# Patient Record
Sex: FEMALE | Race: ASIAN | Employment: OTHER | URBAN - METROPOLITAN AREA
[De-identification: names, ages, dates, MRNs, and addresses within clinical notes are randomized per-mention and may not be internally consistent; named-entity substitution may affect disease eponyms.]

---

## 2017-02-09 ENCOUNTER — OFFICE VISIT (OUTPATIENT)
Dept: INTERNAL MEDICINE CLINIC | Age: 72
End: 2017-02-09

## 2017-02-09 VITALS
DIASTOLIC BLOOD PRESSURE: 69 MMHG | WEIGHT: 132.4 LBS | SYSTOLIC BLOOD PRESSURE: 141 MMHG | BODY MASS INDEX: 26 KG/M2 | HEART RATE: 80 BPM | HEIGHT: 60 IN | TEMPERATURE: 97.3 F | RESPIRATION RATE: 16 BRPM

## 2017-02-09 DIAGNOSIS — M85.80 OSTEOPENIA DETERMINED BY X-RAY: ICD-10-CM

## 2017-02-09 DIAGNOSIS — H26.9 CATARACT: ICD-10-CM

## 2017-02-09 DIAGNOSIS — R26.89 BALANCE PROBLEM: Primary | ICD-10-CM

## 2017-02-09 DIAGNOSIS — R41.3 MEMORY CHANGE: ICD-10-CM

## 2017-02-09 RX ORDER — LANOLIN ALCOHOL/MO/W.PET/CERES
400 CREAM (GRAM) TOPICAL DAILY
COMMUNITY
End: 2018-02-22

## 2017-02-09 NOTE — PATIENT INSTRUCTIONS
Learning About Living Susan  What is a living will? A living will is a legal form you use to write down the kind of care you want at the end of your life. It is used by the health professionals who will treat you if you aren't able to decide for yourself. If you put your wishes in writing, your loved ones and others will know what kind of care you want. They won't need to guess. This can ease your mind and be helpful to others. A living will is not the same as an estate or property will. An estate will explains what you want to happen with your money and property after you die. Is a living will a legal document? A living will is a legal document. Each state has its own laws about living ramon. If you move to another state, make sure that your living will is legal in the state where you now live. Or you might use a universal form that has been approved by many states. This kind of form can sometimes be completed and stored online. Your electronic copy will then be available wherever you have a connection to the Internet. In most cases, doctors will respect your wishes even if you have a form from a different state. · You don't need an  to complete a living will. But legal advice can be helpful if your state's laws are unclear, your health history is complicated, or your family can't agree on what should be in your living will. · You can change your living will at any time. Some people find that their wishes about end-of-life care change as their health changes. · In addition to making a living will, think about completing a medical power of  form. This form lets you name the person you want to make end-of-life treatment decisions for you (your \"health care agent\") if you're not able to. Many hospitals and nursing homes will give you the forms you need to complete a living will and a medical power of .   · Your living will is used only if you can't make or communicate decisions for yourself anymore. If you become able to make decisions again, you can accept or refuse any treatment, no matter what you wrote in your living will. · Your state may offer an online registry. This is a place where you can store your living will online so the doctors and nurses who need to treat you can find it right away. What should you think about when creating a living will? Talk about your end-of-life wishes with your family members and your doctor. Let them know what you want. That way the people making decisions for you won't be surprised by your choices. Think about these questions as you make your living will:  · Do you know enough about life support methods that might be used? If not, talk to your doctor so you know what might be done if you can't breathe on your own, your heart stops, or you're unable to swallow. · What things would you still want to be able to do after you receive life-support methods? Would you want to be able to walk? To speak? To eat on your own? To live without the help of machines? · If you have a choice, where do you want to be cared for? In your home? At a hospital or nursing home? · Do you want certain Hinduism practices performed if you become very ill? · If you have a choice at the end of your life, where would you prefer to die? At home? In a hospital or nursing home? Somewhere else? · Would you prefer to be buried or cremated? · Do you want your organs to be donated after you die? What should you do with your living will? · Make sure that your family members and your health care agent have copies of your living will. · Give your doctor a copy of your living will to keep in your medical record. If you have more than one doctor, make sure that each one has a copy. · You may want to put a copy of your living will where it can be easily found. Where can you learn more? Go to http://jose juan-juancarlos.info/.   Enter B177 in the search box to learn more about \"Learning About Living Perroy. \"  Current as of: February 24, 2016  Content Version: 11.1  © 3626-7823 [employer]. Care instructions adapted under license by [employer] (which disclaims liability or warranty for this information). If you have questions about a medical condition or this instruction, always ask your healthcare professional. [address] any warranty or liability for your use of this information.

## 2017-02-09 NOTE — MR AVS SNAPSHOT
Visit Information Date & Time Provider Department Dept. Phone Encounter #  
 2/9/2017  2:00 PM Simone Natarajan MD [address] [facility] [place] and Internal Medicine [phone] [id number] Follow-up Instructions Return in about 2 months (around 4/9/2017) for medicare wellness. Upcoming Health Maintenance Date Due DTaP/Tdap/Td series (1 - Tdap) 5/25/1965 FOBT Q 1 YEAR AGE 50-75 5/25/1994 ZOSTER VACCINE AGE 60> 5/25/2004 GLAUCOMA SCREENING Q2Y 5/25/2009 Pneumococcal 65+ Low/Medium Risk (1 of 2 - PCV13) 5/25/2009 MEDICARE YEARLY EXAM 5/25/2009 INFLUENZA AGE 9 TO ADULT 8/1/2016 BREAST CANCER SCRN MAMMOGRAM 3/31/2018 Allergies as of 2/9/2017  Review Complete On: 2/9/2017 By: Simone Natarajan MD  
 No Known Allergies Current Immunizations  Never Reviewed No immunizations on file. Not reviewed this visit You Were Diagnosed With   
  
 Codes Comments Balance problem    -  Primary ICD-10-CM: R26.89 
ICD-9-CM: 781.99 Memory change     ICD-10-CM: R41.3 ICD-9-CM: 780.93 Osteopenia determined by x-ray     ICD-10-CM: M85.80 ICD-9-CM: 733.90 Cataract     ICD-10-CM: H26.9 ICD-9-CM: 366.9 Vitals BP Pulse Temp Resp Height(growth percentile) Weight(growth percentile) 141/69 80 97.3 °F (36.3 °C) (Oral) 16 5' (1.524 m) 132 lb 6.4 oz (60.1 kg) BMI OB Status Smoking Status 25.86 kg/m2 Postmenopausal Never Smoker BMI and BSA Data Body Mass Index Body Surface Area  
 25.86 kg/m 2 1.6 m 2 Preferred Pharmacy Pharmacy Name Phone [id number] [place], [address] [place] [address] [phone] Your Updated Medication List  
  
   
This list is accurate as of: 2/9/17  3:23 PM.  Always use your most recent med list.  
  
  
  
  
 calcium 500 mg Tab Take  by mouth. fish oil-omega-3 fatty acids 340-1,000 mg capsule Take 1 Cap by mouth daily. vitamin E acetate 400 unit Cap capsule Take  by mouth daily. We Performed the Following REFERRAL TO OPHTHALMOLOGY [REF57 Custom] Comments:  
 Reason for request: Diabetic eye Exam  
 REFERRAL TO PHYSICAL THERAPY [MZR36 Custom] Comments:  
 Evaluate and treat, debility, balance problems, falls most recently 1 month ago. Leans forward. Would patient benefit from walker? Follow-up Instructions Return in about 2 months (around 4/9/2017) for medicare wellness. Referral Information Referral ID Referred By Referred To  
  
 [id number] Michi Roblesnet Not Available Visits Status Start Date End Date 1 New Request 2/9/17 2/9/18 If your referral has a status of pending review or denied, additional information will be sent to support the outcome of this decision. Referral ID Referred By Referred To  
 [id number] Donnie Espana MD  
   [facility], [facility] Phone: [phone] Fax: [phone] Visits Status Start Date End Date 1 New Request 2/9/17 2/9/18 If your referral has a status of pending review or denied, additional information will be sent to support the outcome of this decision. Patient Instructions Seth Stevie Hauser 1721 What is a living will? A living will is a legal form you use to write down the kind of care you want at the end of your life. It is used by the health professionals who will treat you if you aren't able to decide for yourself. If you put your wishes in writing, your loved ones and others will know what kind of care you want. They won't need to guess. This can ease your mind and be helpful to others. A living will is not the same as an estate or property will. An estate will explains what you want to happen with your money and property after you die. Is a living will a legal document? A living will is a legal document.  Each state has its own laws about living ramon. If you move to another state, make sure that your living will is legal in the state where you now live. Or you might use a universal form that has been approved by many states. This kind of form can sometimes be completed and stored online. Your electronic copy will then be available wherever you have a connection to the Internet. In most cases, doctors will respect your wishes even if you have a form from a different state. · You don't need an  to complete a living will. But legal advice can be helpful if your state's laws are unclear, your health history is complicated, or your family can't agree on what should be in your living will. · You can change your living will at any time. Some people find that their wishes about end-of-life care change as their health changes. · In addition to making a living will, think about completing a medical power of  form. This form lets you name the person you want to make end-of-life treatment decisions for you (your \"health care agent\") if you're not able to. Many hospitals and nursing homes will give you the forms you need to complete a living will and a medical power of . · Your living will is used only if you can't make or communicate decisions for yourself anymore. If you become able to make decisions again, you can accept or refuse any treatment, no matter what you wrote in your living will. · Your state may offer an online registry. This is a place where you can store your living will online so the doctors and nurses who need to treat you can find it right away. What should you think about when creating a living will? Talk about your end-of-life wishes with your family members and your doctor. Let them know what you want. That way the people making decisions for you won't be surprised by your choices. Think about these questions as you make your living will: · Do you know enough about life support methods that might be used? If not, talk to your doctor so you know what might be done if you can't breathe on your own, your heart stops, or you're unable to swallow. · What things would you still want to be able to do after you receive life-support methods? Would you want to be able to walk? To speak? To eat on your own? To live without the help of machines? · If you have a choice, where do you want to be cared for? In your home? At a hospital or nursing home? · Do you want certain Scientologist practices performed if you become very ill? · If you have a choice at the end of your life, where would you prefer to die? At home? In a hospital or nursing home? Somewhere else? · Would you prefer to be buried or cremated? · Do you want your organs to be donated after you die? What should you do with your living will? · Make sure that your family members and your health care agent have copies of your living will. · Give your doctor a copy of your living will to keep in your medical record. If you have more than one doctor, make sure that each one has a copy. · You may want to put a copy of your living will where it can be easily found. Where can you learn more? Go to http://jose juan-juancarlos.info/. Enter M929 in the search box to learn more about \"Learning About Living Kalyn Tariq. \" Current as of: February 24, 2016 Content Version: 11.1 © 3841-9071 [employer]. Care instructions adapted under license by [employer] (which disclaims liability or warranty for this information). If you have questions about a medical condition or this instruction, always ask your healthcare professional. [address] any warranty or liability for your use of this information. Please provide this summary of care documentation to your next provider. Your primary care clinician is listed as Анна Ronquillo.  If you have any questions after today's visit, please call [phone].

## 2017-02-09 NOTE — PROGRESS NOTES
RM 3  Pt presents today with her daughter  Daughter is translating  Chief Complaint   Patient presents with   [address]       1. Have you been to the ER, urgent care clinic since your last visit? Hospitalized since your last visit? No    2. Have you seen or consulted any other health care providers outside of the [address] [place] [address] since your last visit? Include any pap smears or colon screening.  No    Health Maintenance Due   Topic Date Due    DTaP/Tdap/Td series (1 - Tdap) 05/25/1965    FOBT Q 1 YEAR AGE 50-75  05/25/1994    ZOSTER VACCINE AGE 60>  05/25/2004    GLAUCOMA SCREENING Q2Y  05/25/2009    Pneumococcal 65+ Low/Medium Risk (1 of 2 - PCV13) 05/25/2009    MEDICARE YEARLY EXAM  05/25/2009    INFLUENZA AGE 9 TO ADULT  08/01/2016     Living will sent to pt AVs

## 2017-04-04 ENCOUNTER — TELEPHONE (OUTPATIENT)
Dept: INTERNAL MEDICINE CLINIC | Age: 72
End: 2017-04-04

## 2017-04-04 ENCOUNTER — OFFICE VISIT (OUTPATIENT)
Dept: INTERNAL MEDICINE CLINIC | Age: 72
End: 2017-04-04

## 2017-04-04 VITALS
TEMPERATURE: 98.2 F | SYSTOLIC BLOOD PRESSURE: 167 MMHG | HEIGHT: 60 IN | DIASTOLIC BLOOD PRESSURE: 88 MMHG | OXYGEN SATURATION: 99 % | BODY MASS INDEX: 23.68 KG/M2 | HEART RATE: 93 BPM | WEIGHT: 120.6 LBS | RESPIRATION RATE: 16 BRPM

## 2017-04-04 DIAGNOSIS — R03.0 ELEVATED BLOOD PRESSURE READING: ICD-10-CM

## 2017-04-04 DIAGNOSIS — R41.0 DELIRIUM: ICD-10-CM

## 2017-04-04 DIAGNOSIS — R25.9 MIXED ACTION AND RESTING TREMOR: ICD-10-CM

## 2017-04-04 DIAGNOSIS — R41.82 ACUTE ON CHRONIC ALTERATION IN MENTAL STATUS: Primary | ICD-10-CM

## 2017-04-04 DIAGNOSIS — Z87.440 HISTORY OF BLADDER INFECTIONS: ICD-10-CM

## 2017-04-04 DIAGNOSIS — R26.89 SHUFFLING GAIT: ICD-10-CM

## 2017-04-04 DIAGNOSIS — N30.01 ACUTE CYSTITIS WITH HEMATURIA: Primary | ICD-10-CM

## 2017-04-04 DIAGNOSIS — R41.3 MEMORY CHANGE: ICD-10-CM

## 2017-04-04 LAB
BILIRUB UR QL STRIP: NEGATIVE
GLUCOSE UR-MCNC: NEGATIVE MG/DL
KETONES P FAST UR STRIP-MCNC: NEGATIVE MG/DL
PH UR STRIP: 6 [PH] (ref 4.6–8)
PROT UR QL STRIP: NEGATIVE MG/DL
SP GR UR STRIP: 1.02 (ref 1–1.03)
UA UROBILINOGEN AMB POC: NORMAL (ref 0.2–1)
URINALYSIS CLARITY POC: CLEAR
URINALYSIS COLOR POC: YELLOW
URINE BLOOD POC: NORMAL
URINE LEUKOCYTES POC: NORMAL
URINE NITRITES POC: NEGATIVE

## 2017-04-04 RX ORDER — SULFAMETHOXAZOLE AND TRIMETHOPRIM 800; 160 MG/1; MG/1
1 TABLET ORAL 2 TIMES DAILY
Qty: 10 TAB | Refills: 0 | Status: SHIPPED | OUTPATIENT
Start: 2017-04-04 | End: 2017-04-09

## 2017-04-04 RX ORDER — HALOPERIDOL 0.5 MG/1
0.5 TABLET ORAL
Qty: 15 TAB | Refills: 0 | Status: SHIPPED | OUTPATIENT
Start: 2017-04-04 | End: 2017-04-10

## 2017-04-04 NOTE — PROGRESS NOTES
Patient is both [place] speaking. Declined at last visit to have  and today. Daughter serves as . She is also accompanied by her . HPI   Monse Fish He is a 66 y.o. female, she presents today for:    Daughter notes that she is acting very differently. Is playing on the phone all night long. Not getting sleep. Has been buying high cost items, iphone, tvs, gift certificates. States that someone is telling her to do this. That she is being watched. Packed up her suitcase and went outside. Was brought back to home with police. Will wait for clock to turn to 8 or 3 and continue to wait until the \"right time\"     This behavior is all new over past 2-3 weeks and per family is getting worse. Has not complained of headache, but when asked today states she does have. No known fall. No vomiting, normal appetite, no fever. History of UTIs, generally will lose continence. Is not experiencing this currently (neither daughter nor  noting a change). Goes to the bathroom for a very long time but no known diarrhea. More forgetful than prior. No falls, normal balance, no change in gait. No alcohol. No supplemental medications. Only is taking clacium    PMH/PSH: reviewed and updated  Sochx:  reports that she has never smoked. She does not have any smokeless tobacco history on file. She reports that she does not drink alcohol or use illicit drugs. Famhx: reviewed and updated     All: No Known Allergies  Med:   Current Outpatient Prescriptions   Medication Sig    calcium 500 mg Tab Take  by mouth.  fish oil-omega-3 fatty acids 340-1,000 mg capsule Take 1 Cap by mouth daily.  VITAMIN E, DL,TOCOPHERYL ACET, (VITAMIN E ACETATE) 400 unit cap capsule Take  by mouth daily. No current facility-administered medications for this visit. Review of Systems   Constitutional: Negative for chills, fever and weight loss. HENT: Negative for congestion. Respiratory: Negative for cough, shortness of breath and wheezing. Cardiovascular: Negative for chest pain and leg swelling. Gastrointestinal: Negative for abdominal pain, diarrhea, nausea and vomiting. Genitourinary: Negative for dysuria, frequency and urgency. Skin: Negative for rash. Neurological: Positive for headaches. Negative for dizziness. Psychiatric/Behavioral: Positive for hallucinations. The patient has insomnia. (ROS as supplied by patient with assistance of  and daughter)  PE: Blood pressure 167/88, pulse 93, temperature 98.2 °F (36.8 °C), temperature source Oral, resp. rate 16, height 5' (1.524 m), weight 120 lb 9.6 oz (54.7 kg), SpO2 99 %. Body mass index is 23.55 kg/(m^2). Physical Exam   Constitutional: No distress. HENT:   Head: Normocephalic. Mouth/Throat: Oropharynx is clear and moist.   Eyes: Conjunctivae and EOM are normal.   Neck: Neck supple. Cardiovascular: Normal rate, regular rhythm and normal heart sounds. Pulmonary/Chest: Effort normal and breath sounds normal.   Neurological: She is alert. No cranial nerve deficit. Pleasant and cooperative. Does not answer all questions, looks confused. Unable to fully ascertain orientation with questions as she doesn't answer. + resting and action tremor. Gait somewhat shuffling, turns en bloc. This is stable from prior per daughter. Skin: Skin is warm and dry. Nursing note and vitals reviewed. Labs: see addendum  EKG: NSR, QTc 414, normsl ST segments. A/P:  66 y.o. female    ICD-10-CM ICD-9-CM    1. Delirium R41.0 780.09 AMB POC URINALYSIS DIP STICK AUTO W/O MICRO      AMB POC EKG ROUTINE W/ 12 LEADS, INTER & REP      CBC WITH AUTOMATED DIFF      METABOLIC PANEL, COMPREHENSIVE      TSH RFX ON ABNORMAL TO FREE T4      VITAMIN B12      VITAMIN D, 25 HYDROXY      CULTURE, URINE      haloperidol (HALDOL) 0.5 mg tablet   2.  Shuffling gait R26.89 781.2 CT HEAD WO CONT      REFERRAL TO NEUROLOGY REFERRAL TO NEUROLOGY   3. Mixed action and resting tremor R25.9 781.0    4. Memory change R41.3 780.93 CT HEAD WO CONT      TSH RFX ON ABNORMAL TO FREE T4      VITAMIN B12      VITAMIN D, 25 HYDROXY      REFERRAL TO NEUROLOGY      REFERRAL TO NEUROLOGY   5. History of bladder infections Z87.440 V13.02      Delirium vs psychosis with history of memory change. Limited information from prior work-up. Did have MRI brain around 2 years ago with prior primary care. - change in behavior and sleep for 2-3 weeks. No clear cause on exam, nor history. No medications/supplements except baseline calcium. Urine dip wnl and not having typical symptoms of UTI. EKG wnl, bella vitals and no other sign of cause for delirium. Neuro motor exam at baseline. .    - urine culture sent   - will get cbc and metabolic stat   - head CT stat   - no sign of acute infection on vitals. And patient is not in any discomfort. - if labs and head Ct wnl, plan to give haldol prn. As these should result tomorrow, discussed waiting 1 more day before starting medications. - advised to go to ER if new fever, falling, not waking normaly, or other notable change. - follow-up with neurology, will order MRI once initial work-up completed. Elevated blood pressure: repeat at follow-up next week after work-up above complete. Follow-up Disposition:  Return in about 1 week (around 4/11/2017) for 30 minute, follow-up change in behavior,. Future Appointments  Date Time Provider Kyler Dsouza   4/5/2017 3:30 PM [facility] PSYCHIATRIC [place] CT ER 1 HRCT . [facility]   4/10/2017 3:00 PM MD Aneudy Watson 18 and scheduled Head CT stat. Please send note to:  Fax: [phone] April.

## 2017-04-04 NOTE — TELEPHONE ENCOUNTER
Called and discussed urinalysis with daughter. With 1+ blood and 1+ leuks, will treat as for uti. rx for bactrim x5 days sent to [facility]. (24 hour)   Will get first dose tonight.     Zenaida Gomez MD

## 2017-04-04 NOTE — PROGRESS NOTES
RM 2    Chief Complaint   Patient presents with    Dementia     PHQ 2 / 9, over the last two weeks 4/4/2017   Little interest or pleasure in doing things Not at all   Feeling down, depressed or hopeless Nearly every day   Total Score PHQ 2 3     Fall Risk Assessment, last 12 mths 4/4/2017   Able to walk? Yes   Fall in past 12 months? Yes   Fall with injury?  No   Number of falls in past 12 months 4   Fall Risk Score 4

## 2017-04-04 NOTE — MR AVS SNAPSHOT
Visit Information Date & Time Provider Department Dept. Phone Encounter #  
 4/4/2017  4:15 PM Teresita Vega MD [facility]  Follow-up Instructions Return in about 1 week (around 4/11/2017) for 30 minute, follow-up change in behavior,. Your Appointments 4/10/2017  3:00 PM  
Medicare Physical with Teresita Vega MD  
[facility]) Appt Note: medicare wellness [address] [place] [address]  
[address] [place] [address] Upcoming Health Maintenance Date Due DTaP/Tdap/Td series (1 - Tdap) 5/25/1965 FOBT Q 1 YEAR AGE 50-75 5/25/1994 ZOSTER VACCINE AGE 60> 5/25/2004 GLAUCOMA SCREENING Q2Y 5/25/2009 Pneumococcal 65+ Low/Medium Risk (1 of 2 - PCV13) 5/25/2009 MEDICARE YEARLY EXAM 5/25/2009 INFLUENZA AGE 9 TO ADULT 8/1/2016 BREAST CANCER SCRN MAMMOGRAM 3/31/2018 Allergies as of 4/4/2017  Review Complete On: 4/4/2017 By: Jacqueline Booker No Known Allergies Current Immunizations  Never Reviewed No immunizations on file. Not reviewed this visit You Were Diagnosed With   
  
 Codes Comments Delirium    -  Primary ICD-10-CM: R41.0 ICD-9-CM: 780.09 Shuffling gait     ICD-10-CM: R26.89 
ICD-9-CM: 781.2 Intention tremor     ICD-10-CM: G25.2 ICD-9-CM: 333.1 Memory change     ICD-10-CM: R41.3 ICD-9-CM: 780.93 Vitals BP Pulse Temp Resp Height(growth percentile) Weight(growth percentile) 167/88 93 98.2 °F (36.8 °C) (Oral) 16 5' (1.524 m) 120 lb 9.6 oz (54.7 kg) SpO2 BMI OB Status Smoking Status 99% 23.55 kg/m2 Postmenopausal Never Smoker Vitals History BMI and BSA Data Body Mass Index Body Surface Area  
 23.55 kg/m 2 1.52 m 2 Preferred Pharmacy Pharmacy Name Phone [id number], [address] [phone] Your Updated Medication List  
  
   
This list is accurate as of: 4/4/17  5:34 PM.  Always use your most recent med list.  
  
  
  
  
 calcium 500 mg Tab Take  by mouth. fish oil-omega-3 fatty acids 340-1,000 mg capsule Take 1 Cap by mouth daily. haloperidol 0.5 mg tablet Commonly known as:  HALDOL Take 1 Tab by mouth nightly. As needed for insomnia/aggitation  
  
 vitamin E acetate 400 unit Cap capsule Take  by mouth daily. Prescriptions Printed Refills  
 haloperidol (HALDOL) 0.5 mg tablet 0 Sig: Take 1 Tab by mouth nightly. As needed for insomnia/aggitation Class: Print Route: Oral  
  
We Performed the Following AMB POC EKG ROUTINE W/ 12 LEADS, INTER & REP [25126 CPT(R)] AMB POC URINALYSIS DIP STICK AUTO W/O MICRO [83762 CPT(R)] CBC WITH AUTOMATED DIFF [54399 CPT(R)] CULTURE, URINE [id number] CPT(R)] METABOLIC PANEL, COMPREHENSIVE [88243 CPT(R)] REFERRAL TO NEUROLOGY [CYI61 Custom] Comments:  
 Please evaluate patient for dementia with shuffling gait,  parkinsons TSH RFX ON ABNORMAL TO FREE T4 [[id number] Custom] VITAMIN B12 [id number] CPT(R)] VITAMIN D, 25 HYDROXY [id number] CPT(R)] Follow-up Instructions Return in about 1 week (around 4/11/2017) for 30 minute, follow-up change in behavior,. To-Do List   
 04/05/2017 Imaging:  CT HEAD WO CONT   
  
 04/05/2017 3:30 PM  
  Appointment with [address] [place] [address] CT ER 1 at [address] [place] [address] RAD [address] ([phone]) NON-CONTRAST STUDY: 1. Bring any non [facility] facility films/images pertaining to the area of interest with you on the day of appointment. 2. Check in at registration at least 30 minutes before appt time unless you were instructed to do otherwise.  3. If you have to drink oral contrast please pick it up any weekday prior to your appointment, if you cannot please check in 2 hrs  before appt time. Referral Information Referral ID Referred By Referred To  
  
 [id number] Dave Orts Not Available Visits Status Start Date End Date 1 New Request 4/4/17 4/4/18 If your referral has a status of pending review or denied, additional information will be sent to support the outcome of this decision. Referral ID Referred By Referred To  
 [id number] Dave Orts Not Available Visits Status Start Date End Date 1 New Request 4/4/17 4/4/18 If your referral has a status of pending review or denied, additional information will be sent to support the outcome of this decision. Patient Instructions Learning About Delirium What is delirium? Delirium is a sudden change in mental condition. It leads to confusion and unusual behavior. Delirium is also called acute confusional state. Delirium affects all age groups. It can result from problems that affect the brain, such as stroke. It can also happen after an infection or when using certain medicines. Pain may also cause the problem. Seeing delirium in a loved one can be scary and sad. But it will go away most of the time. It usually lasts hours to days. The doctor will look for a cause and take steps to treat it and keep your loved one comfortable. What are the symptoms? Symptoms of delirium usually develop over several hours to a few days. Symptoms may change and be more or less severe. Symptoms include: · A short attention span. · Confusion. This is not knowing where you are, what time it is, or who others are. · Hallucinations. This usually is seeing or hearing things that are not really there. · Delusions. This is believing things that aren't true. · Illusions. This is making a mistake in what you think is real. For example, you think a child is crying, but it's a pillow. · Disorganized thinking. How is delirium treated? The doctor may: · Find and treat the cause. This could be: ¨ Not getting enough fluids. ¨ An infection. ¨ A medicine or combination of medicines. ¨ Another medical problem. · Prescribe a medicine. · Make the hospital room as quiet as possible. You may be able to help your loved one by being present and talking to and touching him or her. Follow-up care is a key part of your treatment and safety. Be sure to make and go to all appointments, and call your doctor if you are having problems. It's also a good idea to know your test results and keep a list of the medicines you take. Where can you learn more? Go to http://jose juan-juancarlos.info/. Enter S633 in the search box to learn more about \"Learning About Delirium. \" Current as of: July 26, 2016 Content Version: 11.2 © 6813-4131 [employer]. Care instructions adapted under license by [employer] (which disclaims liability or warranty for this information). If you have questions about a medical condition or this instruction, always ask your healthcare professional. [place] [address] any warranty or liability for your use of this information. Please provide this summary of care documentation to your next provider. Your primary care clinician is listed as Cody Turcios. If you have any questions after today's visit, please call [phone].

## 2017-04-04 NOTE — PATIENT INSTRUCTIONS
Learning About Delirium  What is delirium? Delirium is a sudden change in mental condition. It leads to confusion and unusual behavior. Delirium is also called acute confusional state. Delirium affects all age groups. It can result from problems that affect the brain, such as stroke. It can also happen after an infection or when using certain medicines. Pain may also cause the problem. Seeing delirium in a loved one can be scary and sad. But it will go away most of the time. It usually lasts hours to days. The doctor will look for a cause and take steps to treat it and keep your loved one comfortable. What are the symptoms? Symptoms of delirium usually develop over several hours to a few days. Symptoms may change and be more or less severe. Symptoms include:  · A short attention span. · Confusion. This is not knowing where you are, what time it is, or who others are. · Hallucinations. This usually is seeing or hearing things that are not really there. · Delusions. This is believing things that aren't true. · Illusions. This is making a mistake in what you think is real. For example, you think a child is crying, but it's a pillow. · Disorganized thinking. How is delirium treated? The doctor may:  · Find and treat the cause. This could be:  ¨ Not getting enough fluids. ¨ An infection. ¨ A medicine or combination of medicines. ¨ Another medical problem. · Prescribe a medicine. · Make the hospital room as quiet as possible. You may be able to help your loved one by being present and talking to and touching him or her. Follow-up care is a key part of your treatment and safety. Be sure to make and go to all appointments, and call your doctor if you are having problems. It's also a good idea to know your test results and keep a list of the medicines you take. Where can you learn more? Go to http://jose juan-juancarlos.info/.   Enter Q325 in the search box to learn more about \"Learning About Delirium. \"  Current as of: July 26, 2016  Content Version: 11.2  © 5662-7017 [employer], [facility]. Care instructions adapted under license by [employer] (which disclaims liability or warranty for this information). If you have questions about a medical condition or this instruction, always ask your healthcare professional. [place] [address] any warranty or liability for your use of this information.

## 2017-04-05 ENCOUNTER — TELEPHONE (OUTPATIENT)
Dept: INTERNAL MEDICINE CLINIC | Age: 72
End: 2017-04-05

## 2017-04-05 ENCOUNTER — HOSPITAL ENCOUNTER (OUTPATIENT)
Dept: CT IMAGING | Age: 72
Discharge: HOME | End: 2017-04-05
Attending: INTERNAL MEDICINE
Payer: MEDICARE

## 2017-04-05 DIAGNOSIS — R41.3 MEMORY CHANGE: ICD-10-CM

## 2017-04-05 DIAGNOSIS — R26.89 SHUFFLING GAIT: ICD-10-CM

## 2017-04-05 LAB — BACTERIA UR CULT: NO GROWTH

## 2017-04-05 PROCEDURE — 70450 CT HEAD/BRAIN W/O DYE: CPT

## 2017-04-05 NOTE — PROGRESS NOTES
Called and spoke with daughter. Noting normal metabolic, CBC and head Ct. No immidiately apparent reason for behavior change. Will trial haldol tonight and follow-up further labs tomorrow. Plan for neurology input regarding likely underlying dementia. See phone note for follow-up appointment plan.

## 2017-04-05 NOTE — TELEPHONE ENCOUNTER
Please call and schedule follow-up appointment with me in ~ 1 week to discuss mental status and follow-up haldol start. (30 mintues)      Please also call and arrange folllow-up with neurology, next available, hopefully within 2-3 weeks for furhter assessment of underlying dementia with subacute behavioral change.  ([facility] neurology: [phone], Laurent Quijano or other provider)    Thanks  Melissa Isbell MD

## 2017-04-06 LAB
25(OH)D3+25(OH)D2 SERPL-MCNC: 30.4 NG/ML (ref 30–100)
ALBUMIN SERPL-MCNC: 4.5 G/DL (ref 3.5–4.8)
ALBUMIN/GLOB SERPL: 1.4 {RATIO} (ref 1.2–2.2)
ALP SERPL-CCNC: 75 IU/L (ref 39–117)
ALT SERPL-CCNC: 17 IU/L (ref 0–32)
AST SERPL-CCNC: 23 IU/L (ref 0–40)
BASOPHILS # BLD AUTO: 0 X10E3/UL (ref 0–0.2)
BASOPHILS NFR BLD AUTO: 0 %
BILIRUB SERPL-MCNC: 1.1 MG/DL (ref 0–1.2)
BUN SERPL-MCNC: 16 MG/DL (ref 8–27)
BUN/CREAT SERPL: 15 (ref 12–28)
CALCIUM SERPL-MCNC: 9.8 MG/DL (ref 8.7–10.3)
CHLORIDE SERPL-SCNC: 101 MMOL/L (ref 96–106)
CO2 SERPL-SCNC: 21 MMOL/L (ref 18–29)
CREAT SERPL-MCNC: 1.06 MG/DL (ref 0.57–1)
EOSINOPHIL # BLD AUTO: 0 X10E3/UL (ref 0–0.4)
EOSINOPHIL NFR BLD AUTO: 0 %
ERYTHROCYTE [DISTWIDTH] IN BLOOD BY AUTOMATED COUNT: 12.8 % (ref 12.3–15.4)
GLOBULIN SER CALC-MCNC: 3.2 G/DL (ref 1.5–4.5)
GLUCOSE SERPL-MCNC: 116 MG/DL (ref 65–99)
HCT VFR BLD AUTO: 40.5 % (ref 34–46.6)
HGB BLD-MCNC: 14.1 G/DL (ref 11.1–15.9)
LYMPHOCYTES # BLD AUTO: 1.3 X10E3/UL (ref 0.7–3.1)
LYMPHOCYTES NFR BLD AUTO: 17 %
MCH RBC QN AUTO: 31.1 PG (ref 26.6–33)
MCHC RBC AUTO-ENTMCNC: 34.8 G/DL (ref 31.5–35.7)
MCV RBC AUTO: 89 FL (ref 79–97)
MONOCYTES # BLD AUTO: 0.4 X10E3/UL (ref 0.1–0.9)
MONOCYTES NFR BLD AUTO: 5 %
NEUTROPHILS # BLD AUTO: 6 X10E3/UL (ref 1.4–7)
NEUTROPHILS NFR BLD AUTO: 78 %
PLATELET # BLD AUTO: 276 X10E3/UL (ref 150–379)
POTASSIUM SERPL-SCNC: 4.3 MMOL/L (ref 3.5–5.2)
PROT SERPL-MCNC: 7.7 G/DL (ref 6–8.5)
RBC # BLD AUTO: 4.54 X10E6/UL (ref 3.77–5.28)
SODIUM SERPL-SCNC: 141 MMOL/L (ref 134–144)
TSH SERPL DL<=0.005 MIU/L-ACNC: 1.13 UIU/ML (ref 0.45–4.5)
VIT B12 SERPL-MCNC: 682 PG/ML (ref 211–946)
WBC # BLD AUTO: 7.6 X10E3/UL (ref 3.4–10.8)

## 2017-04-10 ENCOUNTER — OFFICE VISIT (OUTPATIENT)
Dept: INTERNAL MEDICINE CLINIC | Age: 72
End: 2017-04-10

## 2017-04-10 VITALS
HEIGHT: 60 IN | SYSTOLIC BLOOD PRESSURE: 161 MMHG | TEMPERATURE: 97.4 F | DIASTOLIC BLOOD PRESSURE: 79 MMHG | WEIGHT: 125.2 LBS | BODY MASS INDEX: 24.58 KG/M2 | RESPIRATION RATE: 16 BRPM | HEART RATE: 95 BPM | OXYGEN SATURATION: 98 %

## 2017-04-10 DIAGNOSIS — Z23 ENCOUNTER FOR IMMUNIZATION: ICD-10-CM

## 2017-04-10 DIAGNOSIS — F03.91 DEMENTIA WITH BEHAVIORAL DISTURBANCE, UNSPECIFIED DEMENTIA TYPE: ICD-10-CM

## 2017-04-10 DIAGNOSIS — M85.80 OSTEOPENIA, UNSPECIFIED LOCATION: ICD-10-CM

## 2017-04-10 DIAGNOSIS — Z13.39 SCREENING FOR ALCOHOLISM: ICD-10-CM

## 2017-04-10 DIAGNOSIS — Z00.00 ROUTINE GENERAL MEDICAL EXAMINATION AT A HEALTH CARE FACILITY: Primary | ICD-10-CM

## 2017-04-10 PROBLEM — N30.01 ACUTE CYSTITIS WITH HEMATURIA: Status: RESOLVED | Noted: 2017-04-04 | Resolved: 2017-04-10

## 2017-04-10 LAB
BILIRUB UR QL STRIP: NEGATIVE
GLUCOSE UR-MCNC: NEGATIVE MG/DL
KETONES P FAST UR STRIP-MCNC: NEGATIVE MG/DL
PH UR STRIP: 5.5 [PH] (ref 4.6–8)
PROT UR QL STRIP: NEGATIVE MG/DL
SP GR UR STRIP: 1 (ref 1–1.03)
UA UROBILINOGEN AMB POC: NORMAL (ref 0.2–1)
URINALYSIS CLARITY POC: CLEAR
URINALYSIS COLOR POC: YELLOW
URINE BLOOD POC: NORMAL
URINE LEUKOCYTES POC: NEGATIVE
URINE NITRITES POC: NEGATIVE

## 2017-04-10 RX ORDER — OLANZAPINE 2.5 MG/1
2.5 TABLET ORAL
Qty: 30 TAB | Refills: 1 | Status: SHIPPED | OUTPATIENT
Start: 2017-04-10 | End: 2017-04-24

## 2017-04-10 NOTE — MR AVS SNAPSHOT
Visit Information Date & Time Provider Department Dept. Phone Encounter #  
 4/10/2017  3:00 PM Justine Crowder MD [facility] [phone] Follow-up Instructions Return in about 1 month (around 5/10/2017) for pleas call in 1 week with update on repsonse to medication. Return in 1 month for dementia follow-up. Upcoming Health Maintenance Date Due DTaP/Tdap/Td series (1 - Tdap) 5/25/1965 FOBT Q 1 YEAR AGE 50-75 5/25/1994 ZOSTER VACCINE AGE 60> 5/25/2004 GLAUCOMA SCREENING Q2Y 5/25/2009 Pneumococcal 65+ Low/Medium Risk (1 of 2 - PCV13) 5/25/2009 MEDICARE YEARLY EXAM 5/25/2009 INFLUENZA AGE 9 TO ADULT 8/1/2016 BREAST CANCER SCRN MAMMOGRAM 3/31/2018 Allergies as of 4/10/2017  Review Complete On: 4/10/2017 By: Gamal Lee LPN No Known Allergies Current Immunizations  Never Reviewed No immunizations on file. Not reviewed this visit You Were Diagnosed With   
  
 Codes Comments Routine general medical examination at a health care facility    -  Primary ICD-10-CM: Z00.00 ICD-9-CM: V70.0 Screening for alcoholism     ICD-10-CM: Z13.89 ICD-9-CM: V79.1 Encounter for immunization     ICD-10-CM: W27 ICD-9-CM: V03.89 Dementia with behavioral disturbance, unspecified dementia type     ICD-10-CM: F03.91 
ICD-9-CM: 294.21 Osteopenia, unspecified location     ICD-10-CM: M85.80 ICD-9-CM: 733.90 Vitals BP Pulse Temp Resp Height(growth percentile) Weight(growth percentile) 161/79 95 97.4 °F (36.3 °C) (Oral) 16 5' (1.524 m) 125 lb 3.2 oz (56.8 kg) SpO2 BMI OB Status Smoking Status 98% 24.45 kg/m2 Postmenopausal Never Smoker BMI and BSA Data Body Mass Index Body Surface Area  
 24.45 kg/m 2 1.55 m 2 Preferred Pharmacy Pharmacy Name Phone [id number] Martha Mckeon, [address] [place] [address] [phone] Your Updated Medication List  
  
   
This list is accurate as of: 4/10/17  4:16 PM.  Always use your most recent med list.  
  
  
  
  
 calcium 500 mg Tab Take  by mouth. fish oil-omega-3 fatty acids 340-1,000 mg capsule Take 1 Cap by mouth daily. OLANZapine 2.5 mg tablet Commonly known as:  ZyPREXA Take 1 Tab by mouth nightly. vitamin E acetate 400 unit Cap capsule Take  by mouth daily. Prescriptions Sent to Pharmacy Refills OLANZapine (ZYPREXA) 2.5 mg tablet 1 Sig: Take 1 Tab by mouth nightly. Class: Normal  
 [facility], [address] [place] [address] #: [phone] Route: Oral  
  
We Performed the Following AMB POC URINALYSIS DIP STICK AUTO W/O MICRO [09763 CPT(R)] REFERRAL TO NEUROLOGY [HKM91 Custom] Comments:  
 Please evaluate patient for behavior change, suspect dementia with delusion. MRI ordered, delerium work -up from 4/4 not revealing of new problem. Follow-up Instructions Return in about 1 month (around 5/10/2017) for pleas call in 1 week with update on repsonse to medication. Return in 1 month for dementia follow-up. To-Do List   
 04/11/2017 Imaging:  MRI BRAIN WO CONT Referral Information Referral ID Referred By Referred To  
  
 [id number] Gracia Bright Not Available Visits Status Start Date End Date 1 New Request 4/10/17 4/10/18 If your referral has a status of pending review or denied, additional information will be sent to support the outcome of this decision. Referral ID Referred By Referred To  
 [id number] Brown MURPHY [address], [address] [facility] [address] [facility] [place] Ruthy Amy Engel Phone: [phone] Fax: [phone] Visits Status Start Date End Date 1 New Request 4/10/17 4/10/18 If your referral has a status of pending review or denied, additional information will be sent to support the outcome of this decision. Patient Instructions Stop Haldol Start olanzapine (zyprexa). Call in 1 week with update on response. Please call scheduling department to arrange for MRI testing at: [phone] Seth Hauser 4805 What is a living will? A living will is a legal form you use to write down the kind of care you want at the end of your life. It is used by the health professionals who will treat you if you aren't able to decide for yourself. If you put your wishes in writing, your loved ones and others will know what kind of care you want. They won't need to guess. This can ease your mind and be helpful to others. A living will is not the same as an estate or property will. An estate will explains what you want to happen with your money and property after you die. Is a living will a legal document? A living will is a legal document. Each state has its own laws about living ramon. If you move to another state, make sure that your living will is legal in the state where you now live. Or you might use a universal form that has been approved by many states. This kind of form can sometimes be completed and stored online. Your electronic copy will then be available wherever you have a connection to the Internet. In most cases, doctors will respect your wishes even if you have a form from a different state. · You don't need an  to complete a living will. But legal advice can be helpful if your state's laws are unclear, your health history is complicated, or your family can't agree on what should be in your living will. · You can change your living will at any time. Some people find that their wishes about end-of-life care change as their health changes.  
· In addition to making a living will, think about completing a medical power of  form. This form lets you name the person you want to make end-of-life treatment decisions for you (your \"health care agent\") if you're not able to. Many hospitals and nursing homes will give you the forms you need to complete a living will and a medical power of . · Your living will is used only if you can't make or communicate decisions for yourself anymore. If you become able to make decisions again, you can accept or refuse any treatment, no matter what you wrote in your living will. · Your state may offer an online registry. This is a place where you can store your living will online so the doctors and nurses who need to treat you can find it right away. What should you think about when creating a living will? Talk about your end-of-life wishes with your family members and your doctor. Let them know what you want. That way the people making decisions for you won't be surprised by your choices. Think about these questions as you make your living will: · Do you know enough about life support methods that might be used? If not, talk to your doctor so you know what might be done if you can't breathe on your own, your heart stops, or you're unable to swallow. · What things would you still want to be able to do after you receive life-support methods? Would you want to be able to walk? To speak? To eat on your own? To live without the help of machines? · If you have a choice, where do you want to be cared for? In your home? At a hospital or nursing home? · Do you want certain Congregational practices performed if you become very ill? · If you have a choice at the end of your life, where would you prefer to die? At home? In a hospital or nursing home? Somewhere else? · Would you prefer to be buried or cremated? · Do you want your organs to be donated after you die? What should you do with your living will?  
· Make sure that your family members and your health care agent have copies of your living will. · Give your doctor a copy of your living will to keep in your medical record. If you have more than one doctor, make sure that each one has a copy. · You may want to put a copy of your living will where it can be easily found. Where can you learn more? Go to http://jose juan-juancarlos.info/. Enter D152 in the search box to learn more about \"Learning About Living Sai Plascencia. \" Current as of: February 24, 2016 Content Version: 11.2 © 5470-1946 [employer]. Care instructions adapted under license by [employer] (which disclaims liability or warranty for this information). If you have questions about a medical condition or this instruction, always ask your healthcare professional. [address] any warranty or liability for your use of this information. Medicare Wellness Visit, Female The best way to live healthy is to have a healthy lifestyle by eating a well-balanced diet, exercising regularly, limiting alcohol and stopping smoking. Regular physical exams and screening tests are another way to keep healthy. Preventive exams provided by your health care provider can find health problems before they become diseases or illnesses. Preventive services including immunizations, screening tests, monitoring and exams can help you take care of your own health. All people over age 72 should have a pneumovax  and and a prevnar shot to prevent pneumonia. These are once in a lifetime unless you and your provider decide differently. All people over 65 should have a yearly flu shot and a tetanus vaccine every 10 years. A bone mass density to screen for osteoporosis or thinning of the bones should be done every 2 years after 65. Screening for diabetes mellitus with a blood sugar test should be done every year.  
 
Glaucoma is a disease of the eye due to increased ocular pressure that can lead to blindness and it should be done every year by an eye professional. 
 
Cardiovascular screening tests that check for elevated lipids (fatty part of blood) which can lead to heart disease and strokes should be done every 5 years. Colorectal screening that evaluates for blood or polyps in your colon should be done yearly as a stool test or every five years as a flexible sigmoidoscope or every 10 years as a colonoscopy up to age 76. Breast cancer screening with a mammogram is recommended biennially  for women age 54-69. Screening for cervical cancer with a pap smear and pelvic exam is recommended for women after age 72 years every 2 years up to age 79 or when the provider and patient decide to stop. If there is a history of cervical abnormalities or other increased risk for cancer then the test is recommended yearly. Hepatitis C screening is also recommended for anyone born between 80 through [address]. A shingles vaccine is also recommended once in a lifetime after age 61. Your Medicare Wellness Exam is recommended annually. Here is a list of your current Health Maintenance items with a due date: 
Health Maintenance Due Topic Date Due  
 DTaP/Tdap/Td  (1 - Tdap) 05/25/1965  Stool testing for trace blood  05/25/1994  Shingles Vaccine  05/25/2004  Glaucoma Screening   05/25/2009  Pneumococcal Vaccine (1 of 2 - PCV13) 05/25/2009 Ilene Marin Annual Well Visit  05/25/2009  Flu Vaccine  08/01/2016 Dual-Energy X-Ray Absorptiometry (DXA) Test: About This Test 
What is it? Dual-energy X-ray absorptiometry (DXA) is a test that uses two different X-ray beams to check bone thickness (density) in your spine and hip. This information is used to estimate the strength of your bones. Why is this test done? A DXA test is done to check for bone thinning and weakness, which can make it easier for you to break a bone. It is often done for: · People who are at risk for osteoporosis, including: ¨ Women who are age 72 and older. ¨ Older men. ¨ People who take some medications, such as corticosteroids. ¨ People who have certain medical conditions, such as hyperparathyroidism. · People who have osteoporosis, to see how well treatment is working. What happens before the test? 
· Women who are pregnant should not have this test. Let your doctor know if you are or might be pregnant. · You may be able to leave your clothes on for the test, but you will remove any metal buttons or klever for the test. 
What happens during the test? 
· You will lie down on your back on a padded table. · You may need to lie with your legs straight or with your lower legs resting on a platform built into the table. · The machine will scan your bones and measure the amount of radiation they absorb. During this test you are exposed to a very low dose of radiation. How long does the test take? · The test will take about 20 minutes. What happens after the test? 
· You will probably be able to go home right away. · You can go back to your usual activities right away. Follow-up care is a key part of your treatment and safety. Be sure to make and go to all appointments, and call your doctor if you are having problems. It's also a good idea to keep a list of the medicines you take. Ask your doctor when you can expect to have your test results. Where can you learn more? Go to http://jose juan-juancarlos.info/. Enter C570 in the search box to learn more about \"Dual-Energy X-Ray Absorptiometry (DXA) Test: About This Test.\" Current as of: August 4, 2016 Content Version: 11.2 © 2192-7966 [employer]. Care instructions adapted under license by [employer] (which disclaims liability or warranty for this information).  If you have questions about a medical condition or this instruction, always ask your healthcare professional. Jovita Moran [employer] disclaims any warranty or liability for your use of this information. Please provide this summary of care documentation to your next provider. Your primary care clinician is listed as Markie Harper. If you have any questions after today's visit, please call [phone].
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
## 2017-04-10 NOTE — PROGRESS NOTES
RM 2  Pt presents today with her  and daughter  Daughter will translate for her mother  Per daughter\" pt imagination in her mind is getting worse\"  Per daughter behavior of pt is getting worse    Chief Complaint   Patient presents with    Annual Wellness Visit    Memory Loss     follow up       1. Have you been to the ER, urgent care clinic since your last visit? Hospitalized since your last visit? No    2. Have you seen or consulted any other health care providers outside of the [address] [place] [address] [place] since your last visit? Include any pap smears or colon screening.  No    Health Maintenance Due   Topic Date Due    DTaP/Tdap/Td series (1 - Tdap) 05/25/1965    FOBT Q 1 YEAR AGE 50-75  05/25/1994    ZOSTER VACCINE AGE 60>  05/25/2004    GLAUCOMA SCREENING Q2Y  05/25/2009    Pneumococcal 65+ Low/Medium Risk (1 of 2 - PCV13) 05/25/2009    MEDICARE YEARLY EXAM  05/25/2009    INFLUENZA AGE 9 TO ADULT  08/01/2016     PHQ 2 / 9, over the last two weeks 4/4/2017   Little interest or pleasure in doing things Not at all   Feeling down, depressed or hopeless Nearly every day   Total Score PHQ 2 3     ADL Assessment 4/10/2017   Feeding yourself Help Needed   Getting from bed to chair Help Needed   Getting dressed No Help Needed   Bathing or showering Help Needed   Walk across the room (includes cane/walker) Help Needed   Using the telphone Help Needed   Taking your medications Help Needed   Preparing meals Help Needed   Managing money (expenses/bills) Help Needed   Moderately strenuous housework (laundry) Help Needed   Shopping for personal items (toiletries/medicines) Help Needed   Shopping for groceries Help Needed   Driving Help Needed   Climbing a flight of stairs Help Needed   Getting to places beyond walking distances Help Needed   Living will sent to pt AVS

## 2017-04-10 NOTE — PATIENT INSTRUCTIONS
Stop Haldol  Start olanzapine (zyprexa). Call in 1 week with update on response. Please call scheduling department to arrange for MRI testing at: [facility]  What is a living will? A living will is a legal form you use to write down the kind of care you want at the end of your life. It is used by the health professionals who will treat you if you aren't able to decide for yourself. If you put your wishes in writing, your loved ones and others will know what kind of care you want. They won't need to guess. This can ease your mind and be helpful to others. A living will is not the same as an estate or property will. An estate will explains what you want to happen with your money and property after you die. Is a living will a legal document? A living will is a legal document. Each state has its own laws about living [place]. If you move to another state, make sure that your living will is legal in the state where you now live. Or you might use a universal form that has been approved by many states. This kind of form can sometimes be completed and stored online. Your electronic copy will then be available wherever you have a connection to the Internet. In most cases, doctors will respect your wishes even if you have a form from a different state. · You don't need an  to complete a living will. But legal advice can be helpful if your state's laws are unclear, your health history is complicated, or your family can't agree on what should be in your living will. · You can change your living will at any time. Some people find that their wishes about end-of-life care change as their health changes. · In addition to making a living will, think about completing a medical power of  form. This form lets you name the person you want to make end-of-life treatment decisions for you (your \"health care agent\") if you're not able to.  Many hospitals and nursing homes will give you the forms you need to complete a living will and a medical power of . · Your living will is used only if you can't make or communicate decisions for yourself anymore. If you become able to make decisions again, you can accept or refuse any treatment, no matter what you wrote in your living will. · Your state may offer an online registry. This is a place where you can store your living will online so the doctors and nurses who need to treat you can find it right away. What should you think about when creating a living will? Talk about your end-of-life wishes with your family members and your doctor. Let them know what you want. That way the people making decisions for you won't be surprised by your choices. Think about these questions as you make your living will:  · Do you know enough about life support methods that might be used? If not, talk to your doctor so you know what might be done if you can't breathe on your own, your heart stops, or you're unable to swallow. · What things would you still want to be able to do after you receive life-support methods? Would you want to be able to walk? To speak? To eat on your own? To live without the help of machines? · If you have a choice, where do you want to be cared for? In your home? At a hospital or nursing home? · Do you want certain Hinduism practices performed if you become very ill? · If you have a choice at the end of your life, where would you prefer to die? At home? In a hospital or nursing home? Somewhere else? · Would you prefer to be buried or cremated? · Do you want your organs to be donated after you die? What should you do with your living will? · Make sure that your family members and your health care agent have copies of your living will. · Give your doctor a copy of your living will to keep in your medical record. If you have more than one doctor, make sure that each one has a copy.   · You may want to put a copy of your living will where it can be easily found. Where can you learn more? Go to http://jose juan-juancarlos.info/. Enter R652 in the search box to learn more about \"Learning About Living Pablo Stevens. \"  Current as of: February 24, 2016  Content Version: 11.2  © 1157-4334 [employer]. Care instructions adapted under license by [employer] (which disclaims liability or warranty for this information). If you have questions about a medical condition or this instruction, always ask your healthcare professional. [address] any warranty or liability for your use of this information. Medicare Wellness Visit, Female    The best way to live healthy is to have a healthy lifestyle by eating a well-balanced diet, exercising regularly, limiting alcohol and stopping smoking. Regular physical exams and screening tests are another way to keep healthy. Preventive exams provided by your health care provider can find health problems before they become diseases or illnesses. Preventive services including immunizations, screening tests, monitoring and exams can help you take care of your own health. All people over age 72 should have a pneumovax  and and a prevnar shot to prevent pneumonia. These are once in a lifetime unless you and your provider decide differently. All people over 65 should have a yearly flu shot and a tetanus vaccine every 10 years. A bone mass density to screen for osteoporosis or thinning of the bones should be done every 2 years after 65. Screening for diabetes mellitus with a blood sugar test should be done every year. Glaucoma is a disease of the eye due to increased ocular pressure that can lead to blindness and it should be done every year by an eye professional.    Cardiovascular screening tests that check for elevated lipids (fatty part of blood) which can lead to heart disease and strokes should be done every 5 years.     Colorectal screening that evaluates for blood or polyps in your colon should be done yearly as a stool test or every five years as a flexible sigmoidoscope or every 10 years as a colonoscopy up to age 76. Breast cancer screening with a mammogram is recommended biennially  for women age 54-69. Screening for cervical cancer with a pap smear and pelvic exam is recommended for women after age 72 years every 2 years up to age 79 or when the provider and patient decide to stop. If there is a history of cervical abnormalities or other increased risk for cancer then the test is recommended yearly. Hepatitis C screening is also recommended for anyone born between [birth year] through [address]. A shingles vaccine is also recommended once in a lifetime after age 61. Your Medicare Wellness Exam is recommended annually. Here is a list of your current Health Maintenance items with a due date:  Health Maintenance Due   Topic Date Due    DTaP/Tdap/Td  (1 - Tdap) 05/25/1965    Stool testing for trace blood  05/25/1994    Shingles Vaccine  05/25/2004    Glaucoma Screening   05/25/2009    Pneumococcal Vaccine (1 of 2 - PCV13) 05/25/2009    Annual Well Visit  05/25/2009    Flu Vaccine  08/01/2016            Dual-Energy X-Ray Absorptiometry (DXA) Test: About This Test  What is it? Dual-energy X-ray absorptiometry (DXA) is a test that uses two different X-ray beams to check bone thickness (density) in your spine and hip. This information is used to estimate the strength of your bones. Why is this test done? A DXA test is done to check for bone thinning and weakness, which can make it easier for you to break a bone. It is often done for:  · People who are at risk for osteoporosis, including:  ¨ Women who are age 72 and older. ¨ Older men. ¨ People who take some medications, such as corticosteroids. ¨ People who have certain medical conditions, such as hyperparathyroidism.   · People who have osteoporosis, to see how well treatment is working. What happens before the test?  · Women who are pregnant should not have this test. Let your doctor know if you are or might be pregnant. · You may be able to leave your clothes on for the test, but you will remove any metal buttons or klever for the test.  What happens during the test?  · You will lie down on your back on a padded table. · You may need to lie with your legs straight or with your lower legs resting on a platform built into the table. · The machine will scan your bones and measure the amount of radiation they absorb. During this test you are exposed to a very low dose of radiation. How long does the test take? · The test will take about 20 minutes. What happens after the test?  · You will probably be able to go home right away. · You can go back to your usual activities right away. Follow-up care is a key part of your treatment and safety. Be sure to make and go to all appointments, and call your doctor if you are having problems. It's also a good idea to keep a list of the medicines you take. Ask your doctor when you can expect to have your test results. Where can you learn more? Go to http://jose juan-juancarlos.info/. Enter B110 in the search box to learn more about \"Dual-Energy X-Ray Absorptiometry (DXA) Test: About This Test.\"  Current as of: August 4, 2016  Content Version: 11.2  © 5640-6687 [employer]. Care instructions adapted under license by [employer] (which disclaims liability or warranty for this information). If you have questions about a medical condition or this instruction, always ask your healthcare professional. [address] any warranty or liability for your use of this information.

## 2017-04-11 ENCOUNTER — HOSPITAL ENCOUNTER (INPATIENT)
Age: 72
LOS: 13 days | Discharge: HOME | DRG: 057 | End: 2017-04-24
Attending: EMERGENCY MEDICINE | Admitting: PSYCHIATRY & NEUROLOGY
Payer: MEDICARE

## 2017-04-11 ENCOUNTER — TELEPHONE (OUTPATIENT)
Dept: INTERNAL MEDICINE CLINIC | Age: 72
End: 2017-04-11

## 2017-04-11 ENCOUNTER — APPOINTMENT (OUTPATIENT)
Dept: CT IMAGING | Age: 72
DRG: 057 | End: 2017-04-11
Attending: NURSE PRACTITIONER
Payer: MEDICARE

## 2017-04-11 DIAGNOSIS — F03.91 DEMENTIA WITH BEHAVIORAL DISTURBANCE, UNSPECIFIED DEMENTIA TYPE: ICD-10-CM

## 2017-04-11 DIAGNOSIS — R41.82 ALTERED MENTAL STATUS, UNSPECIFIED ALTERED MENTAL STATUS TYPE: Primary | ICD-10-CM

## 2017-04-11 DIAGNOSIS — R44.3 HALLUCINATIONS: ICD-10-CM

## 2017-04-11 PROBLEM — F03.90 DEMENTIA (HCC): Status: ACTIVE | Noted: 2017-04-11

## 2017-04-11 LAB
ALBUMIN SERPL BCP-MCNC: 3.4 G/DL (ref 3.5–5)
ALBUMIN/GLOB SERPL: 0.9 {RATIO} (ref 1.1–2.2)
ALP SERPL-CCNC: 62 U/L (ref 45–117)
ALT SERPL-CCNC: 23 U/L (ref 12–78)
AMPHET UR QL SCN: NEGATIVE
ANION GAP BLD CALC-SCNC: 8 MMOL/L (ref 5–15)
APAP SERPL-MCNC: <2 UG/ML (ref 10–30)
APPEARANCE UR: CLEAR
AST SERPL W P-5'-P-CCNC: 16 U/L (ref 15–37)
ATRIAL RATE: 83 BPM
ATRIAL RATE: 87 BPM
BACTERIA URNS QL MICRO: NEGATIVE /HPF
BARBITURATES UR QL SCN: NEGATIVE
BASOPHILS # BLD AUTO: 0 K/UL (ref 0–0.1)
BASOPHILS # BLD: 0 % (ref 0–1)
BENZODIAZ UR QL: NEGATIVE
BILIRUB SERPL-MCNC: 0.6 MG/DL (ref 0.2–1)
BILIRUB UR QL: NEGATIVE
BUN SERPL-MCNC: 15 MG/DL (ref 6–20)
BUN/CREAT SERPL: 16 (ref 12–20)
CALCIUM SERPL-MCNC: 9.6 MG/DL (ref 8.5–10.1)
CALCULATED P AXIS, ECG09: 51 DEGREES
CALCULATED P AXIS, ECG09: 66 DEGREES
CALCULATED R AXIS, ECG10: 23 DEGREES
CALCULATED R AXIS, ECG10: 28 DEGREES
CALCULATED T AXIS, ECG11: 42 DEGREES
CALCULATED T AXIS, ECG11: 47 DEGREES
CANNABINOIDS UR QL SCN: NEGATIVE
CAOX CRY URNS QL MICRO: ABNORMAL
CHLORIDE SERPL-SCNC: 106 MMOL/L (ref 97–108)
CO2 SERPL-SCNC: 27 MMOL/L (ref 21–32)
COCAINE UR QL SCN: NEGATIVE
COLOR UR: ABNORMAL
CREAT SERPL-MCNC: 0.96 MG/DL (ref 0.55–1.02)
DIAGNOSIS, 93000: NORMAL
DIAGNOSIS, 93000: NORMAL
DRUG SCRN COMMENT,DRGCM: NORMAL
EOSINOPHIL # BLD: 0.1 K/UL (ref 0–0.4)
EOSINOPHIL NFR BLD: 2 % (ref 0–7)
EPITH CASTS URNS QL MICRO: ABNORMAL /LPF
ERYTHROCYTE [DISTWIDTH] IN BLOOD BY AUTOMATED COUNT: 12.7 % (ref 11.5–14.5)
ETHANOL SERPL-MCNC: <10 MG/DL
GLOBULIN SER CALC-MCNC: 3.8 G/DL (ref 2–4)
GLUCOSE SERPL-MCNC: 110 MG/DL (ref 65–100)
GLUCOSE UR STRIP.AUTO-MCNC: NEGATIVE MG/DL
HCT VFR BLD AUTO: 36.1 % (ref 35–47)
HGB BLD-MCNC: 12.2 G/DL (ref 11.5–16)
HGB UR QL STRIP: ABNORMAL
KETONES UR QL STRIP.AUTO: NEGATIVE MG/DL
LEUKOCYTE ESTERASE UR QL STRIP.AUTO: ABNORMAL
LYMPHOCYTES # BLD AUTO: 21 % (ref 12–49)
LYMPHOCYTES # BLD: 1.3 K/UL (ref 0.8–3.5)
MCH RBC QN AUTO: 30.6 PG (ref 26–34)
MCHC RBC AUTO-ENTMCNC: 33.8 G/DL (ref 30–36.5)
MCV RBC AUTO: 90.5 FL (ref 80–99)
METHADONE UR QL: NEGATIVE
MONOCYTES # BLD: 0.5 K/UL (ref 0–1)
MONOCYTES NFR BLD AUTO: 8 % (ref 5–13)
NEUTS SEG # BLD: 4.3 K/UL (ref 1.8–8)
NEUTS SEG NFR BLD AUTO: 69 % (ref 32–75)
NITRITE UR QL STRIP.AUTO: NEGATIVE
OPIATES UR QL: NEGATIVE
P-R INTERVAL, ECG05: 204 MS
P-R INTERVAL, ECG05: 208 MS
PCP UR QL: NEGATIVE
PH UR STRIP: 6.5 [PH] (ref 5–8)
PLATELET # BLD AUTO: 227 K/UL (ref 150–400)
POTASSIUM SERPL-SCNC: 3.3 MMOL/L (ref 3.5–5.1)
PROT SERPL-MCNC: 7.2 G/DL (ref 6.4–8.2)
PROT UR STRIP-MCNC: NEGATIVE MG/DL
Q-T INTERVAL, ECG07: 368 MS
Q-T INTERVAL, ECG07: 370 MS
QRS DURATION, ECG06: 78 MS
QRS DURATION, ECG06: 82 MS
QTC CALCULATION (BEZET), ECG08: 432 MS
QTC CALCULATION (BEZET), ECG08: 445 MS
RBC # BLD AUTO: 3.99 M/UL (ref 3.8–5.2)
RBC #/AREA URNS HPF: ABNORMAL /HPF (ref 0–5)
SALICYLATES SERPL-MCNC: <1.7 MG/DL (ref 2.8–20)
SODIUM SERPL-SCNC: 141 MMOL/L (ref 136–145)
SP GR UR REFRACTOMETRY: 1.01 (ref 1–1.03)
UROBILINOGEN UR QL STRIP.AUTO: 0.2 EU/DL (ref 0.2–1)
VENTRICULAR RATE, ECG03: 83 BPM
VENTRICULAR RATE, ECG03: 87 BPM
WBC # BLD AUTO: 6.2 K/UL (ref 3.6–11)
WBC URNS QL MICRO: ABNORMAL /HPF (ref 0–4)

## 2017-04-11 PROCEDURE — 85025 COMPLETE CBC W/AUTO DIFF WBC: CPT | Performed by: NURSE PRACTITIONER

## 2017-04-11 PROCEDURE — 80307 DRUG TEST PRSMV CHEM ANLYZR: CPT | Performed by: NURSE PRACTITIONER

## 2017-04-11 PROCEDURE — 74011250636 HC RX REV CODE- 250/636: Performed by: PSYCHIATRY & NEUROLOGY

## 2017-04-11 PROCEDURE — 80053 COMPREHEN METABOLIC PANEL: CPT | Performed by: NURSE PRACTITIONER

## 2017-04-11 PROCEDURE — 65220000003 HC RM SEMIPRIVATE PSYCH

## 2017-04-11 PROCEDURE — 70450 CT HEAD/BRAIN W/O DYE: CPT

## 2017-04-11 PROCEDURE — 99285 EMERGENCY DEPT VISIT HI MDM: CPT

## 2017-04-11 PROCEDURE — 93005 ELECTROCARDIOGRAM TRACING: CPT

## 2017-04-11 PROCEDURE — 81001 URINALYSIS AUTO W/SCOPE: CPT | Performed by: NURSE PRACTITIONER

## 2017-04-11 PROCEDURE — 74011000250 HC RX REV CODE- 250: Performed by: PSYCHIATRY & NEUROLOGY

## 2017-04-11 PROCEDURE — 36415 COLL VENOUS BLD VENIPUNCTURE: CPT | Performed by: NURSE PRACTITIONER

## 2017-04-11 RX ORDER — LORAZEPAM 2 MG/ML
0.5 INJECTION INTRAMUSCULAR
Status: DISCONTINUED | OUTPATIENT
Start: 2017-04-11 | End: 2017-04-24 | Stop reason: HOSPADM

## 2017-04-11 RX ORDER — OLANZAPINE 2.5 MG/1
2.5 TABLET ORAL
Status: DISCONTINUED | OUTPATIENT
Start: 2017-04-11 | End: 2017-04-24 | Stop reason: HOSPADM

## 2017-04-11 RX ORDER — ACETAMINOPHEN 325 MG/1
650 TABLET ORAL
Status: DISCONTINUED | OUTPATIENT
Start: 2017-04-11 | End: 2017-04-24 | Stop reason: HOSPADM

## 2017-04-11 RX ORDER — BENZTROPINE MESYLATE 1 MG/1
1 TABLET ORAL
Status: DISCONTINUED | OUTPATIENT
Start: 2017-04-11 | End: 2017-04-24 | Stop reason: HOSPADM

## 2017-04-11 RX ORDER — IBUPROFEN 400 MG/1
400 TABLET ORAL
Status: DISCONTINUED | OUTPATIENT
Start: 2017-04-11 | End: 2017-04-24 | Stop reason: HOSPADM

## 2017-04-11 RX ORDER — ZOLPIDEM TARTRATE 5 MG/1
5 TABLET ORAL
Status: DISCONTINUED | OUTPATIENT
Start: 2017-04-11 | End: 2017-04-24 | Stop reason: HOSPADM

## 2017-04-11 RX ORDER — LORAZEPAM 0.5 MG/1
0.5 TABLET ORAL
Status: DISCONTINUED | OUTPATIENT
Start: 2017-04-11 | End: 2017-04-24 | Stop reason: HOSPADM

## 2017-04-11 RX ORDER — ADHESIVE BANDAGE
30 BANDAGE TOPICAL DAILY PRN
Status: DISCONTINUED | OUTPATIENT
Start: 2017-04-11 | End: 2017-04-24 | Stop reason: HOSPADM

## 2017-04-11 RX ORDER — IBUPROFEN 200 MG
1 TABLET ORAL
Status: DISCONTINUED | OUTPATIENT
Start: 2017-04-11 | End: 2017-04-24 | Stop reason: HOSPADM

## 2017-04-11 RX ORDER — BENZTROPINE MESYLATE 1 MG/ML
1 INJECTION INTRAMUSCULAR; INTRAVENOUS
Status: DISCONTINUED | OUTPATIENT
Start: 2017-04-11 | End: 2017-04-24 | Stop reason: HOSPADM

## 2017-04-11 RX ADMIN — WATER 10 MG: 1 INJECTION INTRAMUSCULAR; INTRAVENOUS; SUBCUTANEOUS at 20:51

## 2017-04-11 NOTE — IP AVS SNAPSHOT
[address] 
[phone] Patient: Christina Hopper MRN: [id number] KTR:4[birth year] You are allergic to the following No active allergies Recent Documentation Height Weight Breastfeeding? BMI OB Status Smoking Status 1.524 m 53.1 kg No 22.85 kg/m2 Postmenopausal Never Smoker Emergency Contacts Name Discharge Info Relation Home Work Mobile Mike Jaquez DISCHARGE CAREGIVER [3] Son [22] [phone] About your hospitalization You were admitted on:  April 11, 2017 You last received care in the:  [address] You were discharged on:  April 24, 2017 Unit phone number:  [phone] Why you were hospitalized Your primary diagnosis was:  Dementia Providers Seen During Your Hospitalizations Provider Role Specialty Primary office phone Elvis Herr MD Attending Provider Emergency Medicine [phone] Derek Scott MD Attending Provider Psychiatry [phone] Your Primary Care Physician (PCP) Primary Care Physician Office Phone Office Fax Hannah Clark [phone] Follow-up Information Follow up With Details Comments Contact Info Dr. Leonid Miller and Dr. Emerson Galeas On 4/28/2017 Doctors to visit Patient in the home. [facility] [address] [place], [address] 
[phone] Amarilys Wolfe NP On 4/28/2017 Psychiatric nurse practitioner will visit Patient in the home. Antoine Tripathi 115[address] [facility] 1500 [facility]  
[phone] Tre Llanos MD   [address] [place] [address] [address] [facility] [address] 
[phone] Hector Koch NP On 5/1/2017 You have a 9:20am appointment at the neurology office. Please arrive by 9:00am to complete paperwork. Nurse Hector Koch works with Dr. Janiya Patino. [facility], [address] [facility], [address] 
[phone] Current Discharge Medication List  
  
START taking these medications Dose & Instructions Dispensing Information Comments Morning Noon Evening Bedtime  
 amoxicillin-clavulanate 875-125 mg per tablet Commonly known as:  AUGMENTIN Your last dose was: Your next dose is:    
   
   
 Dose:  1 Tab Take 1 Tab by mouth every twelve (12) hours for 5 days. Indications: BACTERIAL URINARY TRACT INFECTION Quantity:  11 Tab Refills:  0  
     
   
   
   
  
 * haloperidol 2 mg tablet Commonly known as:  HALDOL Your last dose was: Your next dose is:    
   
   
 Dose:  2 mg Take 1 Tab by mouth nightly. Indications: PSYCHOTIC DISORDER Quantity:  30 Tab Refills:  0  
     
   
   
   
  
 * haloperidol 5 mg tablet Commonly known as:  HALDOL Your last dose was: Your next dose is:    
   
   
 Dose:  5 mg Take 1 Tab by mouth nightly. Indications: PSYCHOTIC DISORDER Quantity:  30 Tab Refills:  0  
     
   
   
   
  
 memantine 10 mg tablet Commonly known as:  Dinorah Cava Your last dose was: Your next dose is:    
   
   
 Dose:  10 mg Take 1 Tab by mouth two (2) times a day. Indications: MODERATE TO SEVERE ALZHEIMER'S TYPE DEMENTIA Quantity:  60 Tab Refills:  0  
     
   
   
   
  
 * Notice: This list has 2 medication(s) that are the same as other medications prescribed for you. Read the directions carefully, and ask your doctor or other care provider to review them with you. CONTINUE these medications which have NOT CHANGED Dose & Instructions Dispensing Information Comments Morning Noon Evening Bedtime  
 calcium 500 mg Tab Your last dose was: Your next dose is:    
   
   
 Dose:  500 mg Take 500 mg by mouth daily. Indications: HYPOCALCEMIA PREVENTION Refills:  0  
     
   
   
   
  
 fish oil-omega-3 fatty acids 340-1,000 mg capsule Your last dose was: Your next dose is: Dose:  1 Cap Take 1 Cap by mouth daily. Indications: SUPPLEMENT Refills:  0  
     
   
   
   
  
 vitamin E acetate 400 unit Cap capsule Your last dose was: Your next dose is:    
   
   
 Dose:  400 Units Take 400 Units by mouth daily. Indications: SUPPLEMENT Refills:  0 STOP taking these medications OLANZapine 2.5 mg tablet Commonly known as:  ZyPREXA Where to Get Your Medications Information on where to get these meds will be given to you by the nurse or doctor. ! Ask your nurse or doctor about these medications  
  amoxicillin-clavulanate 875-125 mg per tablet  
 haloperidol 2 mg tablet  
 haloperidol 5 mg tablet  
 memantine 10 mg tablet Discharge Instructions DISCHARGE SUMMARY 
 
NAME:Bethel Hopper : 1945 MRN: [id number] The patient Richard Sanchez He exhibits the ability to control behavior in a less restrictive environment. Patient's level of functioning is improving. No assaultive/destructive behavior has been observed for the past 24 hours. No suicidal/homicidal threat or behavior has been observed for the past 24 hours. There is no evidence of serious medication side effects. Patient has not been in physical or protective restraints for at least the past 24 hours. If weapons involved, how are they secured? No weapons involved. Is patient aware of and in agreement with discharge plan? Patient and family are aware and in agreement with discharge plan. Arrangements for medication:  Prescriptions given to patient. Patient is a smoker and needs referral for smoking cessation? Patient is not a smoker. 1.  Patient referred to the following for smoking cessation with an appointment: 2. Patient was offered medication to assist with smoking cessation at discharge: 3.  Education for smoking cessation added to discharge instructions: Patient has a history of substance/alcohol abuse and requires a referral for treatment? No 
1. Patient referred to the following for substance/alcohol abuse treatment with an appointment: 2. Patient was offered medication to assist with alcohol cessation at discharge: 3.  Education for substance/alcohol abuse added to discharge instructions:  
 
Copy of discharge instructions to provider?:  Yes, Dr. Aristides Daniels and Simeon Zaman NP ([phone]) Arrangements for transportation home: Family to  Psychiatric Advanced Care Directives- no 
Name of Decision maker if patient has Psychiatric Care Directive: None Patient was offered information, patient declined information. Keep all follow up appointments as scheduled, continue to take prescribed medications per physician instructions. Mental health crisis number:  622 or your local mental health crisis line number at [phone] DISCHARGE SUMMARY from Nurse The following personal items are in your possession at time of discharge: 
 
Dental Appliances: At bedside, Lowers, Uppers, With patient Visual Aid: None Home Medications: None Jewelry: None Clothing: At bedside, Footwear, Pants, Shirt, Socks, Undergarments, With patient Other Valuables: None Personal Items Sent to Safe: non PATIENT INSTRUCTIONS: 
 
What to do at Home: 
Recommended activity: Activity as tolerated, If you experience any of the following symptoms increased agitation and/or altered thought process, please follow up with 911 [phone] . *  Please give a list of your current medications to your Primary Care Provider. *  Please update this list whenever your medications are discontinued, doses are 
    changed, or new medications (including over-the-counter products) are added. *  Please carry medication information at all times in case of emergency situations. These are general instructions for a healthy lifestyle: No smoking/ No tobacco products/ Avoid exposure to second hand smoke Surgeon General's Warning:  Quitting smoking now greatly reduces serious risk to your health. Obesity, smoking, and sedentary lifestyle greatly increases your risk for illness A healthy diet, regular physical exercise & weight monitoring are important for maintaining a healthy lifestyle You may be retaining fluid if you have a history of heart failure or if you experience any of the following symptoms:  Weight gain of 3 pounds or more overnight or 5 pounds in a week, increased swelling in our hands or feet or shortness of breath while lying flat in bed. Please call your doctor as soon as you notice any of these symptoms; do not wait until your next office visit. Recognize signs and symptoms of STROKE: 
 
F-face looks uneven A-arms unable to move or move unevenly S-speech slurred or non-existent T-time-call 911 as soon as signs and symptoms begin-DO NOT go Back to bed or wait to see if you get better-TIME IS BRAIN. Warning Signs of HEART ATTACK Call 911 if you have these symptoms: 
? Chest discomfort. Most heart attacks involve discomfort in the center of the chest that lasts more than a few minutes, or that goes away and comes back. It can feel like uncomfortable pressure, squeezing, fullness, or pain. ? Discomfort in other areas of the upper body. Symptoms can include pain or discomfort in one or both arms, the back, neck, jaw, or stomach. ? Shortness of breath with or without chest discomfort. ? Other signs may include breaking out in a cold sweat, nausea, or lightheadedness. Don't wait more than five minutes to call [address]! Fast action can save your life. Calling 911 is almost always the fastest way to get lifesaving treatment. Emergency Medical Services staff can begin treatment when they arrive  up to an hour sooner than if someone gets to the hospital by car. The discharge information has been reviewed with the patient. The patient verbalized understanding. Discharge medications reviewed with the patient and appropriate educational materials and side effects teaching were provided. Discharge Orders None [facility] Announcement We are excited to announce that we are making your provider's discharge notes available to you in [facility]. You will see these notes when they are completed and signed by the physician that discharged you from your recent hospital stay. If you have any questions or concerns about any information you see in [employer], please call the Health Information Department where you were seen or reach out to your Primary Care Provider for more information about your plan of care. General Information Please provide this summary of care documentation to your next provider. Patient Signature:  ____________________________________________________________ Date:  ____________________________________________________________  
  
[facility] Provider Signature:  ____________________________________________________________ Date:  ____________________________________________________________

## 2017-04-11 NOTE — TELEPHONE ENCOUNTER
Patient seen in ER. Plan for admission to inpatient psychiatry. Will follow along peripherally. Attempted to call daughter, no answer on phone.    Chino Conroy MD

## 2017-04-11 NOTE — ROUTINE PROCESS
TRANSFER - OUT REPORT:    Verbal report given to Arsen Thakkar RN(name) on [employer] He  being transferred to [place] [address](unit) for routine progression of care       Report consisted of patients Situation, Background, Assessment and   Recommendations(SBAR). Information from the following report(s) SBAR, ED Summary, [facility] and Recent Results was reviewed with the receiving nurse. Lines:       Opportunity for questions and clarification was provided.       Patient transported with:   RN  HPD

## 2017-04-11 NOTE — BH NOTES
1900:  TRANSFER - IN REPORT:    Verbal report received from [facility] on [employer] [facility] He  being received from the ED for routine progression of care      Report consisted of patients Situation, Background, Assessment and   Recommendations(SBAR). Information from the following report(s) SBAR, Kardex, ED Summary and Recent Results was reviewed with the receiving nurse. Opportunity for questions and clarification was provided. Patient assessed for TDO, but has not yet been served. Assessment completed upon patients arrival to unit and care assumed.
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
## 2017-04-11 NOTE — ED NOTES
Pt is calm and cooperative at this time. VSS. O2 sats 99% on RA. Pt reports pain 0/10. Family at the bedside. Pt being transferred to [address] [place] [address] escorted by RN and HPD along with family.

## 2017-04-11 NOTE — PROGRESS NOTES
Admission Medication Reconciliation:    Information obtained from: Patient's daughter    Chief Complaint for this Admission:  Mental Health Problem    Allergies:  Review of patient's allergies indicates no known allergies. Comments/Recommendations: Discussed PTA medications with the patient's daughter. The daughter reported that the patient had been on haloperidol 5 mg QHS PRN a few days ago but refused to take it after the first dose because it made her feel worse. The daughter also notes that there is another medication that she is taking but she could not remember what it was called. She said the drug started with \"dol\". I asked if it was donepezil and she initially agreed but upon further questioning, she became unsure. I also asked if it was the Haldol but she still seemed hesitant. Prior to Admission Medications:   Prior to Admission Medications   Prescriptions Last Dose Informant Patient Reported? Taking? OLANZapine (ZYPREXA) 2.5 mg tablet 4/10/2017  No Yes   Sig: Take 1 Tab by mouth nightly. VITAMIN E, DL,TOCOPHERYL ACET, (VITAMIN E ACETATE) 400 unit cap capsule 4/10/2017  Yes Yes   Sig: Take  by mouth daily. calcium 500 mg Tab 4/10/2017  Yes Yes   Sig: Take 1 Tab by mouth. fish oil-omega-3 fatty acids 340-1,000 mg capsule 4/10/2017  Yes Yes   Sig: Take 1 Cap by mouth daily.       Facility-Administered Medications: None     Silvina American  PharmD Candidate 2017  TERESA Yousif

## 2017-04-11 NOTE — IP AVS SNAPSHOT
Current Discharge Medication List  
  
START taking these medications Dose & Instructions Dispensing Information Comments Morning Noon Evening Bedtime  
 amoxicillin-clavulanate 875-125 mg per tablet Commonly known as:  AUGMENTIN Your last dose was: Your next dose is:    
   
   
 Dose:  1 Tab Take 1 Tab by mouth every twelve (12) hours for 5 days. Indications: BACTERIAL URINARY TRACT INFECTION Quantity:  11 Tab Refills:  0  
     
   
   
   
  
 * haloperidol 2 mg tablet Commonly known as:  HALDOL Your last dose was: Your next dose is:    
   
   
 Dose:  2 mg Take 1 Tab by mouth nightly. Indications: PSYCHOTIC DISORDER Quantity:  30 Tab Refills:  0  
     
   
   
   
  
 * haloperidol 5 mg tablet Commonly known as:  HALDOL Your last dose was: Your next dose is:    
   
   
 Dose:  5 mg Take 1 Tab by mouth nightly. Indications: PSYCHOTIC DISORDER Quantity:  30 Tab Refills:  0  
     
   
   
   
  
 memantine 10 mg tablet Commonly known as:  [employer]wen[employer] Grade Your last dose was: Your next dose is:    
   
   
 Dose:  10 mg Take 1 Tab by mouth two (2) times a day. Indications: MODERATE TO SEVERE ALZHEIMER'S TYPE DEMENTIA Quantity:  60 Tab Refills:  0  
     
   
   
   
  
 * Notice: This list has 2 medication(s) that are the same as other medications prescribed for you. Read the directions carefully, and ask your doctor or other care provider to review them with you. CONTINUE these medications which have NOT CHANGED Dose & Instructions Dispensing Information Comments Morning Noon Evening Bedtime  
 calcium 500 mg Tab Your last dose was: Your next dose is:    
   
   
 Dose:  500 mg Take 500 mg by mouth daily. Indications: HYPOCALCEMIA PREVENTION Refills:  0  
     
   
   
   
  
 fish oil-omega-3 fatty acids 340-1,000 mg capsule Your last dose was: Your next dose is:    
   
   
 Dose:  1 Cap Take 1 Cap by mouth daily. Indications: SUPPLEMENT Refills:  0  
     
   
   
   
  
 vitamin E acetate 400 unit Cap capsule Your last dose was: Your next dose is:    
   
   
 Dose:  400 Units Take 400 Units by mouth daily. Indications: SUPPLEMENT Refills:  0 STOP taking these medications OLANZapine 2.5 mg tablet Commonly known as:  ZyPREXA Where to Get Your Medications Information on where to get these meds will be given to you by the nurse or doctor. ! Ask your nurse or doctor about these medications  
  amoxicillin-clavulanate 875-125 mg per tablet  
 haloperidol 2 mg tablet  
 haloperidol 5 mg tablet  
 memantine 10 mg tablet
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
## 2017-04-11 NOTE — TELEPHONE ENCOUNTER
Pt's daughter Milton Alves called stating that the pt has been taking her medication as directed. However [facility] state's that she seemed to be worse last night she couldn't sleep last night,was wanting to get out of the door,pt was also trying to hurt herself. [facility] need's a call ASAP. [facility] doesn't know if she should take her to the ER or not. [facility] # [phone].

## 2017-04-12 LAB
CHOLEST SERPL-MCNC: 136 MG/DL
GLUCOSE P FAST SERPL-MCNC: 87 MG/DL (ref 65–100)
HDLC SERPL-MCNC: 68 MG/DL
HDLC SERPL: 2 {RATIO} (ref 0–5)
LDLC SERPL CALC-MCNC: 46.6 MG/DL (ref 0–100)
LIPID PROFILE,FLP: NORMAL
TRIGL SERPL-MCNC: 107 MG/DL (ref ?–150)
TSH SERPL DL<=0.05 MIU/L-ACNC: 0.67 UIU/ML (ref 0.36–3.74)
VLDLC SERPL CALC-MCNC: 21.4 MG/DL

## 2017-04-12 PROCEDURE — 74011250637 HC RX REV CODE- 250/637: Performed by: PSYCHIATRY & NEUROLOGY

## 2017-04-12 PROCEDURE — 65220000003 HC RM SEMIPRIVATE PSYCH

## 2017-04-12 PROCEDURE — 36415 COLL VENOUS BLD VENIPUNCTURE: CPT | Performed by: PSYCHIATRY & NEUROLOGY

## 2017-04-12 PROCEDURE — 82947 ASSAY GLUCOSE BLOOD QUANT: CPT | Performed by: PSYCHIATRY & NEUROLOGY

## 2017-04-12 PROCEDURE — 80061 LIPID PANEL: CPT | Performed by: PSYCHIATRY & NEUROLOGY

## 2017-04-12 PROCEDURE — 84443 ASSAY THYROID STIM HORMONE: CPT | Performed by: PSYCHIATRY & NEUROLOGY

## 2017-04-12 RX ORDER — MEMANTINE HYDROCHLORIDE 10 MG/1
5 TABLET ORAL DAILY
Status: DISCONTINUED | OUTPATIENT
Start: 2017-04-13 | End: 2017-04-13

## 2017-04-12 RX ORDER — LANOLIN ALCOHOL/MO/W.PET/CERES
400 CREAM (GRAM) TOPICAL DAILY
Status: DISCONTINUED | OUTPATIENT
Start: 2017-04-13 | End: 2017-04-24 | Stop reason: HOSPADM

## 2017-04-12 RX ORDER — CALCIUM CARBONATE 500(1250)
500 TABLET ORAL DAILY
Status: DISCONTINUED | OUTPATIENT
Start: 2017-04-13 | End: 2017-04-24 | Stop reason: HOSPADM

## 2017-04-12 RX ORDER — OLANZAPINE 2.5 MG/1
2.5 TABLET ORAL
Status: DISCONTINUED | OUTPATIENT
Start: 2017-04-12 | End: 2017-04-13

## 2017-04-12 RX ADMIN — OLANZAPINE 2.5 MG: 2.5 TABLET, FILM COATED ORAL at 21:02

## 2017-04-12 RX ADMIN — ZOLPIDEM TARTRATE 5 MG: 5 TABLET ORAL at 21:02

## 2017-04-12 NOTE — PROGRESS NOTES
Physical Therapy Screening:  Services are not indicated at this time. An InBasket screening referral was triggered for physical therapy based on results obtained during the nursing admission assessment. The patients chart was reviewed and the patient is not appropriate for a skilled therapy evaluation at this time. Please consult physical therapy if any therapy needs arise. Thank you.     Rosalinda Andre, PT

## 2017-04-12 NOTE — PROGRESS NOTES
Patient seen as courtesy consult as she is CARE MORE MEMBER. She has not been to our office to see any physician including psychiatrist    Noted admitted with hallucination with dementia. Answer in 1 word, YES OR NO, not sure is random or not    Moving around ok, vital signs are ok too    Denies any pain, but avoiding any communication. Speak very limited [address] Call her first emergence contact, no one  phone & answering machine is full, so can't leave message. Please do not hesitate if need any medical consultation during hospitalization. We do have psychiatrist in clinic, so please contact us for post discharge care. Dr. Brandi Mcdermott  Cell # [phone].   Answering service # [phone]
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
## 2017-04-12 NOTE — INTERDISCIPLINARY ROUNDS
Behavioral Health Interdisciplinary Rounds     Patient Name: Ollie Hopper  Age: 66 y.o. Room/Bed:  [facility]  Primary Diagnosis: <principal problem not specified>   Admission Status: TDO     Readmission within 30 days: no  Power of  in place: no  Patient requires a blocked bed: no          Reason for blocked bed: n/a    VTE Prophylaxis: Not indicated  Mobility needs/Fall risk: yes    Nutritional Plan: no  Consults:          Labs/Testing due today?: yes    Sleep hours: 5.5       Participation in Care/Groups:  No -New Admission  Medication Compliant?: Yes  PRNS (last 24 hours):  Antipsychotic (IM)    Restraints (last 24 hours):  no  Substance Abuse:  no  CIWA (range last 24 hours):  COWS (range last 24 hours):   Alcohol screening (AUDIT) completed -  AUDIT Score: 0  If applicable, date SBIRT discussed in treatment team AND documented:   Tobacco - patient is a smoker: no  Date tobacco education completed by RN: n/a  24 hour chart check complete:      Patient goal(s) for today:   Treatment team focus/goals:   LOS:  1  Expected LOS:   Financial concerns/prescription coverage:    Date of last family contact:       Family requesting physician contact today:    Discharge plan:        Outpatient provider(s):     Participating treatment team members: Ollie Hopper,

## 2017-04-12 NOTE — BH NOTES
0010:  Patient appears to be sleeping comfortably at this time with regular, unlabored respirations and no signs/symptoms of pain or discomfort. Will maintain q 15 minute safety checks.
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
## 2017-04-12 NOTE — BH NOTES
2245:  Skin Assessment performed by Gordon Stone RN and DR, LPN  Patient's skin is clean, dry and intact with no cuts or abrasions noted. She does have an old, healed, mid-line surgical scar and some bruising to the knuckles of her Left hand as well as some bruising to her inner Left arm from lab draws. Jewelry: Patient is wearing no jewelry. Patient is wearing upper/lower dentures. Pressure Ulcer Documentation  (COMPLETE ONE LABEL PER PRESSURE ULCER)  For further information, please review corresponding Wound Care flowsheet. Jasper Silvestre He has:    No pressure ulcer noted and pressure ulcer prevention initiated.     J Carlos Malin RN

## 2017-04-12 NOTE — PROGRESS NOTES
Laboratory Monitoring for Antipsychotics and Mood Stabilizers    This patient is currently prescribed the following medication(s):   Current Facility-Administered Medications   Medication Dose Route Frequency    OLANZapine (ZyPREXA) tablet 2.5 mg  2.5 mg Oral QHS    [START ON 4/13/2017] fish oil-omega-3 fatty acids 340-1,000 mg capsule 1 Cap  1 Cap Oral DAILY    [START ON 4/13/2017] vitamin E acetate capsule 400 Units  400 Units Oral DAILY    [START ON 4/13/2017] calcium carbonate (OS-JOSEPH) tablet 500 mg [elemental]  500 mg Oral DAILY    [START ON 4/13/2017] memantine (NAMENDA) tablet 5 mg  5 mg Oral DAILY       The following labs have been completed for monitoring of antipsychotics and/or mood stabilizers:    Height, Weight, BMI Estimation  Estimated body mass index is 23.53 kg/(m^2) as calculated from the following:    Height as of this encounter: 152.4 cm (60\"). Weight as of this encounter: 54.7 kg (120 lb 8 oz). Vital Signs/Blood Pressure  Visit Vitals    /87    Pulse 90    Temp 97.8 °F (36.6 °C)    Resp 16    Ht 152.4 cm (60\")    Wt 54.7 kg (120 lb 8 oz)    SpO2 98%    Breastfeeding No    BMI 23.53 kg/m2       Renal Function, Hepatic Function and Chemistry  Estimated Creatinine Clearance: 41.1 mL/min (based on Cr of 0.96). Lab Results   Component Value Date/Time    Sodium 141 04/11/2017 03:54 PM    Potassium 3.3 04/11/2017 03:54 PM    Chloride 106 04/11/2017 03:54 PM    CO2 27 04/11/2017 03:54 PM    Anion gap 8 04/11/2017 03:54 PM    BUN 15 04/11/2017 03:54 PM    Creatinine 0.96 04/11/2017 03:54 PM    BUN/Creatinine ratio 16 04/11/2017 03:54 PM    Bilirubin, total 0.6 04/11/2017 03:54 PM    Protein, total 7.2 04/11/2017 03:54 PM    Albumin 3.4 04/11/2017 03:54 PM    Globulin 3.8 04/11/2017 03:54 PM    A-G Ratio 0.9 04/11/2017 03:54 PM    ALT (SGPT) 23 04/11/2017 03:54 PM    Alk.  phosphatase 62 04/11/2017 03:54 PM       Lab Results   Component Value Date/Time    Glucose 87 04/12/2017 06:14 AM       No results found for: HBA1C, HGBE8, [id number]    Hematology  Lab Results   Component Value Date/Time    WBC 6.2 04/11/2017 03:54 PM    RBC 3.99 04/11/2017 03:54 PM    HGB 12.2 04/11/2017 03:54 PM    HCT 36.1 04/11/2017 03:54 PM    MCV 90.5 04/11/2017 03:54 PM    MCH 30.6 04/11/2017 03:54 PM    MCHC 33.8 04/11/2017 03:54 PM    RDW 12.7 04/11/2017 03:54 PM    PLATELET 214 41/12/8897 03:54 PM       Lipids  Lab Results   Component Value Date/Time    Cholesterol, total 136 04/12/2017 06:14 AM    HDL Cholesterol 68 04/12/2017 06:14 AM    LDL, calculated 46.6 04/12/2017 06:14 AM    Triglyceride 107 04/12/2017 06:14 AM    CHOL/HDL Ratio 2.0 04/12/2017 06:14 AM       Thyroid Function  Lab Results   Component Value Date/Time    TSH 0.67 04/12/2017 06:14 AM       Assessment/Plan:  Recommended baseline laboratory monitoring has been completed based on this patient's current medication regimen.          Riky Grande, PharmD, [address], BCPS

## 2017-04-12 NOTE — PROGRESS NOTES
Problem: Dementia-BEHAVIORAL HEALTH (Adult/Pediatric)  Goal: *STG: Participates in treatment plan  Outcome: Progressing Towards Goal  Pt anxious, redirectable. Tries to escape when doors are opened. Understands very little [place]. Does not appear to be hallucinating. Eating well, smiles at times.  Will continue to monitor

## 2017-04-12 NOTE — BH NOTES
Admission Note:    Patient admitted under the care of Dr. Thiago Liu with diagnosis of Dementia    Admission Status:  TDO    Reason for Admission: Wandering/Insomnia/Aggressiveness/Auditory and Visual Hallucinations    Dr. Alcides Lorenzo called for orders: Geriatric Protocol plus a urine culture as not enough urine sent from ED to perform culture    Patient condition on arrival: anxious, but pleasant and cooperative while family was here. Patient became angry aggressive and rushing doors/fighting staff when family departed. NAYELI Winston Administered    Patient's statements/questions/concerns: Patient speaks only Chinese/Cantonese - speaking nonsense at times per family. At other times, patient asking if she is going to die. She is frightened, confused and angry.

## 2017-04-12 NOTE — BH NOTES
PRN Medication Documentation    Specific patient behavior that led to need for PRN medication: combative,beating on doors,spitting,trying to hit a familyStaff interventions attempted prior to PRN being given: redirectionPRN medication given: geodon im  Patient response/effectiveness of PRN medication: resting,effective  Upon admission pt became very restless and agitated,trying to leave off unit with family,trying to \"kick\"at her . Beating on doors,refusing nursing care,vital signs. security called to assist  Nursing staff with patient

## 2017-04-12 NOTE — PROGRESS NOTES
Problem: Dementia-BEHAVIORAL HEALTH (Adult/Pediatric)  Goal: *STG: Remains safe in hospital  Patient is sitting quietly in the dinning room. Alert, calm and cooperative. No distress noted.   Patient remains safe in hospital.

## 2017-04-13 PROCEDURE — 74011250637 HC RX REV CODE- 250/637: Performed by: PSYCHIATRY & NEUROLOGY

## 2017-04-13 PROCEDURE — 65220000003 HC RM SEMIPRIVATE PSYCH

## 2017-04-13 RX ORDER — MEMANTINE HYDROCHLORIDE 10 MG/1
5 TABLET ORAL 2 TIMES DAILY
Status: DISCONTINUED | OUTPATIENT
Start: 2017-04-13 | End: 2017-04-16

## 2017-04-13 RX ORDER — OLANZAPINE 2.5 MG/1
2.5 TABLET ORAL 2 TIMES DAILY
Status: DISCONTINUED | OUTPATIENT
Start: 2017-04-13 | End: 2017-04-16

## 2017-04-13 RX ADMIN — OLANZAPINE 2.5 MG: 2.5 TABLET, FILM COATED ORAL at 21:55

## 2017-04-13 RX ADMIN — ZOLPIDEM TARTRATE 5 MG: 5 TABLET ORAL at 21:55

## 2017-04-13 RX ADMIN — MEMANTINE HYDROCHLORIDE 5 MG: 10 TABLET ORAL at 10:11

## 2017-04-13 RX ADMIN — MEMANTINE HYDROCHLORIDE 5 MG: 10 TABLET ORAL at 21:55

## 2017-04-13 NOTE — PROGRESS NOTES
Problem: Falls - Risk of  Goal: *Absence of falls  Outcome: Progressing Towards Goal  Lying quietly in bed , resting quietly , respirations even and unlabored   Bathroom light on for safety , bed alarm on , maintaining Q15 min checks for safety     0600- Pleasant , wants to be helpful with other peers , took off bed alarm from her bed rolled it up set it on table in her room

## 2017-04-13 NOTE — BH NOTES
56- pt and Ines Yuliana (daughter [phone]) aware hearing scheduled today at 4 pm.   Pt eating breakfast 95% tolerating oral fluid. Refused scheduled medications. 1002- pt with Sneha ENCINAS. Pt states she believes she has magic in her body that will heal her. Pt agrees to take one medication. Scheduled Namenda given. Pt eating 95% of lunch tolerating oral fluids well. Refuses shower or bath. Intrusive with poor boundaries. Poor safety awareness. Attempting physical contact with peers.
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
## 2017-04-13 NOTE — PROGRESS NOTES
NUTRITION COMPLETE ASSESSMENT    RECOMMENDATIONS:   1. Regular  2. If PO changes and less than 75% will add Ensure Enlive x 1 day  3. Please obtain standing measured weight     Interventions/Plan:   Food/Nutrient Delivery:         meals and snacks    Nutrition Education:     Coordination of Care:    Nutrition Counseling:        Assessment:   Reason for Assessment:     [x]BPA/MST (score 2, unsure wt loss) Referral     Diet: Regular  Supplements: N/A  Nutritionally Significant Medications: [x] Reviewed & Includes: Oscal, Fish Oil, Vit E  Meal Intake: Per NSG %    Subjective:  Like cold water, tea with lemon. Lower dentures loose. Objective:  Hx osteopenia, Rx Oscal, ? Dairy, vit D intake. Consider check Vit D status. Admit mild dementia and psychotic behavior; Rx Namenda. Appetite good per NSG eating %. Appears lower dental plate is loose and provided denture adhesive to NSG. No hx dysphagia. No constipation. Skin intact. Accepting fluids well. Admit wt is estimated. Last .2# (4/10/17), 120.6# (4/4/17), 132.4# 2/9/17); Appears slight net wt loss 7.2# (5%) x past 60 days. ? UBW, difficult to communicate with patient. Estimated Nutrition Needs:   Kcals/day: 1300 Kcals/day  Protein: 60 g (~1.1 gm/kg estimated wt)  Fluid: 1400 ml (30 mL/kg estimated admit wt)     Based On:  Agueda [place] (MSJ x 1.3)  Weight Used: Estimated 54.7 kg    Pt expected to meet estimated nutrient needs:  [x]   Yes, if appetite remains good  Comparative Standards:  ;  ;      Nutrition Diagnosis:   1.  (No Nutrition Dx at this time)     Goals:     Consume % all meals daily until discharge     Monitoring & Evaluation:    - Total energy intake, Protein intake, Oral fluids amount   - Weight/weight change, Protein profile     Previous Nutrition Goals Met:  N/A  Previous Recommendations:      N/A    Education & Discharge Needs:   [x] None Identified   [x] Participated in care plan, discharge planning, and/or interdisciplinary rounds        Cultural, Caodaism and ethnic food preferences identified:    (Rashad, Elizabeth); Daughter to give NSG food preferences PRN    Skin Integrity: [x]Intact  Edema: [x]None  Last BM: 4/11/17  Food Allergies: [x]NKA    Anthropometrics:    Weight Loss Metrics 4/12/2017 4/11/2017 4/10/2017 4/4/2017 2/9/2017 3/30/2012   Today's Wt 120 lb 8 oz - 125 lb 3.2 oz 120 lb 9.6 oz 132 lb 6.4 oz 131 lb   BMI - 23.53 kg/m2 24.45 kg/m2 23.55 kg/m2 25.86 kg/m2 25.58 kg/m2      Last 3 Recorded Weights in this Encounter    04/11/17 1436 04/12/17 0800   Weight: 56.7 kg (125 lb) 54.7 kg (120 lb 8 oz)      Weight Source: Estimated  Height: 5' (152.4 cm),    Body mass index is 23.53 kg/(m^2). IBW : 45.4 kg (100 lb),    Usual Body Weight:  (Unknown),      Labs:    Lab Results   Component Value Date/Time    Sodium 141 04/11/2017 03:54 PM    Potassium 3.3 04/11/2017 03:54 PM    Chloride 106 04/11/2017 03:54 PM    CO2 27 04/11/2017 03:54 PM    Glucose 87 04/12/2017 06:14 AM    BUN 15 04/11/2017 03:54 PM    Creatinine 0.96 04/11/2017 03:54 PM    Calcium 9.6 04/11/2017 03:54 PM    Albumin 3.4 04/11/2017 03:54 PM     No results found for: HBA1C, HGBE8, SAS4NLZH, QHG8QQEU  Lab Results   Component Value Date/Time    Glucose 87 04/12/2017 06:14 AM      Lab Results   Component Value Date/Time    ALT (SGPT) 23 04/11/2017 03:54 PM    AST (SGOT) 16 04/11/2017 03:54 PM    Alk.  phosphatase 62 04/11/2017 03:54 PM    Bilirubin, total 0.6 04/11/2017 03:54 PM        Lisandra Hernandez RD
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
## 2017-04-13 NOTE — INTERDISCIPLINARY ROUNDS
Behavioral Health Interdisciplinary Rounds     Patient Name: Bridget Hopper  Age: 66 y.o.   Room/Bed:  [facility]/  Primary Diagnosis: Dementia   Admission Status: TDO Hearing this am     Readmission within 30 days: no  Power of  in place: no  Patient requires a blocked bed: no          Reason for blocked bed:     VTE Prophylaxis: no  Mobility needs/Fall risk: no    Nutritional Plan: no  Consults: *        Labs/Testing due today?: no    Sleep hours:  4.5      Participation in Care/Groups:  no  Medication Compliant?:   PRNS (last 24 hours): sleeping aid     Restraints (last 24 hours):  no  Substance Abuse:  no  CIWA (range last 24 hours):  COWS (range last 24 hours):   Alcohol screening (AUDIT) completed -  AUDIT Score: 0  If applicable, date SBIRT discussed in treatment team AND documented:   Tobacco - patient is a smoker: no   Date tobacco education completed by RN:   24 hour chart check complete: yes    Patient goal(s) for today:   Treatment team focus/goals:   LOS:  2  Expected LOS:   Financial concerns/prescription coverage:  no  Date of last family contact:     Family requesting physician contact today:  no  Discharge plan:       Outpatient provider(s):     Participating treatment team members: Bridget Hopper,

## 2017-04-14 PROCEDURE — 74011250637 HC RX REV CODE- 250/637: Performed by: PSYCHIATRY & NEUROLOGY

## 2017-04-14 PROCEDURE — 65220000003 HC RM SEMIPRIVATE PSYCH

## 2017-04-14 RX ADMIN — MEMANTINE HYDROCHLORIDE 5 MG: 10 TABLET ORAL at 21:06

## 2017-04-14 RX ADMIN — OLANZAPINE 2.5 MG: 2.5 TABLET, FILM COATED ORAL at 09:52

## 2017-04-14 RX ADMIN — OLANZAPINE 2.5 MG: 2.5 TABLET, FILM COATED ORAL at 16:15

## 2017-04-14 RX ADMIN — VITAMIN E CAP 400 UNIT 400 UNITS: 400 CAP at 09:53

## 2017-04-14 RX ADMIN — CALCIUM CARBONATE 500 MG: 1250 TABLET ORAL at 09:51

## 2017-04-14 RX ADMIN — OLANZAPINE 2.5 MG: 2.5 TABLET, FILM COATED ORAL at 21:06

## 2017-04-14 RX ADMIN — MEMANTINE HYDROCHLORIDE 5 MG: 10 TABLET ORAL at 09:52

## 2017-04-14 RX ADMIN — Medication 1 CAPSULE: at 09:52

## 2017-04-14 RX ADMIN — ZOLPIDEM TARTRATE 5 MG: 5 TABLET ORAL at 22:07

## 2017-04-14 NOTE — BH NOTES
GROUP THERAPY PROGRESS NOTE    Veverly Nab He participated in a Morning Process Group on the [employer], with a focus identifying feelings, planning for the day, and singing. Group time: 40 minutes. Personal goal for participation: To increase the capacity to shift ones mood, prepare for the day, and share in group singing. Goal orientation: The patient will be able to prepare for the day through group singing. Group therapy participation: When prompted, this patient participated in the group. Therapeutic interventions reviewed and discussed: The group members were introduce themselves by first names and participate in group singing as a way to increase their oxygen and blood flow and begin their day on a positive note. They were also asked to join in singing several songs. Impression of participation: The patient know very little [place] but seemed to smile at a couple of the songs. She said her son also made music. She seemed depressed and anxious, standing up and moving to sit in another chair. It looked like she wanted to be nearer the main nursing desk on the unit. No SI/HI or overt psychosis noted. She was alert but slightly disoriented due to the language problem.

## 2017-04-14 NOTE — BH NOTES
PRN Medication Documentation    Specific patient behavior that led to need for PRN medication: paranoid,sl restless  Staff interventions attempted prior to PRN being given:redirection  PRN medication given: zyprexa  Patient response/effectiveness of PRN medication: good   aware

## 2017-04-14 NOTE — PROGRESS NOTES
Problem: Discharge Planning  Goal: *Knowledge of medication management  Outcome: Not Progressing Towards Goal  Variance: Patient Condition
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
## 2017-04-14 NOTE — PROGRESS NOTES
Problem: Falls - Risk of  Goal: *Absence of falls  Outcome: Progressing Towards Goal  Lying in chair in day room , eyes closed, appears to sleep . Respirations even and unlabored , in line of sight of staff for safety     0500- walks fast at times around the unit w/ her clothes in hand , trying doors to get off unit .   Brought denture cup with her to day room , not wearing dentures and no dentures in cup .    0600- found dentures in bed linen

## 2017-04-14 NOTE — INTERDISCIPLINARY ROUNDS
Behavioral Health Interdisciplinary Rounds     Patient Name: Araceli Hopper  Age: 66 y.o.   Room/Bed:  [facility]/  Primary Diagnosis: Dementia   Admission Status: Involuntary Commitment     Readmission within 30 days: no  Power of  in place: no  Patient requires a blocked bed: yes          Reason for blocked bed:     VTE Prophylaxis: No  Mobility needs/Fall risk: yes    Nutritional Plan: no  Consults:        Labs/Testing due today?: no    Sleep hours:        Participation in Care/Groups:  no  Medication Compliant?: selective   PRNS (last 24 hours): Sleep Aid    Restraints (last 24 hours):  no  Substance Abuse:  no  CIWA (range last 24 hours): COWS (range last 24 hours):   Alcohol screening (AUDIT) completed -  AUDIT Score: 0  If applicable, date SBIRT discussed in treatment team AND documented:   Tobacco - patient is a smoker: no   Date tobacco education completed by RN:   24 hour chart check complete: yes    Patient goal(s) for today:   Treatment team focus/goals: Continue medications  LOS:  2  Expected LOS: TBD  Financial concerns/prescription coverage:  Medicare  Date of last family contact: 4/13/17 SW spoke to daughter     Family requesting physician contact today: No  Discharge plan: Return to daughter's home     Outpatient provider(s): Dr. Michelle Gutierrez (neurologist)    Participating treatment team members: Araceli Hopper, MAKENNA Babcock; Dr. Stella Mott MD; Libra Wang RN

## 2017-04-14 NOTE — BH NOTES
1520:  Patient received as she is sitting in the Dining Room watching TV with peers. She greets oncoming staff cordially and assists another patient with reaching for crackers. She appears to be resting comfortably in a Elena chair with no signs/symptoms of pain or distress at this time. Will maintain q 15 minute safety checks. 1540:  Patient using the blue language phone with staff for communication about having her clothes washed and need to take her medication. As they are speaking, family comes to visit. Her daughter reports that the patient's memory is not that bad, but that she remains a bit paranoid. They are concerned about her taking off, running out of the house and wandering. Will continue to monitor. 1600:  Patient speaking with family members. Her daughter reports that patient does not have appropriate responses and their communication is not coordinated. Patient states that she has a special Emperor book in her heart and that only she knows what it says. She also states that her  should stay here instead of her and asks her daughter about her divorce from her  and if they are back together again. Her daughter states that she is not, and has not been, .

## 2017-04-14 NOTE — PROGRESS NOTES
Problem: Dementia-BEHAVIORAL HEALTH (Adult/Pediatric)  Goal: *STG: Remains safe in hospital  Outcome: Progressing Towards Goal  Patient remains safe in hospital.  Patient has blunted affect, smiles at times, seems confused at times, speaks Cantonese, willingly uses blue phone, patient is med and meal compliant, attended group and listened to music, out on the unit, remains on Q15 min safety checks.

## 2017-04-15 PROCEDURE — 74011250637 HC RX REV CODE- 250/637: Performed by: PSYCHIATRY & NEUROLOGY

## 2017-04-15 PROCEDURE — 65220000003 HC RM SEMIPRIVATE PSYCH

## 2017-04-15 RX ADMIN — ZOLPIDEM TARTRATE 5 MG: 5 TABLET ORAL at 21:08

## 2017-04-15 RX ADMIN — OLANZAPINE 2.5 MG: 2.5 TABLET, FILM COATED ORAL at 20:00

## 2017-04-15 RX ADMIN — CALCIUM CARBONATE 500 MG: 1250 TABLET ORAL at 09:01

## 2017-04-15 RX ADMIN — OLANZAPINE 2.5 MG: 2.5 TABLET, FILM COATED ORAL at 09:00

## 2017-04-15 RX ADMIN — MEMANTINE HYDROCHLORIDE 5 MG: 10 TABLET ORAL at 21:10

## 2017-04-15 RX ADMIN — OLANZAPINE 2.5 MG: 2.5 TABLET, FILM COATED ORAL at 16:36

## 2017-04-15 RX ADMIN — Medication 1 CAPSULE: at 09:00

## 2017-04-15 RX ADMIN — VITAMIN E CAP 400 UNIT 400 UNITS: 400 CAP at 09:02

## 2017-04-15 RX ADMIN — MEMANTINE HYDROCHLORIDE 5 MG: 10 TABLET ORAL at 09:01

## 2017-04-15 NOTE — PROGRESS NOTES
Problem: Dementia-BEHAVIORAL HEALTH (Adult/Pediatric)  Goal: *STG: Remains safe in hospital  Outcome: Progressing Towards Goal  Patient continued to remain safe in hospital.    Medications and meals compliant. Up visible on unit. Ambulated in hallway with staff this afternoon. Appeared calm & cooperative at this time. No acute distress noted. Continue pt on Q 15 min safety checks.

## 2017-04-15 NOTE — PROGRESS NOTES
Problem: Falls - Risk of  Goal: *Absence of falls  Outcome: Progressing Towards Goal  Patient is impulsive and ambulating unsteadily with staff standby assistance. She has remained free from falls/injury throughout this shift.
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
## 2017-04-15 NOTE — BH NOTES
PRN Medication Documentation    Specific patient behavior that led to need for PRN medication: paranoid,intrusive,anxiety  Staff interventions attempted prior to PRN being given: redirection  PRN medication given: zyprexa  Patient response/effectiveness of PRN medication: poor   aware

## 2017-04-15 NOTE — BH NOTES
Behavioral Health Interdisciplinary Rounds     Patient Name: Heather Hopper  Age: 66 y.o. Room/Bed:  [facility]/  Primary Diagnosis: Dementia   Admission Status: Involuntary Commitment     Readmission within 30 days: no  Power of  in place: no  Patient requires a blocked bed: yes          Reason for blocked bed: paranoid; aggressive to family    VTE Prophylaxis: Not indicated  Mobility needs/Fall risk: yes    Nutritional Plan: no  Consults:          Labs/Testing due today?: no    Sleep hours:  8+      Participation in Care/Groups:  yes  Medication Compliant?: Yes  PRNS (last 24 hours):  Antipsychotic (PO) and Sleep Aid    Restraints (last 24 hours):  no  Substance Abuse:  no  CIWA (range last 24 hours): na COWS (range last 24 hours): na  Alcohol screening (AUDIT) completed -  AUDIT Score: 0  If applicable, date SBIRT discussed in treatment team AND documented: na  Tobacco - patient is a smoker: no   Date tobacco education completed by RN: ozzie  24 hour chart check complete: yes     Patient goal(s) for today:   Treatment team focus/goals: Continue meds  LOS:  4  Expected LOS: TBD  Financial concerns/prescription coverage:  Medicare  Date of last family contact: 4/13  SW spoke to daughter      Family requesting physician contact today:  no  Discharge plan: Return to daughter's home       Outpatient provider(s): Camilo Castillo (neurologist)    Participating treatment team members: Heather Hopper,

## 2017-04-15 NOTE — BH NOTES
1535:  Patient received as she is sitting in the Dining Room watching TV with peers. She acknowledges oncoming staff but does not speak at this time. She appears to be resting comfortably in her chair at the table with no signs/symptoms of pain or distress at this time. Will maintain q 15 minute safety checks. 1640:  Patient only minimally cooperative with redirection while moving chairs in the hallway and again with taking her medications. She needs to be closely monitored to ensure compliance. She is noticed to be fiercely guarding a pocket and when staff investigates - some green Chinese pills in a blister pack are discovered. Will question family about these when they visit later this evening. 1720:  Patient pushing dinner tray away from her takes a few bites of chicken then stands over the table to spit it out. She rushes the Nurses' station attempting to grab back the green tablets taken from her earlier. Pills now locked in the med cabinet. Will continue to monitor.

## 2017-04-16 ENCOUNTER — APPOINTMENT (OUTPATIENT)
Dept: GENERAL RADIOLOGY | Age: 72
DRG: 057 | End: 2017-04-16
Attending: PSYCHIATRY & NEUROLOGY
Payer: MEDICARE

## 2017-04-16 PROCEDURE — 74011250636 HC RX REV CODE- 250/636: Performed by: PSYCHIATRY & NEUROLOGY

## 2017-04-16 PROCEDURE — 65220000003 HC RM SEMIPRIVATE PSYCH

## 2017-04-16 PROCEDURE — 74011000250 HC RX REV CODE- 250: Performed by: PSYCHIATRY & NEUROLOGY

## 2017-04-16 PROCEDURE — 74011250637 HC RX REV CODE- 250/637: Performed by: PSYCHIATRY & NEUROLOGY

## 2017-04-16 PROCEDURE — 73020 X-RAY EXAM OF SHOULDER: CPT

## 2017-04-16 PROCEDURE — 73501 X-RAY EXAM HIP UNI 1 VIEW: CPT

## 2017-04-16 RX ORDER — HALOPERIDOL 2 MG/1
2 TABLET ORAL DAILY
Status: DISCONTINUED | OUTPATIENT
Start: 2017-04-17 | End: 2017-04-18

## 2017-04-16 RX ORDER — HALOPERIDOL 2 MG/1
4 TABLET ORAL
Status: DISCONTINUED | OUTPATIENT
Start: 2017-04-16 | End: 2017-04-18

## 2017-04-16 RX ORDER — MEMANTINE HYDROCHLORIDE 10 MG/1
10 TABLET ORAL 2 TIMES DAILY
Status: DISCONTINUED | OUTPATIENT
Start: 2017-04-16 | End: 2017-04-18

## 2017-04-16 RX ADMIN — Medication 1 CAPSULE: at 08:11

## 2017-04-16 RX ADMIN — MEMANTINE HYDROCHLORIDE 5 MG: 10 TABLET ORAL at 08:11

## 2017-04-16 RX ADMIN — WATER 10 MG: 1 INJECTION INTRAMUSCULAR; INTRAVENOUS; SUBCUTANEOUS at 16:30

## 2017-04-16 RX ADMIN — VITAMIN E CAP 400 UNIT 400 UNITS: 400 CAP at 08:18

## 2017-04-16 RX ADMIN — LORAZEPAM 0.5 MG: 0.5 TABLET ORAL at 08:12

## 2017-04-16 RX ADMIN — CALCIUM CARBONATE 500 MG: 1250 TABLET ORAL at 08:10

## 2017-04-16 RX ADMIN — OLANZAPINE 2.5 MG: 2.5 TABLET, FILM COATED ORAL at 08:11

## 2017-04-16 RX ADMIN — LORAZEPAM 0.5 MG: 0.5 TABLET ORAL at 20:07

## 2017-04-16 NOTE — PROGRESS NOTES
Problem: Dementia-BEHAVIORAL HEALTH (Adult/Pediatric)  Goal: *STG/LTG: Complies with medication therapy  Outcome: Progressing Towards Goal  Patient medications and meals compliant. Up visible on unit. Ambulated in hallway, to & back between room and dayroom frequently. Appeared anxious, restless, intrusive at times, and more difficult to redirect this morning compared to yesterday. PRN ativan given as needed this morning for anxiety and agitation. No acute distress noted. Continue pt on Q 15 min safety checks.

## 2017-04-16 NOTE — PROGRESS NOTES
Problem: Falls - Risk of  Goal: *Absence of falls  Outcome: Not Progressing Towards Goal  Patient is impulsive and a determined escape risk - she is difficult to redirect and will not follow staff directions leading to an assist to the floor this evening as she attempted to exit the unit doors and lost her balance. Patient requires frequent monitoring for safety.

## 2017-04-16 NOTE — BH NOTES
PRN Medication Documentation    Specific patient behavior that led to need for PRN medication: combative,restless,pushing table,resistive to pt care  Staff interventions attempted prior to PRN being given: redirectionPRN medication given: geodon im  Patient response/effectiveness of PRN medication: good   aware of above  2030-pt continues to be resistive and restless. diffucult and unable to redirect behav.ativan given as ordered.    aware

## 2017-04-16 NOTE — INTERDISCIPLINARY ROUNDS
Behavioral Health Interdisciplinary Rounds     Patient Name: Be Hopper  Age: 66 y.o. Room/Bed:  [facility]/  Primary Diagnosis: Dementia   Admission Status: Involuntary Commitment     Readmission within 30 days: no  Power of  in place: no  Patient requires a blocked bed: yes          Reason for blocked bed: intrusive/disruptive behavior     VTE Prophylaxis: Not indicated  Mobility needs/Fall risk: yes    Nutritional Plan: no  Consults: no        Labs/Testing due today?: no    Sleep hours: 1.5       Participation in Care/Groups:  yes  Medication Compliant?: Yes  PRNS (last 24 hours):  Antipsychotic (PO) and Sleep Aid    Restraints (last 24 hours):  no  Substance Abuse:  no  CIWA (range last 24 hours):  COWS (range last 24 hours):   Alcohol screening (AUDIT) completed -  AUDIT Score: 0  If applicable, date SBIRT discussed in treatment team AND documented:   Tobacco - patient is a smoker: no   Date tobacco education completed by RN: n/a  24 hour chart check complete: yes     Patient goal(s) for today:   Treatment team focus/goals:   LOS:  5  Expected LOS:   Financial concerns/prescription coverage:    Date of last family contact:       Family requesting physician contact today:    Discharge plan:        Outpatient provider(s):     Participating treatment team members: Be Hopper,

## 2017-04-16 NOTE — BH NOTES
1253: Patient is wearing 3 layers of clothing, may be part of her culture, will ask family members. Patient refuses all help with ADLs, toileting, refused shower. 1334:  Patient fell on unit, fell onto her left shoulder and hip. Patient was holding her hip, however, denies any pain, stated \"no problem\". Patient denies hitting head. Physician notified, ordered x-rays of left shoulder and hip. Radiology called and will come up to do x-ray. Fall was unwitnessed, staff turned as patient fell, but not actually witnessed. Vital signs WDL. 1430:  Patient's son and daughter came to visit, patient is asleep in bed, family members were informed of patient's fall, they checked in on patient and did not stay.

## 2017-04-16 NOTE — BH NOTES
Post Fall Documentation      Heather Wong He witnessed fall occurred on 4/15/17 at 2100. The answers to the following questions summarize the fall:     · In the patient's own words,: Patient speaks only Cantonese. · What was he/she doing when he/she fell? Patient is behavioral, intrusive with poor boundaries and a flight risk. She was running to try the unit doors, turned and lost her balance falling into the chair arm at the door and falling to her knees - she was grabbed as she was falling and assisted to the floor by Heather Arceo. · What are his/her complaints? None    · Nurse:  · Document observation, treatment, conversation, follow-up, and patient response. Patient assisted to standing position and escorted to the [place] chair. She remains near the Nurses' station for staff observation. · What was the patient's condition when found (i.e., pain, symptoms, cuts, bruises)? Patient assisted to the floor - no injuries     · What specific complaints did the patient have? None     · What did the staff do when patient was found (i.e., vital signs, returned to bed with fall alarm, side rails up)? Patient assisted from floor to sit in the [place] chair. · Which physician was notified? Dr. Marquis Herrmann, on call, advised. Nursing Shana Erazo advised. No injuries noted - Hospitalist not called at this time.     Lauren Liriano RN
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
## 2017-04-16 NOTE — PROGRESS NOTES
Post Fall Documentation      Conner Malik He witnessed/unwitnessed fall occurred on 04/16/17 (Date) at 12 (Time). The answers to the following questions summarize the fall:     · In the patient's own words,:  · What was he/she doing when he/she fell? Patient did not give statement, however, she had just finished a telephone call, still holding phone    · What are his/her complaints? none    · Nurse:  · Document observation, treatment, conversation, follow-up, and patient response: X-rays of shoulder and hip ordered and completed, patient started to fall asleep in chair after fall (only slept 1.5 hours during night), and fell asleep in bed after x-rays completed. patient denied pain, however, was holding left hip, when asked if she hurt, patient responded, \"no problem. \"    · What was the patient's condition when found (i.e., pain, symptoms, cuts, bruises)? Staff was standing within 2 feet of patient when she fell, however did not actually see fall. Patient was helped to her feet, patient denied pain, however, was holding left hip. · What specific complaints did the patient have? none     · What did the staff do when patient was found (i.e., vital signs, returned to bed with fall alarm, side rails up)? Vital Signs were taken and were WDL, patient sat in chair within sight of staff and was walked back to her room when radiologist came to take x-rays. Patient fell asleep in bed, bed alarm on and side rail up. · Which physician was notified?  Dr. Dipti Davenport, RN

## 2017-04-16 NOTE — BH NOTES
PRN Medication Documentation    Specific patient behavior that led to need for PRN medication: agitation, difficult to redirect, grabbing another patient and will not let go even with nurse redirections.     Staff interventions attempted prior to PRN being given: calming technique, music  PRN medication given: ativan 0.5mg   Patient response/effectiveness of PRN medigiven as needed for anxiety and agitationcation: it continued listening to music in dinning room and eating breakfast.

## 2017-04-16 NOTE — BH NOTES
1520:  Patient received as she appears to be sleeping comfortably in bed. There are no signs/symptoms of pain or distress at this time. Her respirations are regular and unlabored. Will maintain q 15 minute safety checks. 1615:  Patient is defiant, refusing to follow directions, and she is attempting to rush the unit doors. She is pushing the chairs and the table, and she refuses to take her PO medication. Will continue to monitor for safety     1630:  Patient arguing and fighting with staff. She is combative and will not stay seated, has her bags packed and continues to try the doors. Security on the unit for support - IM Geodon administered. Patient seated in the [place] chair in the [address] [place] [address] for staff observation. Will continue to monitor. 2150: Although we were able to administer some PRN Ativan with ice cream earlier in the shift, patient now refuses her scheduled Haldol and spits it out into a napkin. May want to consider court-ordered medications for this patient.

## 2017-04-16 NOTE — PROGRESS NOTES
Problem: Falls - Risk of  Goal: *Absence of falls  Outcome: Not Progressing Towards Goal  2320 Pt appears asleep in recliner in dinning room. Respirations even and unlabored. In line of sight of staff for close observation. Will continue to monitor with Q 15 safety checks. 0200 Assisted pt to bathroom. Pt is calm and appropriate. Pt has on an incontinence brief, 4 pairs of underwear and 2 pairs of pants. Pt changed brief and washed face and dentures. Resting comfortably in bed, bed alarm on, night light on, door remains open.

## 2017-04-17 PROCEDURE — 74011250637 HC RX REV CODE- 250/637: Performed by: PSYCHIATRY & NEUROLOGY

## 2017-04-17 PROCEDURE — 65220000003 HC RM SEMIPRIVATE PSYCH

## 2017-04-17 PROCEDURE — 74011250636 HC RX REV CODE- 250/636: Performed by: PSYCHIATRY & NEUROLOGY

## 2017-04-17 PROCEDURE — 74011000250 HC RX REV CODE- 250: Performed by: PSYCHIATRY & NEUROLOGY

## 2017-04-17 RX ADMIN — WATER 10 MG: 1 INJECTION INTRAMUSCULAR; INTRAVENOUS; SUBCUTANEOUS at 12:55

## 2017-04-17 RX ADMIN — LORAZEPAM 0.5 MG: 2 INJECTION INTRAMUSCULAR; INTRAVENOUS at 11:58

## 2017-04-17 RX ADMIN — MEMANTINE HYDROCHLORIDE 10 MG: 10 TABLET ORAL at 22:34

## 2017-04-17 RX ADMIN — HALOPERIDOL 4 MG: 2 TABLET ORAL at 22:34

## 2017-04-17 NOTE — PROGRESS NOTES
Patient seen as courtesy consult as she is [employer]. She has not been to our office to see any physician including psychiatrist     Noted admitted with hallucination with dementia. Answer in 1 word, YES OR NO, not sure is random or not. She look well dressed and not agitate at this time, wanted to talk on  phone and want me know that she want to leave right now, trying to save two peoples. Overall she is confused about situation and insisting to go home.     Moving around ok, vital signs are ok too     Denies any pain, but avoiding any communication. Speak very limited Rafiq . Call her first emergence contact, no one  phone & answering machine is full, so can't leave message.     Please do not hesitate if need any medical consultation during hospitalization. We do have psychiatrist in clinic, so please contact us for post discharge care.     Dr. Leon Check  Cell # [phone].   Answering service # [phone]

## 2017-04-17 NOTE — BH NOTES
GROUP THERAPY PROGRESS NOTE    Sobeida Zapata He participated in a Morning Process Group on the Geriatric Unit, with a focus identifying feelings, planning for the day, and singing. Group time: 50 minutes. Personal goal for participation: To increase the capacity to shift ones mood, prepare for the day, and share in group singing. Goal orientation: The patient will be able to prepare for the day through group singing. Group therapy participation: When prompted, this patient participated in the group. Therapeutic interventions reviewed and discussed: The group members were introduce themselves by first names and participate in group   singing as a way to increase their oxygen and blood flow and begin their day on a positive note. They were also asked to join in singing several songs. Impression of participation: The patient said she planned on being picked up by her daughter and possibly leaving the hospital this morning. Her affect was euphoric and she seemed glad to be leaving the hospital. She felt strong and confident to speak in broken English to a peer and staff. She appeared fully alert and frequently smiled. There were no SI/HI or overt psychosis were noted. She used several minutes to convey her thanks   and good wishes to one of her peers on the unit as she was beginning to say her good-byes.

## 2017-04-17 NOTE — BH NOTES
At 9467 RN used blue translation phone and spoke to patient about medications. Patient refused, stating, \"I only want my green pill\". \"I will take my pills at home, when am I leaving? \" per  patient talked about her \"my Will and dying and coming back to life and the \". Charge nurse aware, patient then trying to walk out of the dining room into general nurses station with her laundry bag full of clothing. Remains escape risk at this time. PRN Medication Documentation  At 1158  Specific patient behavior that led to need for PRN medication: agitation, shaking door handles, trying to run through any open door, touching other patients, and pushing chars. Staff interventions attempted prior to PRN being given: redirection  PRN medication given: Ativan 0.5 mg IM prn, no hands on required   Patient response/effectiveness of PRN medication: medication not effective, patient intrusive with peers and combative with staff    At 1230:  Patient fell at this time. Patient intrusive with peer and would not stop either touching the peer or offering the peer her food. RN redirected patient several times, and finally assisted peer to their room to finish eating their lunch. When removing the peers tray from the table to take to peers room, this patient reached for it to grab from nurses hands and fell on right side, hip area and right arm. Head did not hit the ground and patient immediately got back up and denied pain. Patient had no apparent injury. Charge nurse, Yandel Posada, aware and witnessed this and will be informing physician of the incident.        PRN Medication Documentation  At 0189  Specific patient behavior that led to need for PRN medication: agitation, combative, posturing to hit RN trying to escape through doors  Staff interventions attempted prior to PRN being given: redirection and therapeutic communication   PRN medication given: Geodon 10 mg IM prn, with no hands on required   Patient response/effectiveness of PRN: Medication effective at 1330 patient calm, sitting in dining room.

## 2017-04-17 NOTE — BH NOTES
Behavioral Health Interdisciplinary Rounds     Patient Name: Mason Hopper  Age: 66 y.o. Room/Bed:  748/  Primary Diagnosis: Dementia   Admission Status: Involuntary Commitment     Readmission within 30 days: no  Power of  in place: no  Patient requires a blocked bed: yes          Reason for blocked bed: intrusive/disruptive behavior    VTE Prophylaxis: Not indicated  Mobility needs/Fall risk: yes    Nutritional Plan: no  Consults:          Labs/Testing due today?: no    Sleep hours:  4.5+  (broken sleep)      Participation in Care/Groups:  yes  Medication Compliant?: Refusing Psychiatric Medications and Refusing Medical Medications  PRNS (last 24 hours):  Antipsychotic (IM) and Antianxiety    Restraints (last 24 hours):  no  Substance Abuse:  no  CIWA (range last 24 hours): na COWS (range last 24 hours): na  Alcohol screening (AUDIT) completed -  AUDIT Score: 0  If applicable, date SBIRT discussed in treatment team AND documented: n/a  Tobacco - patient is a smoker: no   Date tobacco education completed by RN: ozzie  24 hour chart check complete: yes     Patient goal(s) for today:   Treatment team focus/goals: possibly get court-ordered meds for when she refuses her po meds  LOS:  6  Expected LOS: TBD  Financial concerns/prescription coverage: [employer] Medicare  Date of last family contact:  4/12/17 SW and  spoke to daughter     Family requesting physician contact today:    Discharge plan: to return home with family       Outpatient provider(s): TBD    Participating treatment team members: Mason Hopper, MAKENNA Zelaya; Dr. Lelo Curran MD; Renee Jerry RN

## 2017-04-17 NOTE — BH NOTES
At 0300, pt assisted to bathroom, but refused to use her own; she used the large bathroom to void. Then she started insisting she was going to take a shower. Informed showers were done after 0800 when the Day shift can assist those pts. Then pt started insisting she wash her hair now. Reminded of the time of day it is. Pt speaking English very well. Pt now cleaning her teeth in her bathroom. Pt has been steady on her feet. She wanted to call her  at 12, but informed she could use the phone after 0700. Found pt is wearing 3 shirts, 3 pair pants, 2 or 3 panties. Pt denies feeling chilly, needing to wear these extra layers, but refused to take the extras off. Monitoring continues. 6470  Pt has been seen turning bed alarm off when it sounds, & removes it from bed. Pt has been steady on her feet when walking this shift, & has denied dizziness. Monitoring continues. 26  Found pt coming out of another pt's room; she had taken his foam soap. Reoriented pt & redirected into Dining room to watch tv. She has packed her bag with her belongings, saying she is to go home. At present, pt calm & redirectable. Monitoring continues.

## 2017-04-17 NOTE — BH NOTES
GROUP THERAPY PROGRESS NOTE    Hyun Dutton He did not participate in a Process Group on the General Unit with a focus identifying feelings, focusing on today, and planning for discharge.

## 2017-04-17 NOTE — BH NOTES
Second Opinion (regarding): Capacity to refuse medications    Reason for Admission:  Satish Sherwood He was admitted for severe dementia, psychosis and behavioral disturbances posing a risk of harm to herself and others. Diagnosis: Dementia    The patient does not recognize or acknowledge that she has a cognitive impairment and mental illness requiring medications to stabilize her mental status. The patient does not recognize the dangers of not taking the recommended psychiatric medications. She continues to refuse medications. Determination: Based on my independent evaluation of the patient on 4/17/2017, the patient does not have the capacity to refuse the psychiatric medications currently recommended for her illness. Recommendation: Referral to [facility] for Treatment Over Objection order. The patient does not have a surrogate decision maker or guardian to make these decisions on her behalf.       Signed By:   Kateryna Espino MD  4/17/2017

## 2017-04-17 NOTE — PROGRESS NOTES
Problem: Altered Thought Process (Adult/Pediatric)  Goal: *STG: Participates in treatment plan  Outcome: Not Progressing Towards Goal  Patient understands and speaks some English, but refuses to follow directions and take PO medications earlier this evening.

## 2017-04-18 PROCEDURE — 74011250636 HC RX REV CODE- 250/636: Performed by: PSYCHIATRY & NEUROLOGY

## 2017-04-18 PROCEDURE — 65220000003 HC RM SEMIPRIVATE PSYCH

## 2017-04-18 PROCEDURE — 74011250637 HC RX REV CODE- 250/637: Performed by: PSYCHIATRY & NEUROLOGY

## 2017-04-18 PROCEDURE — 74011000250 HC RX REV CODE- 250: Performed by: PSYCHIATRY & NEUROLOGY

## 2017-04-18 RX ORDER — HALOPERIDOL 2 MG/ML
5 SOLUTION ORAL
Status: DISCONTINUED | OUTPATIENT
Start: 2017-04-18 | End: 2017-04-24

## 2017-04-18 RX ORDER — HALOPERIDOL 5 MG/ML
5 INJECTION INTRAMUSCULAR
Status: DISCONTINUED | OUTPATIENT
Start: 2017-04-18 | End: 2017-04-18

## 2017-04-18 RX ORDER — LORAZEPAM 2 MG/ML
0.5 INJECTION INTRAMUSCULAR 2 TIMES DAILY
Status: DISCONTINUED | OUTPATIENT
Start: 2017-04-18 | End: 2017-04-24 | Stop reason: HOSPADM

## 2017-04-18 RX ORDER — HALOPERIDOL 5 MG/ML
5 INJECTION INTRAMUSCULAR
Status: DISCONTINUED | OUTPATIENT
Start: 2017-04-18 | End: 2017-04-24

## 2017-04-18 RX ORDER — HALOPERIDOL 5 MG/ML
2 INJECTION INTRAMUSCULAR DAILY
Status: DISCONTINUED | OUTPATIENT
Start: 2017-04-18 | End: 2017-04-24

## 2017-04-18 RX ORDER — HALOPERIDOL 2 MG/ML
5 SOLUTION ORAL
Status: DISCONTINUED | OUTPATIENT
Start: 2017-04-18 | End: 2017-04-18

## 2017-04-18 RX ORDER — HALOPERIDOL 2 MG/ML
2 SOLUTION ORAL DAILY
Status: DISCONTINUED | OUTPATIENT
Start: 2017-04-18 | End: 2017-04-24

## 2017-04-18 RX ORDER — MEMANTINE HYDROCHLORIDE 10 MG/1
10 TABLET ORAL 2 TIMES DAILY
Status: DISCONTINUED | OUTPATIENT
Start: 2017-04-18 | End: 2017-04-24 | Stop reason: HOSPADM

## 2017-04-18 RX ADMIN — WATER 10 MG: 1 INJECTION INTRAMUSCULAR; INTRAVENOUS; SUBCUTANEOUS at 08:19

## 2017-04-18 RX ADMIN — HALOPERIDOL LACTATE 5 MG: 2 SOLUTION, CONCENTRATE ORAL at 21:59

## 2017-04-18 RX ADMIN — LORAZEPAM 0.5 MG: 2 INJECTION, SOLUTION INTRAMUSCULAR; INTRAVENOUS at 14:47

## 2017-04-18 RX ADMIN — MEMANTINE HYDROCHLORIDE 10 MG: 10 TABLET ORAL at 21:59

## 2017-04-18 RX ADMIN — HALOPERIDOL LACTATE 2 MG: 5 INJECTION, SOLUTION INTRAMUSCULAR at 14:44

## 2017-04-18 NOTE — BH NOTES
Pt is labile, restless. Moves furniture, intrusive, unsteady. Asks to go home. Refuses medication. Good appetite. Pt's daughter, Marichuy Garcia, notified of forced med hearing tomorrow. Pt fell forward while walking. Caught by staff and lowered to ground.

## 2017-04-18 NOTE — BH NOTES
GROUP THERAPY PROGRESS NOTE    Beryle End He did not participate in a Process Group on the General Unit with a focus identifying feelings, planning for the day, and learning more about DBT concepts on \"Emotion Regulation. \"

## 2017-04-18 NOTE — PROGRESS NOTES
Problem: Falls - Risk of  Goal: *Absence of falls  Outcome: Progressing Towards Goal  Sleeping in chair in dining area , respirations even and easy   Continue to monitor as line of sight for safety .   Continue AWOL precautions

## 2017-04-18 NOTE — PROGRESS NOTES
Problem: Falls - Risk of  Goal: *Absence of falls  Outcome: Progressing Towards Goal  Patient is absent of falls, anxious, restless, mood is labile, affect congruent. Patient is uncooperative at times, confused, meal compliant, afternoon medication compliant, refused help with ADL's, remains on Q15 min safety checks.

## 2017-04-18 NOTE — BH NOTES
GROUP THERAPY PROGRESS NOTE    Bridget Thompson He did not participate in a Process Group on the Geriatric Unit, with a focus on identifying feelings, planning for the day, and singing. The group was not held because there were not enough patients available at the same time to hold this group in the morning or afternoon. She was seen briefly individually. Her responses did not indicate either SI/HI or overt psychotic symptoms. She was not as depressed as  she was last week. She brightened considerably when members of her family came for a visit.

## 2017-04-18 NOTE — BH NOTES
PRN Medication Documentation  At 1288  Specific patient behavior that led to need for PRN medication: patient shaking door handles, pushing chairs, trying to push chair out of the way when RN assisting another pt into it. Which could cause the other pt to fall. Patient intrusive and touching other peer. Staff interventions attempted prior to PRN being given: re-direction  PRN medication given: Geodon 10 mg IM prn, with no hands on  Patient response/effectiveness of PRN medication: medication effective at 0945, patient sitting in chair, resting with eyes closed, quietly.      Patient had hearing for forced meds this am.

## 2017-04-18 NOTE — PROGRESS NOTES
Problem: Falls - Risk of  Goal: *Absence of falls  Outcome: Progressing Towards Goal  Staff have attempted to educate Pt on safe practices and fall prevention. At times Pt walks around with her eyes closed and leaning forward unsteadily. Pt has limited English but she does open her eyes when reminded. She requires close monitoring by staff presently but has remained fall free today.

## 2017-04-18 NOTE — INTERDISCIPLINARY ROUNDS
Behavioral Health Interdisciplinary Rounds     Patient Name: Mason Hopper  Age: 66 y.o. Room/Bed:  [facility]/  Primary Diagnosis: Dementia   Admission Status: Involuntary Commitment     Readmission within 30 days: no  Power of  in place: no  Patient requires a blocked bed: yes          Reason for blocked bed: intrusive/disruptive    VTE Prophylaxis: Not indicated  Mobility needs/Fall risk: yes    Nutritional Plan: no  Consults: no         Labs/Testing due today?: no    Sleep hours:        Participation in Care/Groups:  yes  Medication Compliant?: Selective  PRNS (last 24 hours):  Antipsychotic (IM) and Antianxiety    Restraints (last 24 hours):  no  Substance Abuse:  no  CIWA (range last 24 hours):  COWS (range last 24 hours):   Alcohol screening (AUDIT) completed -  AUDIT Score: 0  If applicable, date SBIRT discussed in treatment team AND documented: n/a  Tobacco - patient is a smoker: no   Date tobacco education completed by RN: n/a  24 hour chart check complete: yes     Patient goal(s) for today:   Treatment team focus/goals:   LOS:  7  Expected LOS:   Financial concerns/prescription coverage:    Date of last family contact:       Family requesting physician contact today:    Discharge plan:        Outpatient provider(s):     Participating treatment team members: Mason Hopper,
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
## 2017-04-19 PROCEDURE — 74011250637 HC RX REV CODE- 250/637: Performed by: PSYCHIATRY & NEUROLOGY

## 2017-04-19 PROCEDURE — 65220000003 HC RM SEMIPRIVATE PSYCH

## 2017-04-19 PROCEDURE — 74011250636 HC RX REV CODE- 250/636: Performed by: PSYCHIATRY & NEUROLOGY

## 2017-04-19 RX ADMIN — HALOPERIDOL LACTATE 2 MG: 2 SOLUTION, CONCENTRATE ORAL at 08:24

## 2017-04-19 RX ADMIN — LORAZEPAM 0.5 MG: 2 INJECTION INTRAMUSCULAR; INTRAVENOUS at 20:40

## 2017-04-19 RX ADMIN — HALOPERIDOL LACTATE 5 MG: 5 INJECTION, SOLUTION INTRAMUSCULAR at 20:31

## 2017-04-19 RX ADMIN — CALCIUM CARBONATE 500 MG: 1250 TABLET ORAL at 08:25

## 2017-04-19 RX ADMIN — Medication 1 CAPSULE: at 08:24

## 2017-04-19 RX ADMIN — MEMANTINE HYDROCHLORIDE 10 MG: 10 TABLET ORAL at 08:25

## 2017-04-19 RX ADMIN — VITAMIN E CAP 400 UNIT 400 UNITS: 400 CAP at 08:25

## 2017-04-19 NOTE — PROGRESS NOTES
Problem: Falls - Risk of  Goal: *Absence of falls  Outcome: Progressing Towards Goal  2315 Pt appears asleep in bed. Respirations even and unlabored. Bed alarm on, call bell within reach, door remains open. Will continue to monitor with Q 15 safety checks.
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
## 2017-04-19 NOTE — PROGRESS NOTES
Problem: Altered Thought Process (Adult/Pediatric)  Goal: *STG: Remains safe in hospital  Outcome: Progressing Towards Goal  Patient remains safe in hospital.  Patient is smiling, calm, cooperative, med and meal compliant. Patient has not tried to leave unit, less impulsive, less intrusive currently resting quietly in bed. Remains on Q15 min safety checks.

## 2017-04-19 NOTE — BH NOTES
GROUP THERAPY PROGRESS NOTE    Emily Wagner He did not participate in a Morning Process Group on the Geriatric Unit with a focus shifting ones feelings to a positive note and preparing for the day through group singing.

## 2017-04-19 NOTE — PROGRESS NOTES
Patient seen as courtesy consult as she is [employer]. She has not been to our office to see any physician including psychiatrist    C/O urine frequency  Noted she is now forced meds, sleeping this morning  Wanted to leave hospital all the time       Noted admitted with hallucination with dementia. Answer in 1 word, YES OR NO, not sure is random or not. She look well dressed and not agitate at this time, wanted to talk on  phone and want me know that she want to leave right now, trying to save two peoples. Overall she is confused about situation and insisting to go home.      Denies any pain, but avoiding any communication. Speak very limited [address] Call her first emergence contact, no one  phone & answering machine is full, so can't leave message. Check UA and culture    Discussed with unit nurse and , and treating physician will call me back later today, to facilitate discharge process. We can help to have her seen by psychiatrist after discharge.      Please do not hesitate if need any medical consultation during hospitalization. We do have psychiatrist in clinic, so please contact us for post discharge care.      Dr. Dejan Jones  Cell # [phone].

## 2017-04-19 NOTE — INTERDISCIPLINARY ROUNDS
Behavioral Health Interdisciplinary Rounds     Patient Name: Naif Hopper  Age: 66 y.o. Room/Bed:  748/  Primary Diagnosis: Dementia   Admission Status: Involuntary Commitment and Forced Medication Order     Readmission within 30 days: no  Power of  in place: no  Patient requires a blocked bed: yes          Reason for blocked bed: intrusive/disruptive     VTE Prophylaxis: Not indicated  Mobility needs/Fall risk: yes    Nutritional Plan: no  Consults: no         Labs/Testing due today?: no    Sleep hours:        Participation in Care/Groups:  yes  Medication Compliant?: Selective  PRNS (last 24 hours):  Antipsychotic (IM)    Restraints (last 24 hours):  no  Substance Abuse:  no  CIWA (range last 24 hours):  COWS (range last 24 hours):   Alcohol screening (AUDIT) completed -  AUDIT Score: 0  If applicable, date SBIRT discussed in treatment team AND documented:   Tobacco - patient is a smoker: no   Date tobacco education completed by RN: n/a  24 hour chart check complete: yes     Patient goal(s) for today:   Treatment team focus/goals: Call Catherine Chawla doctor/  LOS:  8  Expected LOS: TBD  Financial concerns/prescription coverage: [employer]  Date of last family contact: 4/14/17 SW spoke to daughter      Family requesting physician contact today: No  Discharge plan: Return home       Outpatient provider(s): Dr. Kate Gaspar (neurology); Dr. Laverne Munoz (PCP)    Participating treatment team members: Naif Hopper, Starla Sanchez MSW; Dr. Pedro Pablo Gaitan MD; Lisa Gutierrez, AIDEE; Eber Banda, TrevaD
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
## 2017-04-19 NOTE — BH NOTES
Pt took court ordered medications after much encouragement. She then attempted to climb out of bed. Writer assisted Pt back to bed.

## 2017-04-20 LAB
APPEARANCE UR: ABNORMAL
BACTERIA URNS QL MICRO: ABNORMAL /HPF
BILIRUB UR QL: NEGATIVE
COLOR UR: ABNORMAL
EPITH CASTS URNS QL MICRO: ABNORMAL /LPF
GLUCOSE UR STRIP.AUTO-MCNC: NEGATIVE MG/DL
HGB UR QL STRIP: ABNORMAL
KETONES UR QL STRIP.AUTO: NEGATIVE MG/DL
LEUKOCYTE ESTERASE UR QL STRIP.AUTO: ABNORMAL
NITRITE UR QL STRIP.AUTO: NEGATIVE
PH UR STRIP: 6.5 [PH] (ref 5–8)
PROT UR STRIP-MCNC: NEGATIVE MG/DL
RBC #/AREA URNS HPF: ABNORMAL /HPF (ref 0–5)
SP GR UR REFRACTOMETRY: 1.01 (ref 1–1.03)
UROBILINOGEN UR QL STRIP.AUTO: 0.2 EU/DL (ref 0.2–1)
WBC URNS QL MICRO: ABNORMAL /HPF (ref 0–4)

## 2017-04-20 PROCEDURE — 74011250637 HC RX REV CODE- 250/637: Performed by: PSYCHIATRY & NEUROLOGY

## 2017-04-20 PROCEDURE — 81001 URINALYSIS AUTO W/SCOPE: CPT | Performed by: PSYCHIATRY & NEUROLOGY

## 2017-04-20 PROCEDURE — 65220000003 HC RM SEMIPRIVATE PSYCH

## 2017-04-20 PROCEDURE — 74011250637 HC RX REV CODE- 250/637: Performed by: INTERNAL MEDICINE

## 2017-04-20 RX ORDER — AMOXICILLIN AND CLAVULANATE POTASSIUM 875; 125 MG/1; MG/1
1 TABLET, FILM COATED ORAL EVERY 12 HOURS
Status: DISCONTINUED | OUTPATIENT
Start: 2017-04-20 | End: 2017-04-24 | Stop reason: HOSPADM

## 2017-04-20 RX ADMIN — AMOXICILLIN AND CLAVULANATE POTASSIUM 1 TABLET: 875; 125 TABLET, FILM COATED ORAL at 22:05

## 2017-04-20 RX ADMIN — Medication 1 CAPSULE: at 10:09

## 2017-04-20 RX ADMIN — MEMANTINE HYDROCHLORIDE 10 MG: 10 TABLET ORAL at 22:05

## 2017-04-20 RX ADMIN — HALOPERIDOL LACTATE 5 MG: 2 SOLUTION, CONCENTRATE ORAL at 22:05

## 2017-04-20 RX ADMIN — CALCIUM CARBONATE 500 MG: 1250 TABLET ORAL at 10:09

## 2017-04-20 RX ADMIN — AMOXICILLIN AND CLAVULANATE POTASSIUM 1 TABLET: 875; 125 TABLET, FILM COATED ORAL at 10:35

## 2017-04-20 RX ADMIN — MEMANTINE HYDROCHLORIDE 10 MG: 10 TABLET ORAL at 10:09

## 2017-04-20 RX ADMIN — VITAMIN E CAP 400 UNIT 400 UNITS: 400 CAP at 10:09

## 2017-04-20 RX ADMIN — HALOPERIDOL LACTATE 2 MG: 2 SOLUTION, CONCENTRATE ORAL at 10:10

## 2017-04-20 RX ADMIN — LORAZEPAM 0.5 MG: 0.5 TABLET ORAL at 15:58
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
## 2017-04-20 NOTE — INTERDISCIPLINARY ROUNDS
Behavioral Health Interdisciplinary Rounds     Patient Name: Jose M Hopper  Age: 66 y.o. Room/Bed:  [facility]/  Primary Diagnosis: Dementia   Admission Status: Involuntary Commitment and Forced Medication Order     Readmission within 30 days: no  Power of  in place: no  Patient requires a blocked bed: yes          Reason for blocked bed: intrusive/disruptive     VTE Prophylaxis: Not indicated  Mobility needs/Fall risk: yes    Nutritional Plan: no  Consults: no         Labs/Testing due today?: yes: UA/Urine culture: collected     Sleep hours: 5.25       Participation in Care/Groups:  some  Medication Compliant?: Selective  PRNS (last 24 hours): None: (received scheduled IMs for refusing PO)     Restraints (last 24 hours):  no  Substance Abuse:  no  CIWA (range last 24 hours):  COWS (range last 24 hours):   Alcohol screening (AUDIT) completed -  AUDIT Score: 0  If applicable, date SBIRT discussed in treatment team AND documented: N/A  Tobacco - patient is a smoker: no   Date tobacco education completed by RN: n/a  24 hour chart check complete: yes     Patient goal(s) for today:   Treatment team focus/goals: Continue forced meds  LOS:  9  Expected LOS: TBD  Psychiatric Advanced Care Directives - no  Name of Decision maker if patient has Psychiatric Care Directive: None   Patient was offered information: patient declined  Financial concerns/prescription coverage: [facility] Medicare  Date of last family contact: 4/19/17 Dr. Faustina Pitts left voicemail for daughter    Family requesting physician contact today: No  Discharge plan: Return home       Outpatient provider(s): Dr. Jeni Dawson (PCP);  Dr. Yayo Mccall (neurologist)    Participating treatment team members: Candy Wilkins MSW; Dr. Kamran Nails MD; Sam Barajas, AIDEE; Seb Josue, TrevaD; Dr. Faustina Pitts MD

## 2017-04-20 NOTE — BH NOTES
8065 Pt appears asleep in recliner in dinning room in line of site of nurse's station. Respirations even and unlabored. NAD noted. Will continue to monitor with Q 15 safety checks.

## 2017-04-20 NOTE — BH NOTES
GROUP THERAPY PROGRESS NOTE    Monse Fish He participated in a Morning Process Group on the Geriatric Unit, with a focus identifying feelings, planning for the day, and singing. Group time: 50 minutes. Personal goal for participation: To increase the capacity to shift ones mood, prepare for the day, and share in group singing. Goal orientation: The patient will be able to prepare for the day through group singing. Group therapy participation: When prompted, this patient participated in the group. Therapeutic interventions reviewed and discussed: The group members were introduce themselves by first names and participate in group   singing as a way to increase their oxygen and blood flow and begin their day on a positive note. They were also asked to join in singing several songs. Impression of participation: The patient said she wanted to go home. She looked tired and at one point during group put her hands over her face,  as if to shut out the sensory input she was receiving from her environment. There were no SI/HI or overt psychosis. It is unclear if she was  responding to internal stimuli. Her affect has significantly changed from earlier in the week when she was euphoric and smiling to today, in which  she exhibited anger, frustration, and tiredness.

## 2017-04-20 NOTE — PROGRESS NOTES
As per nurses last night some what aggressive. Received schedule haldol last night. At this timing calm and walking around, denies any pain. State \"I want to go home\". Noted UA was positive  I do not think UTI is culprit for her agitation or confusion, because UA was negative on presentation. Treating UTI will help on her urine frequency symptoms, I do not think it will help any on her agitation/confusion. It probably related with dementia with behavior disturbances. I will discuss with psychiatrist and we can arrange post discharge follow up with our psychiatrist at clinic. Left a message on contact number to discuss with family    Dr. Alison Cazares Physician  Cell 3 [phone].

## 2017-04-20 NOTE — PROGRESS NOTES
Noted UA is positive for UTI  Waiting for culture    She swallow ok as per nurses, lots of time she refuse meds.     Start Augmentin 875/175 mg PO BID( like to avoid flouroquinolones because of agitation) if she refuse to take tabs might need ceftriaxone IM    Dr. Teddy Holcomb [facility] physician  Cell # [phone]
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
## 2017-04-20 NOTE — BH NOTES
PRN Medication Documentation    Specific patient behavior that led to need for PRN medication: crying,pulling on family,anxiety,restless  Staff interventions attempted prior to PRN being given:redirection  PRN medication given:ativan  Patient response/effectiveness of PRN medication: good

## 2017-04-20 NOTE — PROGRESS NOTES
Problem: Dementia-BEHAVIORAL HEALTH (Adult/Pediatric)  Goal: *STG: Remains safe in hospital  Outcome: Progressing Towards Goal  Received this morning restin/sleeping in bed. Awakens alert. Seems less restless this morning,not pacing the halls and taking direction better. Has been medication and meal compliant. Declined breakfast,but ate lunch. Sat at nurses station coloring a good portion of the day. Has been incontinent,allowing staff to assist her. Staff to continue to monitor patient for safety and attempt to re-assure patient on the unit.  MRM/RN

## 2017-04-21 LAB
ANION GAP BLD CALC-SCNC: 7 MMOL/L (ref 5–15)
BUN SERPL-MCNC: 12 MG/DL (ref 6–20)
BUN/CREAT SERPL: 13 (ref 12–20)
CALCIUM SERPL-MCNC: 9.4 MG/DL (ref 8.5–10.1)
CHLORIDE SERPL-SCNC: 106 MMOL/L (ref 97–108)
CO2 SERPL-SCNC: 26 MMOL/L (ref 21–32)
CREAT SERPL-MCNC: 0.96 MG/DL (ref 0.55–1.02)
GLUCOSE SERPL-MCNC: 93 MG/DL (ref 65–100)
POTASSIUM SERPL-SCNC: 4.3 MMOL/L (ref 3.5–5.1)
SODIUM SERPL-SCNC: 139 MMOL/L (ref 136–145)

## 2017-04-21 PROCEDURE — 74011250637 HC RX REV CODE- 250/637: Performed by: INTERNAL MEDICINE

## 2017-04-21 PROCEDURE — 65220000003 HC RM SEMIPRIVATE PSYCH

## 2017-04-21 PROCEDURE — 80048 BASIC METABOLIC PNL TOTAL CA: CPT | Performed by: PSYCHIATRY & NEUROLOGY

## 2017-04-21 PROCEDURE — 74011250637 HC RX REV CODE- 250/637: Performed by: PSYCHIATRY & NEUROLOGY

## 2017-04-21 PROCEDURE — 36415 COLL VENOUS BLD VENIPUNCTURE: CPT | Performed by: PSYCHIATRY & NEUROLOGY

## 2017-04-21 RX ADMIN — LORAZEPAM 0.5 MG: 0.5 TABLET ORAL at 16:14

## 2017-04-21 RX ADMIN — HALOPERIDOL LACTATE 5 MG: 2 SOLUTION, CONCENTRATE ORAL at 20:45

## 2017-04-21 RX ADMIN — Medication 1 CAPSULE: at 09:07

## 2017-04-21 RX ADMIN — AMOXICILLIN AND CLAVULANATE POTASSIUM 1 TABLET: 875; 125 TABLET, FILM COATED ORAL at 09:07

## 2017-04-21 RX ADMIN — MEMANTINE HYDROCHLORIDE 10 MG: 10 TABLET ORAL at 20:45

## 2017-04-21 RX ADMIN — AMOXICILLIN AND CLAVULANATE POTASSIUM 1 TABLET: 875; 125 TABLET, FILM COATED ORAL at 20:45

## 2017-04-21 RX ADMIN — CALCIUM CARBONATE 500 MG: 1250 TABLET ORAL at 09:07

## 2017-04-21 RX ADMIN — HALOPERIDOL LACTATE 2 MG: 2 SOLUTION, CONCENTRATE ORAL at 09:06

## 2017-04-21 RX ADMIN — MEMANTINE HYDROCHLORIDE 10 MG: 10 TABLET ORAL at 09:07

## 2017-04-21 RX ADMIN — VITAMIN E CAP 400 UNIT 400 UNITS: 400 CAP at 09:15

## 2017-04-21 NOTE — INTERDISCIPLINARY ROUNDS
Behavioral Health Interdisciplinary Rounds     Patient Name: Riki Hopper  Age: 66 y.o. Room/Bed:  748/  Primary Diagnosis: Dementia   Admission Status: Involuntary Commitment and Forced Medication Order     Readmission within 30 days: no  Power of  in place: no  Patient requires a blocked bed: yes          Reason for blocked bed: intrusive/disruptive     VTE Prophylaxis: Not indicated  Mobility needs/Fall risk: yes    Nutritional Plan: no  Consults: no         Labs/Testing due today?: yes: bmp - collected      Sleep hours: 9 +       Participation in Care/Groups:  yes  Medication Compliant?: Yes  PRNS (last 24 hours): Antianxiety    Restraints (last 24 hours):  no  Substance Abuse:  no  CIWA (range last 24 hours):  COWS (range last 24 hours):   Alcohol screening (AUDIT) completed -  AUDIT Score: 0  If applicable, date SBIRT discussed in treatment team AND documented: N/A  Tobacco - patient is a smoker: no   Date tobacco education completed by RN: n/a  24 hour chart check complete: yes     Patient goal(s) for today:   Treatment team focus/goals: Continue stabilization through medications  LOS:  10  Expected LOS: TBD  Psychiatric [address] -  No  Name of Decision maker if patient has Psychiatric Care Directive: None  Patient was offered information, patient refused information. Financial concerns/prescription coverage: Medicare   Date of last family contact: 4/20/17 Family visited     Family requesting physician contact today: No  Discharge plan: Return home       Outpatient provider(s): Dr. Juan A Sauer (PCP);  Dr. Rhonda Delgadillo (neurologist)    Participating treatment team members: Riki Hopper, MAKENNA Lara; Dr. Main Edmond MD; Morgan Anne RN

## 2017-04-21 NOTE — PROGRESS NOTES
As per nurses last night needed PRN lorazepam, but overall better and taking her meds. Less aggressive and not agitated.      Noted UA was positive  I do not think UTI is culprit for her agitation or confusion, because UA was negative on presentation. Treating UTI will help on her urine frequency symptoms, I do not think it will help any on her agitation/confusion. It probably related with dementia with behavior disturbances. Completed Augmentin for 10 days    I discussed with psychiatrist that we are arranging home visit with our psych provider and might be another physician too, probably next Friday afternoon    I think if continue to get better might can go home soon,  psychiatrist suggested that she will be here over the weekend and discharge home on Monday     My colleague Dr. Reid Russell had spoken to family regarding overall concern for dementia and some paranoid behavior. Patient will need 24/7 supervision. Family understand that.     If need any assistance over the week end please call Hospitalist.     Dr. Shayy Akhtar More Physician  Answering service # [phone]

## 2017-04-21 NOTE — BH NOTES
GROUP THERAPY PROGRESS NOTE    Richard Sanchez He did not participate in a Morning Process Group on the Geriatric Unit, with a focus on identifying feelings and planning. The group session was not held in the morning because there were not two patients on this unit who were available and could tolerate group this morning. In the afternoon, there was a similar lack of available patients and floor buffing taking place in the group area.

## 2017-04-21 NOTE — BH NOTES
PRN Medication Documentation    Specific patient behavior that led to need for PRN medication: anxiety,restless,posturing    Staff interventions attempted prior to PRN being given: emotional support,redirection  PRN medication given: ativan  Patient response/effectiveness of PRN medication: good
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
## 2017-04-21 NOTE — PROGRESS NOTES
Problem: Altered Thought Process (Adult/Pediatric)  Goal: *STG: Complies with medication therapy  Outcome: Progressing Towards Goal  Patient is medication complaint this morning. Patient visible in the unit, restless. Preoccupied. . Patient needs encouragement to eat breakfast meal. NAD. Q-15 checks for safety. Goal: Interventions  Outcome: Progressing Towards Goal  Staff encourage pt to ask for help when needed. Medication education.

## 2017-04-21 NOTE — BH NOTES
2315 Pt appears asleep in bed. Respirations even and unlabored. Bed alarm on, call bell within reach, door remains open. Will continue to monitor with Q 15 safety checks.

## 2017-04-21 NOTE — PROGRESS NOTES
Problem: Dementia-BEHAVIORAL HEALTH (Adult/Pediatric)  Goal: *STG: Participates in treatment plan  Outcome: Progressing Towards Goal  Pt is smiling, cooperative, labile, redirectable. Pt has been speaking some English with staff, expresses needs and responds appropriately. Pt has been pleasant toward peers. Appears calm, dresses and bathes self with stand by assistance. Occasionally suspicious of staff, preoccupied with medications and belongings. Does not appear to be responding to internal stimuli. Med and meal compliant. Will continue to monitor and encourage.

## 2017-04-21 NOTE — PROGRESS NOTES
Problem: Altered Thought Process (Adult/Pediatric)  Goal: *STG: Remains safe in hospital  Patient is sitting quietly in the dinning room. Alert, calm and cooperative. Pleasant affect.   Patient remains safe in hospital.

## 2017-04-22 PROCEDURE — 74011250637 HC RX REV CODE- 250/637: Performed by: INTERNAL MEDICINE

## 2017-04-22 PROCEDURE — 74011250637 HC RX REV CODE- 250/637: Performed by: PSYCHIATRY & NEUROLOGY

## 2017-04-22 PROCEDURE — 65220000003 HC RM SEMIPRIVATE PSYCH

## 2017-04-22 RX ORDER — LANOLIN ALCOHOL/MO/W.PET/CERES
400 CREAM (GRAM) TOPICAL DAILY
Status: DISCONTINUED | OUTPATIENT
Start: 2017-04-22 | End: 2017-04-22 | Stop reason: CLARIF

## 2017-04-22 RX ADMIN — HALOPERIDOL LACTATE 5 MG: 2 SOLUTION, CONCENTRATE ORAL at 22:04

## 2017-04-22 RX ADMIN — HALOPERIDOL LACTATE 2 MG: 2 SOLUTION, CONCENTRATE ORAL at 09:47

## 2017-04-22 RX ADMIN — AMOXICILLIN AND CLAVULANATE POTASSIUM 1 TABLET: 875; 125 TABLET, FILM COATED ORAL at 22:04

## 2017-04-22 RX ADMIN — MEMANTINE HYDROCHLORIDE 10 MG: 10 TABLET ORAL at 22:04

## 2017-04-22 RX ADMIN — CALCIUM CARBONATE 500 MG: 1250 TABLET ORAL at 09:49

## 2017-04-22 RX ADMIN — AMOXICILLIN AND CLAVULANATE POTASSIUM 1 TABLET: 875; 125 TABLET, FILM COATED ORAL at 09:48

## 2017-04-22 RX ADMIN — MEMANTINE HYDROCHLORIDE 10 MG: 10 TABLET ORAL at 09:46

## 2017-04-22 RX ADMIN — Medication 1 CAPSULE: at 09:50

## 2017-04-22 RX ADMIN — VITAMIN E CAP 400 UNIT 400 UNITS: 400 CAP at 09:45

## 2017-04-22 NOTE — INTERDISCIPLINARY ROUNDS
Behavioral Health Interdisciplinary Rounds     Patient Name: Hyun Hopper  Age: 66 y.o. Room/Bed:  [facility]  Primary Diagnosis: Dementia   Admission Status: Involuntary Commitment and Forced Medication Order     Readmission within 30 days: no  Power of  in place: no  Patient requires a blocked bed: yes          Reason for blocked bed: intrusive , disruptive     VTE Prophylaxis: Not indicated  Mobility needs/Fall risk: yes    Nutritional Plan: no  Consults:        Labs/Testing due today?: no    Sleep hours: 4 1/2 hours       Participation in Care/Groups:  no  Medication Compliant?: Yes  PRNS (last 24 hours):  Antianxiety    Restraints (last 24 hours):  no  Substance Abuse:  no  CIWA (range last 24 hours):  COWS (range last 24 hours): Alcohol screening (AUDIT) completed -  AUDIT Score: 0  If applicable, date SBIRT discussed in treatment team AND documented:   Tobacco - patient is a smoker: no    Date tobacco education completed by RN:   24 hour chart check complete: yes     Patient goal(s) for today:   Treatment team focus/goals:   LOS:  11  Expected LOS:   [address] -  no   Name of Decision maker if patient has Psychiatric Care Directive  Patient was offered information   Financial concerns/prescription coverage:    Date of last family contact:      Family requesting physician contact today:    Discharge plan:       Outpatient provider(s):     Participating treatment team members: Hyun Hopper,

## 2017-04-22 NOTE — PROGRESS NOTES
Problem: Dementia-BEHAVIORAL HEALTH (Adult/Pediatric)  Goal: *STG: Participates in treatment plan  Outcome: Progressing Towards Goal  Pt focused on going home. Needs redirection with being intrusive with staff and patients. Eating well.  PLeasant, med compliant
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
## 2017-04-22 NOTE — BH NOTES
0215  Pt came to station; she said she wanted to call her . Informed of the present time, & that phones would be available at 0700, & breakfast before 0800. Pt acknowledged this, then returned to room. No distress noted. Monitoring continues.

## 2017-04-23 PROCEDURE — 74011250637 HC RX REV CODE- 250/637: Performed by: PSYCHIATRY & NEUROLOGY

## 2017-04-23 PROCEDURE — 74011250637 HC RX REV CODE- 250/637: Performed by: INTERNAL MEDICINE

## 2017-04-23 PROCEDURE — 65220000003 HC RM SEMIPRIVATE PSYCH

## 2017-04-23 RX ADMIN — Medication 1 CAPSULE: at 09:56

## 2017-04-23 RX ADMIN — VITAMIN E CAP 400 UNIT 400 UNITS: 400 CAP at 09:51

## 2017-04-23 RX ADMIN — LORAZEPAM 0.5 MG: 0.5 TABLET ORAL at 12:20

## 2017-04-23 RX ADMIN — HALOPERIDOL LACTATE 5 MG: 2 SOLUTION, CONCENTRATE ORAL at 22:24

## 2017-04-23 RX ADMIN — HALOPERIDOL LACTATE 2 MG: 2 SOLUTION, CONCENTRATE ORAL at 09:50

## 2017-04-23 RX ADMIN — AMOXICILLIN AND CLAVULANATE POTASSIUM 1 TABLET: 875; 125 TABLET, FILM COATED ORAL at 22:24

## 2017-04-23 RX ADMIN — AMOXICILLIN AND CLAVULANATE POTASSIUM 1 TABLET: 875; 125 TABLET, FILM COATED ORAL at 09:52

## 2017-04-23 RX ADMIN — MEMANTINE HYDROCHLORIDE 10 MG: 10 TABLET ORAL at 09:46

## 2017-04-23 RX ADMIN — MEMANTINE HYDROCHLORIDE 10 MG: 10 TABLET ORAL at 22:24

## 2017-04-23 RX ADMIN — CALCIUM CARBONATE 500 MG: 1250 TABLET ORAL at 09:55

## 2017-04-23 NOTE — BH NOTES
PRN Medication Documentation    Specific patient behavior that led to need for PRN medication: anxious, restless, attempted to push red button on wall in room  Staff interventions attempted prior to PRN being given: redirections, distractions, music    PRN medication given: ativan 0.5 mg  Patient response/effectiveness of PRN medication: continue to monitor & support. Pt ambulated in hallway with staff currently.
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
## 2017-04-23 NOTE — PROGRESS NOTES
Problem: Dementia-BEHAVIORAL HEALTH (Adult/Pediatric)  Goal: *STG: Participates in treatment plan  Outcome: Progressing Towards Goal  Pt is smiling, pleasant, calm and cooperative, more isolative this evening. Med and meal compliant. Pt exercised in room this evening and went to bed.

## 2017-04-23 NOTE — BH NOTES
Behavioral Health Interdisciplinary Rounds     Patient Name: Emily Bernard He  Age: 66 y.o.   Room/Bed:  [facility]  Primary Diagnosis: Dementia   Admission Status: Involuntary Commitment     Readmission within 30 days: no  Power of  in place: yes  Patient requires a blocked bed: yes          Reason for blocked bed: intrusive, disruptive behavior    VTE Prophylaxis: Not indicated  Mobility needs/Fall risk: yes    Nutritional Plan: no  Consults:          Labs/Testing due today?: no    Sleep hours:  7.5      Participation in Care/Groups:  no   Medication Compliant?: Yes  PRNS (last 24 hours): None    Restraints (last 24 hours):  no  Substance Abuse:  no  CIWA (range last 24 hours):  COWS (range last 24 hours):   Alcohol screening (AUDIT) completed -  AUDIT Score: 0  If applicable, date SBIRT discussed in treatment team AND documented:   Tobacco - patient is a smoker: no   Date tobacco education completed by RN: n/a  24 hour chart check complete: yes     Patient goal(s) for today:   Treatment team focus/goals:   LOS:  12  Expected LOS:   Psychiatric Advanced Care Directives -     Name of Decision maker if patient has Psychiatric Care Directive   Patient was offered information   Financial concerns/prescription coverage:    Date of last family contact:       Family requesting physician contact today:    Discharge plan:        Outpatient provider(s):     Participating treatment team members: Emily Hopper,

## 2017-04-23 NOTE — PROGRESS NOTES
Problem: Falls - Risk of  Goal: *Absence of falls  Outcome: Progressing Towards Goal  Patient has not experienced any falls . Wears gripper socks. Problem: Dementia-BEHAVIORAL HEALTH (Adult/Pediatric)  Goal: *STG/LTG: Complies with medication therapy  Outcome: Progressing Towards Goal  Patient has been medication and meal compliant on this shift with much encouragement. Alert. Pleasantly confused. Ate meals in dining room with peers. Visible in DR/DI most of shift. Walks aimlessly in hallway. Attempts to walk into peers bedroom and requires redirections. Patient required PRN Ativan 0.5 mg tab. At 1220 for anxiety with good results.

## 2017-04-24 VITALS
OXYGEN SATURATION: 99 % | RESPIRATION RATE: 16 BRPM | BODY MASS INDEX: 22.97 KG/M2 | DIASTOLIC BLOOD PRESSURE: 79 MMHG | TEMPERATURE: 97.9 F | SYSTOLIC BLOOD PRESSURE: 125 MMHG | HEIGHT: 60 IN | HEART RATE: 104 BPM | WEIGHT: 117 LBS

## 2017-04-24 PROCEDURE — 74011250637 HC RX REV CODE- 250/637: Performed by: PSYCHIATRY & NEUROLOGY

## 2017-04-24 PROCEDURE — 74011250637 HC RX REV CODE- 250/637: Performed by: INTERNAL MEDICINE

## 2017-04-24 RX ORDER — HALOPERIDOL 2 MG/1
2 TABLET ORAL
Qty: 30 TAB | Refills: 0 | Status: SHIPPED | OUTPATIENT
Start: 2017-04-24 | End: 2017-06-13

## 2017-04-24 RX ORDER — HALOPERIDOL 5 MG/1
5 TABLET ORAL
Qty: 30 TAB | Refills: 0 | Status: SHIPPED | OUTPATIENT
Start: 2017-04-24 | End: 2017-06-13

## 2017-04-24 RX ORDER — HALOPERIDOL 2 MG/ML
7 SOLUTION ORAL
Status: DISCONTINUED | OUTPATIENT
Start: 2017-04-24 | End: 2017-04-24 | Stop reason: HOSPADM

## 2017-04-24 RX ORDER — AMOXICILLIN AND CLAVULANATE POTASSIUM 875; 125 MG/1; MG/1
1 TABLET, FILM COATED ORAL EVERY 12 HOURS
Qty: 11 TAB | Refills: 0 | Status: SHIPPED | OUTPATIENT
Start: 2017-04-24 | End: 2017-04-29

## 2017-04-24 RX ORDER — HALOPERIDOL 5 MG/ML
5 INJECTION INTRAMUSCULAR
Status: DISCONTINUED | OUTPATIENT
Start: 2017-04-24 | End: 2017-04-24 | Stop reason: HOSPADM

## 2017-04-24 RX ORDER — MEMANTINE HYDROCHLORIDE 10 MG/1
10 TABLET ORAL 2 TIMES DAILY
Qty: 60 TAB | Refills: 0 | Status: SHIPPED | OUTPATIENT
Start: 2017-04-24 | End: 2017-07-12

## 2017-04-24 RX ADMIN — MEMANTINE HYDROCHLORIDE 10 MG: 10 TABLET ORAL at 10:11

## 2017-04-24 RX ADMIN — VITAMIN E CAP 400 UNIT 400 UNITS: 400 CAP at 10:12

## 2017-04-24 RX ADMIN — Medication 1 CAPSULE: at 10:11

## 2017-04-24 RX ADMIN — CALCIUM CARBONATE 500 MG: 1250 TABLET ORAL at 10:11

## 2017-04-24 RX ADMIN — AMOXICILLIN AND CLAVULANATE POTASSIUM 1 TABLET: 875; 125 TABLET, FILM COATED ORAL at 10:11

## 2017-04-24 NOTE — INTERDISCIPLINARY ROUNDS
Behavioral Health Interdisciplinary Rounds     Patient Name: Araceli Hopper  Age: 66 y.o. Room/Bed:  [facility]  Primary Diagnosis: Dementia   Admission Status: Involuntary Commitment and Forced Medication Order     Readmission within 30 days: no  Power of  in place: yes  Patient requires a blocked bed: yes          Reason for blocked bed: intrusive/disruptive     VTE Prophylaxis: Not indicated  Mobility needs/Fall risk: yes    Nutritional Plan: no  Consults: no         Labs/Testing due today?: no    Sleep hours: 8.5        Participation in Care/Groups:  yes  Medication Compliant?: Yes  PRNS (last 24 hours):  Antianxiety    Restraints (last 24 hours):  no  Substance Abuse:  no  CIWA (range last 24 hours):  COWS (range last 24 hours):   Alcohol screening (AUDIT) completed -  AUDIT Score: 0  If applicable, date SBIRT discussed in treatment team AND documented:   Tobacco - patient is a smoker: no   Date tobacco education completed by RN: n/a  24 hour chart check complete: yes     Patient goal(s) for today:   Treatment team focus/goals:   LOS:  13  Expected LOS:   Psychiatric [address] -    Name of Decision maker if patient has Psychiatric Care Directive   Patient was offered information   Financial concerns/prescription coverage:    Date of last family contact:       Family requesting physician contact today:   Discharge plan:        Outpatient provider(s):     Participating treatment team members: Araceli Hopper,

## 2017-04-24 NOTE — PROGRESS NOTES
Overall doing much better, easily redirectable and taking her meds. No complains    Noted UA was positive  I do not think UTI is culprit for her agitation or confusion, because UA was negative on presentation. Treating UTI will help on her urine frequency symptoms, I do not think it will help any on her agitation/confusion. It probably related with dementia with behavior disturbances. Completed Augmentin for 10 days( wrote a prescription for 6 more days)     I discussed with psychiatrist that we are arranging home visit with our psych provider and might be another physician too, probably this Friday afternoon     I think if continue to get better might can go home soon, possible discharge home today  Discuss with  here.  Mainor Guzman  My colleague Dr. Roche Salem had spoken to family regarding overall concern for dementia and some paranoid behavior. Patient will need 24/7 supervision.  Family understand that.     If need any assistance over the week end please call Margot Pulliam More Physician  Answering service # [phone]

## 2017-04-24 NOTE — BH NOTES
2315 Pt appears asleep in bed. Respirations even and unlabored. NAD noted. Bathroom light on, call bell within reach, door remains open. Will continue to monitor with Q 15 safety checks.

## 2017-04-24 NOTE — BH NOTES
Patient is alert, calm and cooperative. Family present. Reviewed discharge instructions with family. Patient received all scripts and personal belongings. Family sighed discharge documentation.

## 2017-04-24 NOTE — BH NOTES
GROUP THERAPY PROGRESS NOTE    Heather Judith He participated in a Morning Process Group on the Geriatric Unit, with a focus identifying feelings, planning for the day, and singing. Group time: 30 minutes. Personal goal for participation: To increase the capacity to shift ones mood, prepare for the day, and share in group singing. Goal orientation: The patient will be able to prepare for the day through group singing. Group therapy participation: When prompted, this patient partially participated in the group. Therapeutic interventions reviewed and discussed: The group members were introduce themselves by first names and participate in group singing as a way to increase their oxygen and blood flow and begin their day on a positive note. They were also asked to join in singing several songs. Impression of participation: The patient was focused on completing a puzzle that she had started but responded when prompted by her peers or staff. Her mood was calm and slightly euphoric. She smiled and answered with brief English phrases. No SI/HI or overt psychosis were noted. She said she was feeling \"fine. \" She was alert and appeared oriented. She did not appear to be responding to any internal stimuli.

## 2017-04-24 NOTE — BH NOTES
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   Component Value Date/Time    Glucose 93 2017 04:39 AM    Glucose 87 2017 06:14 AM        No results found for: HBA1C, HGBE8, [id number]     Lipid Panel  Lab Results   Component Value Date/Time    Cholesterol, total 136 2017 06:14 AM    HDL Cholesterol 68 2017 06:14 AM    LDL, calculated 46.6 2017 06:14 AM    Triglyceride 107 2017 06:14 AM    CHOL/HDL Ratio 2.0 2017 06:14 AM       Discharge Diagnosis: Dementia (ICD-10-CM: F03.90; ICD-9-CM: 294.20)    Discharge Plan:   DISCHARGE SUMMARY    NAME:Bethel Hopper  : 1945  MRN: [id number]    The patient Irma Noriega He exhibits the ability to control behavior in a less restrictive environment. Patient's level of functioning is improving. No assaultive/destructive behavior has been observed for the past 24 hours. No suicidal/homicidal threat or behavior has been observed for the past 24 hours. There is no evidence of serious medication side effects. Patient has not been in physical or protective restraints for at least the past 24 hours. If weapons involved, how are they secured? No weapons involved.     Is patient aware of and in agreement with discharge plan? Patient and family are aware and in agreement with discharge plan. Arrangements for medication:  Prescriptions given to patient. Patient is a smoker and needs referral for smoking cessation? Patient is not a smoker. 1.  Patient referred to the following for smoking cessation with an appointment:   2. Patient was offered medication to assist with smoking cessation at discharge:   3.  Education for smoking cessation added to discharge instructions:     Patient has a history of substance/alcohol abuse and requires a referral for treatment? No  1. Patient referred to the following for substance/alcohol abuse treatment with an appointment:    2. Patient was offered medication to assist with alcohol cessation at discharge:    3.  Education for substance/alcohol abuse added to discharge instructions:     Copy of discharge instructions to provider?:  Yes, Dr. Aristides Daniels and Simeon Zaman NP ([phone])    Arrangements for transportation home: Family to   [address] [place] [address] St- no  Name of Decision maker if patient has Psychiatric Care Directive: None  Patient was offered information, patient declined information. Keep all follow up appointments as scheduled, continue to take prescribed medications per physician instructions. Mental health crisis number:  382 or your local mental health crisis line number at [phone]    Discharge Medication List and Instructions:   Current Discharge Medication List      START taking these medications    Details   amoxicillin-clavulanate (AUGMENTIN) 875-125 mg per tablet Take 1 Tab by mouth every twelve (12) hours for 5 days. Indications: BACTERIAL URINARY TRACT INFECTION  Qty: 11 Tab, Refills: 0      memantine (NAMENDA) 10 mg tablet Take 1 Tab by mouth two (2) times a day.  Indications: MODERATE TO SEVERE ALZHEIMER'S TYPE DEMENTIA  Qty: 60 Tab, Refills: 0      !! haloperidol (HALDOL) 2 mg tablet Take 1 Tab by mouth nightly. Indications: PSYCHOTIC DISORDER  Qty: 30 Tab, Refills: 0      !! haloperidol (HALDOL) 5 mg tablet Take 1 Tab by mouth nightly. Indications: PSYCHOTIC DISORDER  Qty: 30 Tab, Refills: 0       !! - Potential duplicate medications found. Please discuss with provider. CONTINUE these medications which have NOT CHANGED    Details   fish oil-omega-3 fatty acids 340-1,000 mg capsule Take 1 Cap by mouth daily. Indications: SUPPLEMENT      VITAMIN E, DL,TOCOPHERYL ACET, (VITAMIN E ACETATE) 400 unit cap capsule Take 400 Units by mouth daily. Indications: SUPPLEMENT      calcium 500 mg Tab Take 500 mg by mouth daily. Indications: HYPOCALCEMIA PREVENTION         STOP taking these medications       OLANZapine (ZYPREXA) 2.5 mg tablet Comments:   Reason for Stopping:               Unresulted Labs     None        To obtain results of studies pending at discharge, please contact N/A    Follow-up Information     Follow up With Details Mark Couch and Dr. Parrish Pineda On 4/28/2017 Doctors to visit Patient in the home. [facility], [address]  [phone]    [facility], NP On 4/28/2017 Psychiatric nurse practitioner will visit Patient in the home. [address] [facility], [address] MD   [address] [place] [address], NP On 5/1/2017 You have a 9:20am appointment at the neurology office. Please arrive by 9:00am to complete paperwork. Nurse Zachary Steele works with Dr. Adair Beltrán. [facility]  [facility]   [place], [address]  [phone]          Advanced Care Plan:   Confirm Advance Directive: None     Patient Would Like to Complete Advance Directive: Yes        Does the patient have other document types: Other (comment)    Patient Instructions: Please continue all medications until otherwise directed by physician.    What to do at Home:  Recommended activity: Activity as tolerated,     If you experience any of the following symptoms increased agitation and/or altered thought process, please follow up with 911 [phone] . *  Please give a list of your current medications to your Primary Care Provider. *  Please update this list whenever your medications are discontinued, doses are      changed, or new medications (including over-the-counter products) are added. *  Please carry medication information at all times in case of emergency situations. Patient discharged to Home; discussed with patient/caregiver, provided to the patient/caregiver either in hard copy or electronically. and patient refused hard copy.

## 2017-04-24 NOTE — BH NOTES
Patient cheeked namenda and then when RN informed patient that she needed to swallow, she smiled and then swallowed it. When given Haldol liquid scheduled she put in mouth and then refused to swallow and then spit it out on the floor. Charge nurse aware.

## 2017-04-24 NOTE — PROGRESS NOTES
Problem: Dementia-BEHAVIORAL HEALTH (Adult/Pediatric)  Goal: *STG: Participates in treatment plan  Outcome: Not Progressing Towards Goal  Patient cheeked meds this am and spat the liquid meds out. Patient ambulating unsteady. Patient possible discharge today.

## 2017-04-24 NOTE — PROGRESS NOTES
Problem: Dementia-BEHAVIORAL HEALTH (Adult/Pediatric)  Goal: *STG: Participates in treatment plan  Outcome: Progressing Towards Goal  Pt was calm, bright, smiling, social on unit in early evening, isolative after dinner. Retired to room after meal and slept most of evening. No evidence of hallucinations or delusions. Med compliant.

## 2017-04-24 NOTE — PROGRESS NOTES
Post Fall Documentation      Conner Malik He witnessed/unwitnessed fall occurred on 4/17/17 (Date) at 200 (Time). The answers to the following questions summarize the fall:     · In the patient's own words,:  · What was he/she doing when he/she fell? Reaching for a peers food tray from the hands of staff. Overreached and fell on right buttocks/hip. · What are his/her complaints? None voiced. · Nurse:  · Document observation, treatment, conversation, follow-up, and patient response. Pt continued to appear confused and difficulty following staff direction. Continues to reach for peers food tray while sitting at table    · What was the patient's condition when found (i.e., pain, symptoms, cuts, bruises)? Does not demonstrate pain or discomfort. Able to stand from sitting postion    · What specific complaints did the patient have? none     · What did the staff do when patient was found (i.e., vital signs, returned to bed with fall alarm, side rails up)? Pt found on floor sitting on buttocks. Stood and sat in chair with assist x 1 staff. Declined vitals and use of  phone. · Which physician was notified? DR. Mckeon notified     Yamilet Kwon, RN

## 2017-04-24 NOTE — DISCHARGE INSTRUCTIONS
DISCHARGE SUMMARY    NAME:Bethel Hopper  : 1945  MRN: [id number]    The patient Irma Noriega He exhibits the ability to control behavior in a less restrictive environment. Patient's level of functioning is improving. No assaultive/destructive behavior has been observed for the past 24 hours. No suicidal/homicidal threat or behavior has been observed for the past 24 hours. There is no evidence of serious medication side effects. Patient has not been in physical or protective restraints for at least the past 24 hours. If weapons involved, how are they secured? No weapons involved. Is patient aware of and in agreement with discharge plan? Patient and family are aware and in agreement with discharge plan. Arrangements for medication:  Prescriptions given to patient. Patient is a smoker and needs referral for smoking cessation? Patient is not a smoker. 1.  Patient referred to the following for smoking cessation with an appointment:   2. Patient was offered medication to assist with smoking cessation at discharge:   3.  Education for smoking cessation added to discharge instructions:     Patient has a history of substance/alcohol abuse and requires a referral for treatment? No  1. Patient referred to the following for substance/alcohol abuse treatment with an appointment:    2. Patient was offered medication to assist with alcohol cessation at discharge:    3.  Education for substance/alcohol abuse added to discharge instructions:     Copy of discharge instructions to provider?:  Yes, Dr. Dejan Jones and Joelle Perez NP ([phone])    Arrangements for transportation home: Family to   [address]- no  Name of Decision maker if patient has Psychiatric Care Directive: None  Patient was offered information, patient declined information. Keep all follow up appointments as scheduled, continue to take prescribed medications per physician instructions.   Mental health crisis number:  [phone] or your local mental health crisis line number at [facility] 9469 from Nurse    The following personal items are in your possession at time of discharge:    Dental Appliances: At bedside, Lowers, Uppers, With patient  Visual Aid: None     Home Medications: None  Jewelry: None  Clothing: At bedside, Footwear, Pants, Shirt, Socks, Undergarments, With patient  Other Valuables: None  Personal Items Sent to Safe: non          PATIENT INSTRUCTIONS:    What to do at Home:  Recommended activity: Activity as tolerated,     If you experience any of the following symptoms increased agitation and/or altered thought process, please follow up with 911 [phone] . *  Please give a list of your current medications to your Primary Care Provider. *  Please update this list whenever your medications are discontinued, doses are      changed, or new medications (including over-the-counter products) are added. *  Please carry medication information at all times in case of emergency situations. These are general instructions for a healthy lifestyle:    No smoking/ No tobacco products/ Avoid exposure to second hand smoke    Surgeon General's Warning:  Quitting smoking now greatly reduces serious risk to your health. Obesity, smoking, and sedentary lifestyle greatly increases your risk for illness    A healthy diet, regular physical exercise & weight monitoring are important for maintaining a healthy lifestyle    You may be retaining fluid if you have a history of heart failure or if you experience any of the following symptoms:  Weight gain of 3 pounds or more overnight or 5 pounds in a week, increased swelling in our hands or feet or shortness of breath while lying flat in bed. Please call your doctor as soon as you notice any of these symptoms; do not wait until your next office visit.     Recognize signs and symptoms of STROKE:    F-face looks uneven    A-arms unable to move or move unevenly    S-speech slurred or non-existent    T-time-call 911 as soon as signs and symptoms begin-DO NOT go       Back to bed or wait to see if you get better-TIME IS BRAIN. Warning Signs of HEART ATTACK     Call 911 if you have these symptoms:   Chest discomfort. Most heart attacks involve discomfort in the center of the chest that lasts more than a few minutes, or that goes away and comes back. It can feel like uncomfortable pressure, squeezing, fullness, or pain.  Discomfort in other areas of the upper body. Symptoms can include pain or discomfort in one or both arms, the back, neck, jaw, or stomach.  Shortness of breath with or without chest discomfort.  Other signs may include breaking out in a cold sweat, nausea, or lightheadedness. Don't wait more than five minutes to call 911 - MINUTES MATTER! Fast action can save your life. Calling 911 is almost always the fastest way to get lifesaving treatment. Emergency Medical Services staff can begin treatment when they arrive -- up to an hour sooner than if someone gets to the hospital by car. The discharge information has been reviewed with the patient. The patient verbalized understanding. Discharge medications reviewed with the patient and appropriate educational materials and side effects teaching were provided.

## 2017-04-24 NOTE — DISCHARGE SUMMARY
PSYCHIATRIC DISCHARGE SUMMARY         IDENTIFICATION:    Patient Name  Naif Hopper   Date of Birth 1945   [facility] [id number]   Medical Record Number  [id number]      Age  66 y.o. PCP Laverne Munoz MD   Admit date:  4/11/2017    Discharge date: 4/24/2017   Room Number  748/01  @ [address] [facility]   Date of Service  4/24/2017               TYPE OF DISCHARGE: REGULAR               CONDITION AT DISCHARGE: good and improved       PROVISIONAL & DISCHARGE DIAGNOSES:    Problem List  Date Reviewed: 4/12/2017          Codes Class    * (Principal)Dementia ICD-10-CM: F03.90  ICD-9-CM: 294.20         Osteopenia ICD-10-CM: M85.80  ICD-9-CM: 733.90         History of bladder infections ICD-10-CM: Z87.440  ICD-9-CM: V13.02         Cataract ICD-10-CM: H26.9  ICD-9-CM: 366.9               Active Hospital Problems    *Dementia        DISCHARGE DIAGNOSIS:   Axis I:  SEE ABOVE  Axis II: SEE ABOVE  Axis III: SEE ABOVE  Axis IV:  lack of structure  Axis V:  50 on admission, 60 on discharge 60(baseline)       CC & HISTORY OF PRESENT ILLNESS:  66year old  female admitted on TDO for wandering, aggression and poor self care in the setting of worsening dementia. Pt also had paranoid delusional beliefs. The patient was committed and placed on forced medications. She improved with Haldol and Namenda. She also made improvements as a result of augmentin treatment for a UTI. At discharge she denied SI/HI , intent or plan and did not meet TDO criteria. SOCIAL HISTORY:    Social History     Social History    Marital status:      Spouse name: N/A    Number of children: N/A    Years of education: N/A     Occupational History    Not on file.      Social History Main Topics    Smoking status: Never Smoker    Smokeless tobacco: Not on file    Alcohol use No    Drug use: No    Sexual activity: Not on file     Other Topics Concern    Not on file     Social History Narrative    66 year vold  female admitted voluntarily for dementia. Pt lives with her daughter, who reports pt. Has been more confused, wandering and occasionally aggressive in the last 3 weeks. Her internist just changed the pt from haldol to zyprexa because the pt complained of sedation. Pt is to return home to her daughter after treatment. She is able to speak and understand limited [place]. FAMILY HISTORY:   History reviewed. No pertinent family history. HOSPITALIZATION COURSE:    Hyun Hopper was admitted to the inpatient psychiatric unit [facility] for acute psychiatric stabilization in regards to symptomatology as described in the HPI above. The differential diagnosis at time of admission included: dementia, psychosis. While on the unit Hyun Dutton He was involved in individual, group, occupational and milieu therapy. Psychiatric medications were adjusted during this hospitalization including haldol. Hyun Dutton He demonstrated a slow, but progressive improvement in overall condition. Much of patient's depression appeared to be related to situational stressors and psychological factors. Please see individual progress notes for more specific details regarding patient's hospitalization course. At time of dc, Hyun Dutton He was without significant problems with depression psychosis  nasra. Overall presentation at time of discharge is most consistent with the diagnosis of dementia with psychosis. Patient with request for discharge today. There are no grounds to seek a TDO. Patient has maximized benefit to be derived from acute inpatient psychiatric treatment.   All members of the treatment team concur with each other in regards to plans for discharge today per patient's request.          LABS AND IMAGAING:    Labs Reviewed   METABOLIC PANEL, COMPREHENSIVE - Abnormal; Notable for the following:        Result Value    Potassium 3.3 (*)     Glucose 110 (*)     GFR est non-AA 57 (*)     Albumin 3.4 (*)     A-G Ratio 0.9 (*)     All other components within normal limits   URINALYSIS W/ RFLX MICROSCOPIC - Abnormal; Notable for the following:     Blood TRACE (*)     Leukocyte Esterase TRACE (*)     CA Oxalate crystals 3+ (*)     All other components within normal limits   SALICYLATE - Abnormal; Notable for the following:     SALICYLATE <1.5 (*)     All other components within normal limits   ACETAMINOPHEN - Abnormal; Notable for the following:     ACETAMINOPHEN <2 (*)     All other components within normal limits   URINALYSIS W/MICROSCOPIC - Abnormal; Notable for the following:     Appearance CLOUDY (*)     Blood SMALL (*)     Leukocyte Esterase LARGE (*)     Bacteria 1+ (*)     All other components within normal limits   METABOLIC PANEL, BASIC - Abnormal; Notable for the following:     GFR est non-AA 57 (*)     All other components within normal limits   CBC WITH AUTOMATED DIFF   DRUG SCREEN, URINE   ETHYL ALCOHOL   GLUCOSE, FASTING   TSH 3RD GENERATION   LIPID PANEL     Admission on 04/11/2017   Component Date Value Ref Range Status    WBC 04/11/2017 6.2  3.6 - 11.0 K/uL Final    RBC 04/11/2017 3.99  3.80 - 5.20 M/uL Final    HGB 04/11/2017 12.2  11.5 - 16.0 g/dL Final    HCT 04/11/2017 36.1  35.0 - 47.0 % Final    MCV 04/11/2017 90.5  80.0 - 99.0 FL Final    MCH 04/11/2017 30.6  26.0 - 34.0 PG Final    MCHC 04/11/2017 33.8  30.0 - 36.5 g/dL Final    RDW 04/11/2017 12.7  11.5 - 14.5 % Final    PLATELET 94/27/6635 385  150 - 400 K/uL Final    NEUTROPHILS 04/11/2017 69  32 - 75 % Final    LYMPHOCYTES 04/11/2017 21  12 - 49 % Final    MONOCYTES 04/11/2017 8  5 - 13 % Final    EOSINOPHILS 04/11/2017 2  0 - 7 % Final    BASOPHILS 04/11/2017 0  0 - 1 % Final    ABS. NEUTROPHILS 04/11/2017 4.3  1.8 - 8.0 K/UL Final    ABS. LYMPHOCYTES 04/11/2017 1.3  0.8 - 3.5 K/UL Final    ABS. MONOCYTES 04/11/2017 0.5  0.0 - 1.0 K/UL Final    ABS. EOSINOPHILS 04/11/2017 0.1  0.0 - 0.4 K/UL Final    ABS.  BASOPHILS 04/11/2017 0.0  0.0 - 0.1 K/UL Final    Sodium 04/11/2017 141  136 - 145 mmol/L Final    Potassium 04/11/2017 3.3* 3.5 - 5.1 mmol/L Final    Chloride 04/11/2017 106  97 - 108 mmol/L Final    CO2 04/11/2017 27  21 - 32 mmol/L Final    Anion gap 04/11/2017 8  5 - 15 mmol/L Final    Glucose 04/11/2017 110* 65 - 100 mg/dL Final    BUN 04/11/2017 15  6 - 20 MG/DL Final    Creatinine 04/11/2017 0.96  0.55 - 1.02 MG/DL Final    BUN/Creatinine ratio 04/11/2017 16  12 - 20   Final    GFR est AA 04/11/2017 >60  >60 ml/min/1.73m2 Final    GFR est non-AA 04/11/2017 57* >60 ml/min/1.73m2 Final    Calcium 04/11/2017 9.6  8.5 - 10.1 MG/DL Final    Bilirubin, total 04/11/2017 0.6  0.2 - 1.0 MG/DL Final    ALT (SGPT) 04/11/2017 23  12 - 78 U/L Final    AST (SGOT) 04/11/2017 16  15 - 37 U/L Final    Alk.  phosphatase 04/11/2017 62  45 - 117 U/L Final    Protein, total 04/11/2017 7.2  6.4 - 8.2 g/dL Final    Albumin 04/11/2017 3.4* 3.5 - 5.0 g/dL Final    Globulin 04/11/2017 3.8  2.0 - 4.0 g/dL Final    A-G Ratio 04/11/2017 0.9* 1.1 - 2.2   Final    Color 04/11/2017 YELLOW/STRAW    Final    Appearance 04/11/2017 CLEAR  CLEAR   Final    Specific gravity 04/11/2017 1.012  1.003 - 1.030   Final    pH (UA) 04/11/2017 6.5  5.0 - 8.0   Final    Protein 04/11/2017 NEGATIVE   NEG mg/dL Final    Glucose 04/11/2017 NEGATIVE   NEG mg/dL Final    Ketone 04/11/2017 NEGATIVE   NEG mg/dL Final    Bilirubin 04/11/2017 NEGATIVE   NEG   Final    Blood 04/11/2017 TRACE* NEG   Final    Urobilinogen 04/11/2017 0.2  0.2 - 1.0 EU/dL Final    Nitrites 04/11/2017 NEGATIVE   NEG   Final    Leukocyte Esterase 04/11/2017 TRACE* NEG   Final    WBC 04/11/2017 0-4  0 - 4 /hpf Final    RBC 04/11/2017 0-5  0 - 5 /hpf Final    Epithelial cells 04/11/2017 FEW  FEW /lpf Final    Bacteria 04/11/2017 NEGATIVE   NEG /hpf Final    CA Oxalate crystals 04/11/2017 3+* NEG Final    AMPHETAMINE 04/11/2017 NEGATIVE   NEG   Final    BARBITURATES 04/11/2017 NEGATIVE NEG   Final    BENZODIAZEPINE 04/11/2017 NEGATIVE   NEG   Final    COCAINE 04/11/2017 NEGATIVE   NEG   Final    METHADONE 04/11/2017 NEGATIVE   NEG   Final    OPIATES 04/11/2017 NEGATIVE   NEG   Final    PCP(PHENCYCLIDINE) 04/11/2017 NEGATIVE   NEG   Final    THC (TH-CANNABINOL) 04/11/2017 NEGATIVE   NEG   Final    Drug screen comment 04/11/2017 (NOTE)   Final    ALCOHOL(ETHYL),SERUM 04/11/2017 <10  <10 MG/DL Final    SALICYLATE 23/62/9040 <3.4* 2.8 - 20.0 MG/DL Final    ACETAMINOPHEN 04/11/2017 <2* 10 - 30 ug/mL Final    Ventricular Rate 04/11/2017 87  BPM Final    Atrial Rate 04/11/2017 87  BPM Final    P-R Interval 04/11/2017 208  ms Final    QRS Duration 04/11/2017 78  ms Final    Q-T Interval 04/11/2017 370  ms Final    QTC Calculation (Bezet) 04/11/2017 445  ms Final    Calculated P Axis 04/11/2017 66  degrees Final    Calculated R Axis 04/11/2017 28  degrees Final    Calculated T Axis 04/11/2017 47  degrees Final    Diagnosis 04/11/2017    Final                    Value:Normal sinus rhythm  No previous ECGs available  Confirmed by Gerardo Kennedy M.D., Harrington Heady ([id number]) on 4/11/2017 5:28:14 PM      Ventricular Rate 04/11/2017 83  BPM Final    Atrial Rate 04/11/2017 83  BPM Final    P-R Interval 04/11/2017 204  ms Final    QRS Duration 04/11/2017 82  ms Final    Q-T Interval 04/11/2017 368  ms Final    QTC Calculation (Bezet) 04/11/2017 432  ms Final    Calculated P Axis 04/11/2017 51  degrees Final    Calculated R Axis 04/11/2017 23  degrees Final    Calculated T Axis 04/11/2017 42  degrees Final    Diagnosis 04/11/2017    Final                    Value:Normal sinus rhythm  No previous ECGs available  Confirmed by Gerardo Kennedy M.D., [place] Heady ([id number]) on 4/11/2017 5:27:52 PM      Glucose 04/12/2017 87  65 - 100 MG/DL Final    TSH 04/12/2017 0.67  0.36 - 3.74 uIU/mL Final    LIPID PROFILE 04/12/2017        Final    Cholesterol, total 04/12/2017 136  <200 MG/DL Final    Triglyceride 04/12/2017 107 <150 MG/DL Final    HDL Cholesterol 04/12/2017 68  MG/DL Final    LDL, calculated 04/12/2017 46.6  0 - 100 MG/DL Final    VLDL, calculated 04/12/2017 21.4  MG/DL Final    CHOL/HDL Ratio 04/12/2017 2.0  0 - 5.0   Final    Color 04/20/2017 YELLOW/STRAW    Final    Appearance 04/20/2017 CLOUDY* CLEAR   Final    Specific gravity 04/20/2017 1.009  1.003 - 1.030   Final    pH (UA) 04/20/2017 6.5  5.0 - 8.0   Final    Protein 04/20/2017 NEGATIVE   NEG mg/dL Final    Glucose 04/20/2017 NEGATIVE   NEG mg/dL Final    Ketone 04/20/2017 NEGATIVE   NEG mg/dL Final    Bilirubin 04/20/2017 NEGATIVE   NEG   Final    Blood 04/20/2017 SMALL* NEG   Final    Urobilinogen 04/20/2017 0.2  0.2 - 1.0 EU/dL Final    Nitrites 04/20/2017 NEGATIVE   NEG   Final    Leukocyte Esterase 04/20/2017 LARGE* NEG   Final    WBC 04/20/2017 10-20  0 - 4 /hpf Final    RBC 04/20/2017 0-5  0 - 5 /hpf Final    Epithelial cells 04/20/2017 FEW  FEW /lpf Final    Bacteria 04/20/2017 1+* NEG /hpf Final    Sodium 04/21/2017 139  136 - 145 mmol/L Final    Potassium 04/21/2017 4.3  3.5 - 5.1 mmol/L Final    Chloride 04/21/2017 106  97 - 108 mmol/L Final    CO2 04/21/2017 26  21 - 32 mmol/L Final    Anion gap 04/21/2017 7  5 - 15 mmol/L Final    Glucose 04/21/2017 93  65 - 100 mg/dL Final    BUN 04/21/2017 12  6 - 20 MG/DL Final    Creatinine 04/21/2017 0.96  0.55 - 1.02 MG/DL Final    BUN/Creatinine ratio 04/21/2017 13  12 - 20   Final    GFR est AA 04/21/2017 >60  >60 ml/min/1.73m2 Final    GFR est non-AA 04/21/2017 57* >60 ml/min/1.73m2 Final    Calcium 04/21/2017 9.4  8.5 - 10.1 MG/DL Final     Ct Head Wo Cont    Result Date: 4/11/2017  INDICATION: altered mental status. Dementia, hallucinations. Exam: Noncontrast CT of the brain is performed with 5 mm collimation. CT dose reduction was achieved with the use of the standardized protocol tailored for this examination and automatic exposure control for dose modulation.  Adaptive statistical iterative reconstruction (ASIR) was utilized. Direct comparison is made to prior CT dated April 2017. FINDINGS: There is mild diffuse cortical atrophy. There is no acute intracranial hemorrhage, mass, mass effect or herniation. Ventricular system is normal. The gray-white matter differentiation is well-preserved. The mastoid air cells are well pneumatized. The visualized paranasal sinuses are normal.     IMPRESSION: No acute intracranial hemorrhage, mass or infarct. Ct Head Wo Cont    Result Date: 4/5/2017  EXAM:  CT HEAD WO CONT INDICATION:   AMS, delerium x2-3 weeks. r/o subdural COMPARISON: None. TECHNIQUE: Unenhanced CT of the head was performed using 5 mm images. Brain and bone windows were generated. CT dose reduction was achieved through use of a standardized protocol tailored for this examination and automatic exposure control for dose modulation. Adaptive statistical iterative reconstruction (ASIR) was utilized. FINDINGS: The ventricles and sulci are mildly prominent in size with otherwise normal shape and configuration and are midline. There is mild periventricular white matter hypodensity. There is no intracranial hemorrhage, extra-axial collection, mass, mass effect or midline shift. The basilar cisterns are open. No acute infarct is identified. The bone windows demonstrate no abnormalities. The visualized portions of the paranasal sinuses and mastoid air cells are clear. IMPRESSION: No acute findings. Mild atrophy and nonspecific white matter changes most commonly seen with chronic small vessel ischemic and/or senescent change. Xr Shoulder Lt 1 V    Result Date: 4/16/2017  EXAM:  XR SHOULDER LT 1 V INDICATION:   fall on left shoulder. COMPARISON: None. FINDINGS: A single view of the left shoulder demonstrates no fracture, dislocation or other acute abnormality. IMPRESSION:  No acute abnormality.      Xr Hip Yuko Cat Or Wo Pelv  1 Vw    Result Date: 4/16/2017  EXAM:  XR HIP LT W OR WO PELV  1 VW INDICATION:   fall on lt hip. COMPARISON: None. FINDINGS: A single view of the left hip demonstrates no fracture, dislocation or other acute abnormality. IMPRESSION:  No acute abnormality. DISPOSITION:    Home. Patient to f/u with o/p psychiatric, and psychotherapy appointments. Patient is to f/u with internist as directed. FOLLOW-UP CARE:    Activity as tolerated  Regular Diet  Wound Care: none needed. Follow-up Information     Follow up With Details David Castorena and Dr. Brandi Mcdermott On 4/28/2017 Doctors to visit Patient in the home. [facility], [address]  [phone]    Mohsen Garcia NP On 4/28/2017 Psychiatric nurse practitioner will visit Patient in the home. [address], [address] [facility] [place]   [address] [place] [address] MD   [address], NP On 5/1/2017 You have a 9:20am appointment at the neurology office. Please arrive by 9:00am to complete paperwork. Nurse Jaxon Schmitz works with Dr. Trevin Lara. [facility]  [address]   [place], [address]  [phone]                 PROGNOSIS:  Greatly dependent upon patient's ability to f/u with o/p psychiatric/psychotherapy appointments as well as to comply with psychiatric medications as prescribed. Patient denies suicidal or homicidal ideations. Danuta Hopper fully contracts for safety. Patient reports many positive predictive factors in terms of not attempting suicide or homicide. Patient appears to be at low risk of suicide or homicide. Patient and family are aware and in agreement with discharge and discharge plan. DISCHARGE MEDICATIONS: (no changes made).     Informed consent given for the use of following psychotropic medications:  Current Discharge Medication List      START taking these medications    Details   amoxicillin-clavulanate (AUGMENTIN) 875-125 mg per tablet Take 1 Tab by mouth every twelve (12) hours for 5 days. Indications: BACTERIAL URINARY TRACT INFECTION  Qty: 11 Tab, Refills: 0      memantine (NAMENDA) 10 mg tablet Take 1 Tab by mouth two (2) times a day. Indications: MODERATE TO SEVERE ALZHEIMER'S TYPE DEMENTIA  Qty: 60 Tab, Refills: 0      !! haloperidol (HALDOL) 2 mg tablet Take 1 Tab by mouth nightly. Indications: PSYCHOTIC DISORDER  Qty: 30 Tab, Refills: 0      !! haloperidol (HALDOL) 5 mg tablet Take 1 Tab by mouth nightly. Indications: PSYCHOTIC DISORDER  Qty: 30 Tab, Refills: 0       !! - Potential duplicate medications found. Please discuss with provider. CONTINUE these medications which have NOT CHANGED    Details   fish oil-omega-3 fatty acids 340-1,000 mg capsule Take 1 Cap by mouth daily. Indications: SUPPLEMENT      VITAMIN E, DL,TOCOPHERYL ACET, (VITAMIN E ACETATE) 400 unit cap capsule Take 400 Units by mouth daily. Indications: SUPPLEMENT      calcium 500 mg Tab Take 500 mg by mouth daily. Indications: HYPOCALCEMIA PREVENTION         STOP taking these medications       OLANZapine (ZYPREXA) 2.5 mg tablet Comments:   Reason for Stopping:                      A coordinated, multidisplinary treatment team round was conducted with Sobeida Hopper---this is done daily here at [facility] . This team consists of the nurse, psychiatric unit pharmcist,  and writer. I have spent greater than 35 minutes on discharge work.     Signed:  Raudle Linn MD  4/24/2017

## 2017-06-13 ENCOUNTER — OFFICE VISIT (OUTPATIENT)
Dept: INTERNAL MEDICINE CLINIC | Age: 72
End: 2017-06-13

## 2017-06-13 VITALS
WEIGHT: 126.6 LBS | HEART RATE: 90 BPM | RESPIRATION RATE: 16 BRPM | HEIGHT: 60 IN | DIASTOLIC BLOOD PRESSURE: 56 MMHG | SYSTOLIC BLOOD PRESSURE: 111 MMHG | OXYGEN SATURATION: 99 % | TEMPERATURE: 96.8 F | BODY MASS INDEX: 24.85 KG/M2

## 2017-06-13 DIAGNOSIS — F22 BIZARRE DELUSION (HCC): Primary | ICD-10-CM

## 2017-06-13 DIAGNOSIS — R29.898 WEAKNESS OF BOTH LOWER EXTREMITIES: ICD-10-CM

## 2017-06-13 DIAGNOSIS — F31.10 MANIC BIPOLAR I DISORDER (HCC): ICD-10-CM

## 2017-06-13 RX ORDER — RISPERIDONE 1 MG/ML
0.25 SOLUTION ORAL 2 TIMES DAILY
Qty: 30 ML | Refills: 1 | Status: SHIPPED | OUTPATIENT
Start: 2017-06-13 | End: 2017-07-12

## 2017-06-13 RX ORDER — RISPERIDONE 0.25 MG/1
TABLET, FILM COATED ORAL
Status: CANCELLED | OUTPATIENT
Start: 2017-06-13

## 2017-06-13 RX ORDER — RISPERIDONE 0.25 MG/1
TABLET, FILM COATED ORAL
COMMUNITY
Start: 2017-06-12 | End: 2017-06-13

## 2017-06-13 NOTE — PATIENT INSTRUCTIONS
Oral solution can be administered directly from the provided calibrated pipette or may be mixed with water, coffee, orange juice, or low-fat milk, but is not compatible with cola or tea.

## 2017-06-13 NOTE — PROGRESS NOTES
CASSIE Fish He is a 67 y.o. female, she presents today for:    First visit since admission to hospital 4/11/2017 for dementia with psychosis. At discharge was to have follow-up with [facility] nurse/physicians as well as neurologist.    Was seen by psychologist and prescription has been changed. Daughter advises that she has \"nasra\" . Riky Richardson. (Just saw patient yesterday). [facility]. Both legs with mild shuffling gain, difficult turning. Notes her heart beats fast at night. Otherwise feeling well    PMH/PSH: reviewed and updated  Sochx:  reports that she has never smoked. She does not have any smokeless tobacco history on file. She reports that she does not drink alcohol or use illicit drugs. Famhx: reviewed and updated     All: No Known Allergies  Med:   Current Outpatient Prescriptions   Medication Sig    risperiDONE (RISPERDAL) 1 mg/mL oral solution Take 0.25 mL by mouth two (2) times a day.  memantine (NAMENDA) 10 mg tablet Take 1 Tab by mouth two (2) times a day. Indications: MODERATE TO SEVERE ALZHEIMER'S TYPE DEMENTIA    fish oil-omega-3 fatty acids 340-1,000 mg capsule Take 1 Cap by mouth daily. Indications: SUPPLEMENT    VITAMIN E, DL,TOCOPHERYL ACET, (VITAMIN E ACETATE) 400 unit cap capsule Take 400 Units by mouth daily. Indications: SUPPLEMENT    calcium 500 mg Tab Take 500 mg by mouth daily. Indications: HYPOCALCEMIA PREVENTION     No current facility-administered medications for this visit. Review of Systems   Constitutional: Negative for chills and fever. Respiratory: Negative for cough. Cardiovascular: Negative for chest pain. limited, patient reports being concerned that an Yanely is tracking er and the [employer]  [address] is watching. PE:  Blood pressure 111/56, pulse 90, temperature 96.8 °F (36 °C), temperature source Oral, resp. rate 16, height 5' (1.524 m), weight 126 lb 9.6 oz (57.4 kg), SpO2 99 %. Body mass index is 24.72 kg/(m^2).   Physical Exam Constitutional: She is oriented to person, place, and time. She appears well-developed and well-nourished. No distress. HENT:   Head: Normocephalic. Mouth/Throat: Oropharynx is clear and moist.   Eyes: Conjunctivae and EOM are normal.   Neck: Neck supple. Cardiovascular: Normal rate, regular rhythm and normal heart sounds. Pulmonary/Chest: Effort normal and breath sounds normal.   Musculoskeletal:   Holds balance with eyes closed, no pill rolling tremor. However does demonstrate shuffling gait with en bloc turning . 4/5 strength bilaterally in lower extremities   Neurological: She is alert and oriented to person, place, and time. Skin: Skin is warm and dry. Nursing note and vitals reviewed. A/P:  67 y.o. 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 Manic bipolar I disorder ([facility]) F31.10 296.40 risperiDONE (RISPERDAL) 1 mg/mL oral solution   3. Weakness of both lower extremities R29.898 729.89 REFERRAL TO PHYSICAL THERAPY     Bizarre delusions, possible nasra. Patient did not tolerate haldol due to side effects changed to risperidone but not compliant to medication, provided liquid formulation to help daughter administer. To continue to follow with care more provider.    - consider further work-up at follow-up if delusions improved, including MRI and ref to neurology    Deblity: with weakness of lower extremities risk of falls, referred to PT. Follow-up Disposition:  Return in about 2 weeks (around 6/27/2017) for follow-up medication change.

## 2017-06-13 NOTE — MR AVS SNAPSHOT
Visit Information Date & Time Provider Department Dept. Phone Encounter #  
 6/13/2017  3:30 PM Preethi Danielson MD [address] [facility] [phone] Follow-up Instructions Return in about 2 weeks (around 6/27/2017) for follow-up medication change. Upcoming Health Maintenance Date Due DTaP/Tdap/Td series (1 - Tdap) 5/25/1965 FOBT Q 1 YEAR AGE 50-75 5/25/1994 ZOSTER VACCINE AGE 60> 5/25/2004 GLAUCOMA SCREENING Q2Y 5/25/2009 Pneumococcal 65+ Low/Medium Risk (1 of 2 - PCV13) 5/25/2009 MEDICARE YEARLY EXAM 5/25/2009 INFLUENZA AGE 9 TO ADULT 8/1/2017 BREAST CANCER SCRN MAMMOGRAM 3/31/2018 Allergies as of 6/13/2017  Review Complete On: 6/13/2017 By: Luis Garza LPN No Known Allergies Current Immunizations  Never Reviewed No immunizations on file. Not reviewed this visit You Were Diagnosed With   
  
 Codes Comments Bizarre delusion ([facility]    -  Primary ICD-10-CM: F22 
ICD-9-CM: 297.9 Manic bipolar I disorder ([facility].)     ICD-10-CM: F31.10 ICD-9-CM: 296.40 Weakness of both lower extremities     ICD-10-CM: R29.898 ICD-9-CM: 729.89 Vitals BP Pulse Temp Resp Height(growth percentile) Weight(growth percentile) 111/56 (BP 1 Location: Left arm, BP Patient Position: Sitting) 90 96.8 °F (36 °C) (Oral) 16 5' (1.524 m) 126 lb 9.6 oz (57.4 kg) SpO2 BMI OB Status Smoking Status 99% 24.72 kg/m2 Postmenopausal Never Smoker BMI and BSA Data Body Mass Index Body Surface Area 24.72 kg/m 2 1.56 m 2 Preferred Pharmacy Pharmacy Name Phone [id number] Betoeveline Robins, [address] [place] [address] [phone] Your Updated Medication List  
  
   
This list is accurate as of: 6/13/17  4:27 PM.  Always use your most recent med list.  
  
  
  
  
 calcium 500 mg Tab Take 500 mg by mouth daily. Indications: HYPOCALCEMIA PREVENTION  
  
 fish oil-omega-3 fatty acids 340-1,000 mg capsule Take 1 Cap by mouth daily. Indications: SUPPLEMENT  
  
 memantine 10 mg tablet Commonly known as:  Kevin Monks Take 1 Tab by mouth two (2) times a day. Indications: MODERATE TO SEVERE ALZHEIMER'S TYPE DEMENTIA  
  
 risperiDONE 1 mg/mL oral solution Commonly known as:  RisperDAL Take 0.25 mL by mouth two (2) times a day. vitamin E acetate 400 unit Cap capsule Take 400 Units by mouth daily. Indications: SUPPLEMENT Prescriptions Sent to Pharmacy Refills  
 risperiDONE (RISPERDAL) 1 mg/mL oral solution 1 Sig: Take 0.25 mL by mouth two (2) times a day. Class: Normal  
 [facility] [address] [place] [address], [address] [place] [address] #: [phone] Route: Oral  
  
We Performed the Following REFERRAL TO PHYSICAL THERAPY [RSP16 Custom] Comments:  
 please assess & treat for lower extremity weakness. Please authorize outside physical therapist for translation services and availability. Follow-up Instructions Return in about 2 weeks (around 6/27/2017) for follow-up medication change. Referral Information Referral ID Referred By Referred To  
  
 [id number] Britney Putnam Not Available Visits Status Start Date End Date 1 New Request 6/13/17 6/13/18 If your referral has a status of pending review or denied, additional information will be sent to support the outcome of this decision. Patient Instructions Oral solution can be administered directly from the provided calibrated pipette or may be mixed with water, coffee, orange juice, or low-fat milk, but is not compatible with cola or tea. Introducing [facility] & HEALTH SERVICES!    
 [facility] introduces [employer] patient portal. Now you can access parts of your medical record, email your doctor's office, and request medication refills online. 1. In your internet browser, go to [email or web address] 2. Click on the First Time User? Click Here link in the Sign In box. You will see the New Member Sign Up page. 3. Enter your [employer] Access Code exactly as it appears below. You will not need to use this code after youve completed the sign-up process. If you do not sign up before the expiration date, you must request a new code. · [employer] Access Code: [email or web address] Expires: 9/11/2017  3:27 PM 
 
4. Enter the last four digits of your Social Security Number (xxxx) and Date of Birth (mm/dd/yyyy) as indicated and click Submit. You will be taken to the next sign-up page. 5. Create a [employer] ID. This will be your [employer] login ID and cannot be changed, so think of one that is secure and easy to remember. 6. Create a [employer] password. You can change your password at any time. 7. Enter your Password Reset Question and Answer. This can be used at a later time if you forget your password. 8. Enter your e-mail address. You will receive e-mail notification when new information is available in [address] 9. Click Sign Up. You can now view and download portions of your medical record. 10. Click the Download Summary menu link to download a portable copy of your medical information. If you have questions, please visit the Frequently Asked Questions section of the [employer] website. Remember, [employer] is NOT to be used for urgent needs. For medical emergencies, dial 911. Now available from your iPhone and Android! Please provide this summary of care documentation to your next provider. Your primary care clinician is listed as Rony Ramirez. If you have any questions after today's visit, please call [phone].

## 2017-06-13 NOTE — PROGRESS NOTES
RM 4    Pt presents today with her daughter  Per daughter , pt is refusing to take any medications    Chief Complaint   Patient presents with    Dementia     follow up       1. Have you been to the ER, urgent care clinic since your last visit? Hospitalized since your last visit? No    2. Have you seen or consulted any other health care providers outside of the [address] [place] [address] since your last visit? Include any pap smears or colon screening.  No    Health Maintenance Due   Topic Date Due    DTaP/Tdap/Td series (1 - Tdap) 05/25/1965    FOBT Q 1 YEAR AGE 50-75  05/25/1994    ZOSTER VACCINE AGE 60>  05/25/2004    GLAUCOMA SCREENING Q2Y  05/25/2009    Pneumococcal 65+ Low/Medium Risk (1 of 2 - PCV13) 05/25/2009    MEDICARE YEARLY EXAM  05/25/2009     Living will sent to pt AVS

## 2017-06-28 ENCOUNTER — DOCUMENTATION ONLY (OUTPATIENT)
Dept: INTERNAL MEDICINE CLINIC | Age: 72
End: 2017-06-28

## 2017-06-28 NOTE — PROGRESS NOTES
Pt was at [facility]  physical therapy and forgot he referral  Received a call from [facility] requesting referral for pt.  Referral faxed over to [facility] fax # [phone]

## 2017-07-12 ENCOUNTER — OFFICE VISIT (OUTPATIENT)
Dept: INTERNAL MEDICINE CLINIC | Age: 72
End: 2017-07-12

## 2017-07-12 VITALS
SYSTOLIC BLOOD PRESSURE: 126 MMHG | TEMPERATURE: 98.4 F | OXYGEN SATURATION: 97 % | BODY MASS INDEX: 25.09 KG/M2 | HEART RATE: 89 BPM | HEIGHT: 60 IN | WEIGHT: 127.8 LBS | RESPIRATION RATE: 16 BRPM | DIASTOLIC BLOOD PRESSURE: 65 MMHG

## 2017-07-12 DIAGNOSIS — G20 DEMENTIA DUE TO PARKINSON'S DISEASE WITH BEHAVIORAL DISTURBANCE (HCC): Primary | ICD-10-CM

## 2017-07-12 DIAGNOSIS — Z23 ENCOUNTER FOR IMMUNIZATION: ICD-10-CM

## 2017-07-12 DIAGNOSIS — F02.818 DEMENTIA DUE TO PARKINSON'S DISEASE WITH BEHAVIORAL DISTURBANCE (HCC): Primary | ICD-10-CM

## 2017-07-12 DIAGNOSIS — Z12.39 BREAST CANCER SCREENING: ICD-10-CM

## 2017-07-12 RX ORDER — CARBIDOPA AND LEVODOPA 25; 100 MG/1; MG/1
1.5 TABLET ORAL 3 TIMES DAILY
COMMUNITY
End: 2018-04-05

## 2017-07-12 RX ORDER — RISPERIDONE 0.25 MG/1
TABLET, FILM COATED ORAL
Refills: 0 | COMMUNITY
Start: 2017-06-12 | End: 2017-07-19 | Stop reason: SDUPTHER

## 2017-07-12 NOTE — PROGRESS NOTES
HPI   Margarita Mcdermott He is a 67 y.o. female, she presents today for:     Seen by neurologist and diagnosed with parkinson disease. Has started to have some    Has been going to therapy at [facility],   Is taking parkinson tablet 3 times per day. PMH/PSH: reviewed and updated  Sochx:  reports that she has never smoked. She has never used smokeless tobacco. She reports that she does not drink alcohol or use illicit drugs. Famhx: reviewed and updated     All: No Known Allergies  Med:   Current Outpatient Prescriptions   Medication Sig    risperiDONE (RISPERDAL) 0.25 mg tablet TAKE 1 TABLET BY MOUTH TWICE DAILY    carbidopa-levodopa (SINEMET)  mg per tablet Take 1 Tab by mouth three (3) times daily.  fish oil-omega-3 fatty acids 340-1,000 mg capsule Take 1 Cap by mouth daily. Indications: SUPPLEMENT    VITAMIN E, DL,TOCOPHERYL ACET, (VITAMIN E ACETATE) 400 unit cap capsule Take 400 Units by mouth daily. Indications: SUPPLEMENT    calcium 500 mg Tab Take 500 mg by mouth daily. Indications: HYPOCALCEMIA PREVENTION    risperiDONE (RISPERDAL) 1 mg/mL oral solution Take 0.25 mL by mouth two (2) times a day.  memantine (NAMENDA) 10 mg tablet Take 1 Tab by mouth two (2) times a day. Indications: MODERATE TO SEVERE ALZHEIMER'S TYPE DEMENTIA     No current facility-administered medications for this visit. Review of Systems   Constitutional: Negative for chills, fever and malaise/fatigue. Respiratory: Negative for shortness of breath. Cardiovascular: Negative for chest pain. PE:  Blood pressure 126/65, pulse 89, temperature 98.4 °F (36.9 °C), temperature source Oral, resp. rate 16, height 5' (1.524 m), weight 127 lb 12.8 oz (58 kg), SpO2 97 %. Body mass index is 24.96 kg/(m^2). Physical Exam   Constitutional: She is oriented to person, place, and time. No distress. HENT:   Head: Normocephalic.    Mouth/Throat: Oropharynx is clear and moist.   Eyes: Conjunctivae and EOM are normal.   Neck: Neck supple. Cardiovascular: Normal rate, regular rhythm and normal heart sounds. Pulmonary/Chest: Effort normal and breath sounds normal.   Neurological: She is alert and oriented to person, place, and time. Skin: Skin is warm and dry. Nursing note and vitals reviewed. Labs:   No results found for any visits on 07/12/17. A/P:  67 y.o. female    ICD-10-CM ICD-9-CM    1. Dementia due to Parkinson's disease with behavioral disturbance ([facility] G20 332.0     F02.81 294.11    2. Breast cancer screening Z12.39 V76.10 CHAVO MAMMO BI SCREENING INCL CAD   3. Encounter for immunization Z23 V03.89 PNEUMOCOCCAL CONJ VACCINE 13 VALENT IM     Discussed possible diagnosis of parkinsons' disease: to follow-up further with neurology. Okay to give both tablets of risperdol in morning. Continue physical therapy and starting to be more physically active. Denies any problem with bladder.     - She was given AVS and expressed understanding with the diagnosis and plan as discussed. Follow-up Disposition:  Return in about 2 months (around 9/12/2017) for medicare wellness.   Future Appointments  Date Time Provider Kyler Dsouza   9/12/2017 3:00 PM Yuliet Holley MD [facility]

## 2017-07-12 NOTE — PATIENT INSTRUCTIONS
Parkinson's Disease: Care Instructions  Your Care Instructions  Parkinson's disease can cause tremors, stiffness, and problems with movement. Severe or advanced cases can also cause problems with thinking. In Parkinson's disease, part of the brain cannot make enough dopamine, a chemical that helps control movement. Taking your medicines correctly and getting regular exercise may help you maintain your quality of life. There are many things that can cause Parkinson's disease symptoms, including some medicine, some toxins, and trauma to the head. The cause in most cases is not known. Follow-up care is a key part of your treatment and safety. Be sure to make and go to all appointments, and call your doctor if you are having problems. Its also a good idea to know your test results and keep a list of the medicines you take. How can you care for yourself at home? General care  · Take your medicines exactly as prescribed. Call your doctor if you think you are having a problem with your medicine. · Make sure your home is safe:  ¨ Place furniture so that you have something to hold on to as you walk around the house. ¨ Use chairs that make it easier to sit down and stand up. ¨ Group the things you use most, such as reading glasses, keys, and the telephone, in one easy-to-reach place. ¨ Tack down rugs so that you do not trip. ¨ Put no-slip tape and handrails in the tub to prevent falls. · Use a cane, walker, or scooter if your doctor suggests it. · Keep up your normal activities as much as you can. · Find ways to manage stress, which can make symptoms worse. · Spend time with family and friends. Join a support group for people with Parkinson's disease if you want extra help. · Depression is common with this condition. Tell your doctor if you have trouble sleeping, are eating too much or are not hungry, or feel sad or tearful all the time. Depression can be treated with medicine and counseling.   Diet and exercise  · Eat a balanced diet. · If you are taking levodopa, do not eat protein at the same time you take your medicine. Levodopa may not work as well if you take it at the same time you eat protein. You can eat normal amounts of protein. Talk to your doctor if you have questions. · If you have problems swallowing, change how and what you eat:  ¨ Try thick drinks, such as milk shakes. They are easier to swallow than other fluids. ¨ Do not eat foods that crumble easily. These can cause choking. ¨ Use a  to prepare food. Soft foods need less chewing. ¨ Eat small meals often so that you do not get tired from eating heavy meals. · Drink plenty of water and eat a high-fiber diet to prevent constipation. Parkinson'sand the medicines that treat itmay slow your intestines. · Get exercise on most days. Work with your doctor to set up a program of walking, swimming, or other exercise you are able to do. When should you call for help? Call your doctor now or seek immediate medical care if:  · You have a change in your symptoms. · You develop other problems from your condition, such as:  ¨ Injury from a fall. ¨ Thinking or memory problems. ¨ A urinary tract infection (burning pain when urinating). Watch closely for changes in your health, and be sure to contact your doctor if:  · You lose weight because of problems with eating. · You want more information about your condition or your medicines. Where can you learn more? Go to http://jose juan-juancarlos.info/. Enter B313 in the search box to learn more about \"Parkinson's Disease: Care Instructions. \"  Current as of: October 14, 2016  Content Version: 11.3  © 5723-9273 [employer]. Care instructions adapted under license by [employer] (which disclaims liability or warranty for this information).  If you have questions about a medical condition or this instruction, always ask your healthcare professional. Angela Gallardo[employer] disclaims any warranty or liability for your use of this information.

## 2017-07-12 NOTE — PROGRESS NOTES
Edwige Dillon He is a 67 y.o. female   Chief Complaint   Patient presents with    Dementia         1. Have you been to an emergency room, urgent clinic, or hospitalized since your last visit? NO  If yes, where when, and reason for visit? 2. Have seen or consulted any other health care provider since your last visit? NO  Please include any pap smears or colon screening in this section  If yes, where when, and reason for visit? 6. Do you have an Advanced Directive/ Living Will in place?  NO  If yes, do we have a copy on file NO  If no, would you like information NO

## 2017-07-12 NOTE — MR AVS SNAPSHOT
Visit Information Date & Time Provider Department Dept. Phone Encounter #  
 7/12/2017  3:30 PM Paulette Hunter MD [address] [facility] [phone] [id number] Follow-up Instructions Return in about 2 months (around 9/12/2017) for medicare wellness. Upcoming Health Maintenance Date Due DTaP/Tdap/Td series (1 - Tdap) 5/25/1965 FOBT Q 1 YEAR AGE 50-75 5/25/1994 ZOSTER VACCINE AGE 60> 5/25/2004 GLAUCOMA SCREENING Q2Y 5/25/2009 Pneumococcal 65+ Low/Medium Risk (1 of 2 - PCV13) 5/25/2009 INFLUENZA AGE 9 TO ADULT 8/1/2017 BREAST CANCER SCRN MAMMOGRAM 3/31/2018 MEDICARE YEARLY EXAM 4/11/2018 Allergies as of 7/12/2017  Review Complete On: 7/12/2017 By: Ronda CAMACHO Rash, LPN No Known Allergies Current Immunizations  Never Reviewed Name Date Pneumococcal Conjugate (PCV-13)  Incomplete Not reviewed this visit You Were Diagnosed With   
  
 Codes Comments Dementia due to Parkinson's disease with behavioral disturbance ([facility]    -  Primary ICD-10-CM: G20, F02.81 ICD-9-CM: 332.0, 294.11 Breast cancer screening     ICD-10-CM: Z12.39 
ICD-9-CM: V76.10 Encounter for immunization     ICD-10-CM: A76 ICD-9-CM: V03.89 Vitals BP Pulse Temp Resp Height(growth percentile) Weight(growth percentile) 126/65 (BP 1 Location: Left arm, BP Patient Position: Sitting) 89 98.4 °F (36.9 °C) (Oral) 16 5' (1.524 m) 127 lb 12.8 oz (58 kg) SpO2 BMI OB Status Smoking Status 97% 24.96 kg/m2 Postmenopausal Never Smoker BMI and BSA Data Body Mass Index Body Surface Area 24.96 kg/m 2 1.57 m 2 Preferred Pharmacy Pharmacy Name Phone [id number] Jose Doctor, [address] [place] [address] [phone] Your Updated Medication List  
  
   
This list is accurate as of: 7/12/17  4:40 PM.  Always use your most recent med list.  
  
  
  
  
 calcium 500 mg Tab Take 500 mg by mouth daily. Indications: HYPOCALCEMIA PREVENTION  
  
 carbidopa-levodopa  mg per tablet Commonly known as:  SINEMET Take 1 Tab by mouth three (3) times daily. fish oil-omega-3 fatty acids 340-1,000 mg capsule Take 1 Cap by mouth daily. Indications: SUPPLEMENT  
  
 memantine 10 mg tablet Commonly known as:  Mesha Nuno Take 1 Tab by mouth two (2) times a day. Indications: MODERATE TO SEVERE ALZHEIMER'S TYPE DEMENTIA * risperiDONE 0.25 mg tablet Commonly known as:  RisperDAL TAKE 1 TABLET BY MOUTH TWICE DAILY * risperiDONE 1 mg/mL oral solution Commonly known as:  RisperDAL Take 0.25 mL by mouth two (2) times a day. vitamin E acetate 400 unit Cap capsule Take 400 Units by mouth daily. Indications: SUPPLEMENT * Notice: This list has 2 medication(s) that are the same as other medications prescribed for you. Read the directions carefully, and ask your doctor or other care provider to review them with you. We Performed the Following PNEUMOCOCCAL CONJ VACCINE 13 VALENT IM [id number] CPT(R)] Follow-up Instructions Return in about 2 months (around 9/12/2017) for medicare wellness. To-Do List   
 07/13/2017 Imaging:  CHAVO MAMMO BI SCREENING INCL CAD Patient Instructions Parkinson's Disease: Care Instructions Your Care Instructions Parkinson's disease can cause tremors, stiffness, and problems with movement. Severe or advanced cases can also cause problems with thinking. In Parkinson's disease, part of the brain cannot make enough dopamine, a chemical that helps control movement. Taking your medicines correctly and getting regular exercise may help you maintain your quality of life. There are many things that can cause Parkinson's disease symptoms, including some medicine, some toxins, and trauma to the head. The cause in most cases is not known. Follow-up care is a key part of your treatment and safety. Be sure to make and go to all appointments, and call your doctor if you are having problems. Its also a good idea to know your test results and keep a list of the medicines you take. How can you care for yourself at home? General care · Take your medicines exactly as prescribed. Call your doctor if you think you are having a problem with your medicine. · Make sure your home is safe: 
¨ Place furniture so that you have something to hold on to as you walk around the house. ¨ Use chairs that make it easier to sit down and stand up. ¨ Group the things you use most, such as reading glasses, keys, and the telephone, in one easy-to-reach place. ¨ Tack down rugs so that you do not trip. ¨ Put no-slip tape and handrails in the tub to prevent falls. · Use a cane, walker, or scooter if your doctor suggests it. · Keep up your normal activities as much as you can. · Find ways to manage stress, which can make symptoms worse. · Spend time with family and friends. Join a support group for people with Parkinson's disease if you want extra help. · Depression is common with this condition. Tell your doctor if you have trouble sleeping, are eating too much or are not hungry, or feel sad or tearful all the time. Depression can be treated with medicine and counseling. Diet and exercise · Eat a balanced diet. · If you are taking levodopa, do not eat protein at the same time you take your medicine. Levodopa may not work as well if you take it at the same time you eat protein. You can eat normal amounts of protein. Talk to your doctor if you have questions. · If you have problems swallowing, change how and what you eat: ¨ Try thick drinks, such as milk shakes. They are easier to swallow than other fluids. ¨ Do not eat foods that crumble easily. These can cause choking. ¨ Use a  to prepare food. Soft foods need less chewing. ¨ Eat small meals often so that you do not get tired from eating heavy meals. · Drink plenty of water and eat a high-fiber diet to prevent constipation. Parkinson'sand the medicines that treat itmay slow your intestines. · Get exercise on most days. Work with your doctor to set up a program of walking, swimming, or other exercise you are able to do. When should you call for help? Call your doctor now or seek immediate medical care if: 
· You have a change in your symptoms. · You develop other problems from your condition, such as: ¨ Injury from a fall. ¨ Thinking or memory problems. ¨ A urinary tract infection (burning pain when urinating). Watch closely for changes in your health, and be sure to contact your doctor if: 
· You lose weight because of problems with eating. · You want more information about your condition or your medicines. Where can you learn more? Go to http://jose juan-juancarlos.info/. Enter X587 in the search box to learn more about \"Parkinson's Disease: Care Instructions. \" Current as of: October 14, 2016 Content Version: 11.3 © 8303-9958 [employer]. Care instructions adapted under license by [employer] (which disclaims liability or warranty for this information). If you have questions about a medical condition or this instruction, always ask your healthcare professional. [address] any warranty or liability for your use of this information. Introducing [facility] & HEALTH SERVICES! Nancy Durand introduces [employer] patient portal. Now you can access parts of your medical record, email your doctor's office, and request medication refills online. 1. In your internet browser, go to [email or web address] 2. Click on the First Time User? Click Here link in the Sign In box. You will see the New Member Sign Up page. 3. Enter your [employer] Access Code exactly as it appears below.  You will not need to use this code after youve completed the sign-up process. If you do not sign up before the expiration date, you must request a new code. · [employer] Access Code: [email or web address] Expires: 9/11/2017  3:27 PM 
 
4. Enter the last four digits of your Social Security Number (xxxx) and Date of Birth (mm/dd/yyyy) as indicated and click Submit. You will be taken to the next sign-up page. 5. Create a [employer]Jam ID. This will be your [employer]Jam login ID and cannot be changed, so think of one that is secure and easy to remember. 6. Create a [employer] password. You can change your password at any time. 7. Enter your Password Reset Question and Answer. This can be used at a later time if you forget your password. 8. Enter your e-mail address. You will receive e-mail notification when new information is available in [address] 9. Click Sign Up. You can now view and download portions of your medical record. 10. Click the Download Summary menu link to download a portable copy of your medical information. If you have questions, please visit the Frequently Asked Questions section of the [employer] website. Remember, [employer] is NOT to be used for urgent needs. For medical emergencies, dial 911. Now available from your iPhone and Android! Please provide this summary of care documentation to your next provider. Your primary care clinician is listed as Jossie Witt. If you have any questions after today's visit, please call [phone].

## 2017-07-20 ENCOUNTER — HOSPITAL ENCOUNTER (OUTPATIENT)
Dept: MAMMOGRAPHY | Age: 72
Discharge: HOME | End: 2017-07-20
Attending: INTERNAL MEDICINE
Payer: MEDICARE

## 2017-07-20 DIAGNOSIS — Z12.39 BREAST CANCER SCREENING: ICD-10-CM

## 2017-07-20 PROCEDURE — 77067 SCR MAMMO BI INCL CAD: CPT

## 2017-08-02 ENCOUNTER — TELEPHONE (OUTPATIENT)
Dept: INTERNAL MEDICINE CLINIC | Age: 72
End: 2017-08-02

## 2017-08-02 DIAGNOSIS — R53.81 DEBILITY: Primary | ICD-10-CM

## 2017-08-02 NOTE — TELEPHONE ENCOUNTER
Don Garner from [place] states that pt is needing physical therapy again and needs an authorization. Don Garner states that the authorization can be faxed to [phone] or can be put into the portal. If any questions Don Garner can be reached at [phone] and ext .

## 2017-08-07 NOTE — TELEPHONE ENCOUNTER
[place]/telephone  Received: Today       [address] [facility] Office Fazal Quintanilla with [facility] is requesting the CPT code for physical therapy. [facility] number is [phone] and fax [phone].

## 2017-08-25 NOTE — TELEPHONE ENCOUNTER
Attempted to call. Unable to get in touch with anyone. Utilization department, whom I was transferred to is on lunch, [address] time. Will attempt to call again on Monday.

## 2017-09-01 NOTE — TELEPHONE ENCOUNTER
Per Utilization team, states to disregard msg unclear on their end what was being requested at that time of initial call.

## 2018-02-22 ENCOUNTER — APPOINTMENT (OUTPATIENT)
Dept: GENERAL RADIOLOGY | Age: 73
End: 2018-02-22
Attending: EMERGENCY MEDICINE
Payer: MEDICARE

## 2018-02-22 ENCOUNTER — APPOINTMENT (OUTPATIENT)
Dept: CT IMAGING | Age: 73
End: 2018-02-22
Attending: FAMILY MEDICINE
Payer: MEDICARE

## 2018-02-22 ENCOUNTER — HOSPITAL ENCOUNTER (OUTPATIENT)
Age: 73
Setting detail: OBSERVATION
Discharge: HOME | End: 2018-02-25
Attending: STUDENT IN AN ORGANIZED HEALTH CARE EDUCATION/TRAINING PROGRAM | Admitting: FAMILY MEDICINE
Payer: MEDICARE

## 2018-02-22 ENCOUNTER — APPOINTMENT (OUTPATIENT)
Dept: CT IMAGING | Age: 73
End: 2018-02-22
Attending: EMERGENCY MEDICINE
Payer: MEDICARE

## 2018-02-22 DIAGNOSIS — R47.01 APHASIA: Primary | ICD-10-CM

## 2018-02-22 DIAGNOSIS — R41.82 ALTERED MENTAL STATUS, UNSPECIFIED ALTERED MENTAL STATUS TYPE: ICD-10-CM

## 2018-02-22 DIAGNOSIS — F03.90 DEMENTIA WITHOUT BEHAVIORAL DISTURBANCE, UNSPECIFIED DEMENTIA TYPE: ICD-10-CM

## 2018-02-22 DIAGNOSIS — G93.40 ACUTE ENCEPHALOPATHY: ICD-10-CM

## 2018-02-22 LAB
ALBUMIN SERPL-MCNC: 3.1 G/DL (ref 3.5–5)
ALBUMIN/GLOB SERPL: 0.8 {RATIO} (ref 1.1–2.2)
ALP SERPL-CCNC: 56 U/L (ref 45–117)
ALT SERPL-CCNC: 15 U/L (ref 12–78)
AMMONIA PLAS-SCNC: 19 UMOL/L
ANION GAP SERPL CALC-SCNC: 6 MMOL/L (ref 5–15)
APPEARANCE UR: CLEAR
ARTERIAL PATENCY WRIST A: NO
AST SERPL-CCNC: 23 U/L (ref 15–37)
BACTERIA URNS QL MICRO: NEGATIVE /HPF
BASE EXCESS BLD CALC-SCNC: 8 MMOL/L
BASOPHILS # BLD: 0 K/UL (ref 0–0.1)
BASOPHILS NFR BLD: 0 % (ref 0–1)
BDY SITE: ABNORMAL
BILIRUB SERPL-MCNC: 1 MG/DL (ref 0.2–1)
BILIRUB UR QL: NEGATIVE
BUN SERPL-MCNC: 18 MG/DL (ref 6–20)
BUN/CREAT SERPL: 20 (ref 12–20)
CALCIUM SERPL-MCNC: 8.2 MG/DL (ref 8.5–10.1)
CHLORIDE SERPL-SCNC: 106 MMOL/L (ref 97–108)
CHOLEST SERPL-MCNC: 137 MG/DL
CK MB CFR SERPL CALC: NORMAL % (ref 0–2.5)
CK MB SERPL-MCNC: <1 NG/ML (ref 5–25)
CK SERPL-CCNC: 200 U/L (ref 26–192)
CK SERPL-CCNC: 220 U/L (ref 26–192)
CO2 SERPL-SCNC: 29 MMOL/L (ref 21–32)
COLOR UR: NORMAL
CREAT SERPL-MCNC: 0.88 MG/DL (ref 0.55–1.02)
DIFFERENTIAL METHOD BLD: NORMAL
EOSINOPHIL # BLD: 0.2 K/UL (ref 0–0.4)
EOSINOPHIL NFR BLD: 2 % (ref 0–7)
EPITH CASTS URNS QL MICRO: NORMAL /LPF
ERYTHROCYTE [DISTWIDTH] IN BLOOD BY AUTOMATED COUNT: 12.7 % (ref 11.5–14.5)
FLUAV AG NPH QL IA: NEGATIVE
FLUBV AG NOSE QL IA: NEGATIVE
GAS FLOW.O2 O2 DELIVERY SYS: ABNORMAL L/MIN
GLOBULIN SER CALC-MCNC: 3.8 G/DL (ref 2–4)
GLUCOSE BLD STRIP.AUTO-MCNC: 79 MG/DL (ref 65–100)
GLUCOSE SERPL-MCNC: 83 MG/DL (ref 65–100)
GLUCOSE UR STRIP.AUTO-MCNC: NEGATIVE MG/DL
HCO3 BLD-SCNC: 32.1 MMOL/L (ref 22–26)
HCT VFR BLD AUTO: 37.1 % (ref 35–47)
HDLC SERPL-MCNC: 64 MG/DL
HDLC SERPL: 2.1 {RATIO} (ref 0–5)
HGB BLD-MCNC: 12.5 G/DL (ref 11.5–16)
HGB UR QL STRIP: NEGATIVE
IMM GRANULOCYTES # BLD: 0 K/UL (ref 0–0.04)
IMM GRANULOCYTES NFR BLD AUTO: 0 % (ref 0–0.5)
INR BLD: 1 (ref 0.9–1.2)
KETONES UR QL STRIP.AUTO: NEGATIVE MG/DL
LACTATE SERPL-SCNC: 0.9 MMOL/L (ref 0.4–2)
LDLC SERPL CALC-MCNC: 59.4 MG/DL (ref 0–100)
LEUKOCYTE ESTERASE UR QL STRIP.AUTO: NEGATIVE
LIPID PROFILE,FLP: NORMAL
LYMPHOCYTES # BLD: 1.5 K/UL (ref 0.8–3.5)
LYMPHOCYTES NFR BLD: 24 % (ref 12–49)
MAGNESIUM SERPL-MCNC: 2.1 MG/DL (ref 1.6–2.4)
MCH RBC QN AUTO: 31.6 PG (ref 26–34)
MCHC RBC AUTO-ENTMCNC: 33.7 G/DL (ref 30–36.5)
MCV RBC AUTO: 93.7 FL (ref 80–99)
MONOCYTES # BLD: 0.5 K/UL (ref 0–1)
MONOCYTES NFR BLD: 8 % (ref 5–13)
NEUTS SEG # BLD: 4.2 K/UL (ref 1.8–8)
NEUTS SEG NFR BLD: 65 % (ref 32–75)
NITRITE UR QL STRIP.AUTO: NEGATIVE
NRBC # BLD: 0 K/UL (ref 0–0.01)
NRBC BLD-RTO: 0 PER 100 WBC
PCO2 BLD: 48.1 MMHG (ref 35–45)
PH BLD: 7.43 [PH] (ref 7.35–7.45)
PH UR STRIP: 6 [PH] (ref 5–8)
PHOSPHATE SERPL-MCNC: 2.9 MG/DL (ref 2.6–4.7)
PLATELET # BLD AUTO: 206 K/UL (ref 150–400)
PMV BLD AUTO: 9.7 FL (ref 8.9–12.9)
PO2 BLD: 65 MMHG (ref 80–100)
POTASSIUM SERPL-SCNC: 4 MMOL/L (ref 3.5–5.1)
PROT SERPL-MCNC: 6.9 G/DL (ref 6.4–8.2)
PROT UR STRIP-MCNC: NEGATIVE MG/DL
RBC # BLD AUTO: 3.96 M/UL (ref 3.8–5.2)
RBC #/AREA URNS HPF: NORMAL /HPF (ref 0–5)
SAO2 % BLD: 93 % (ref 92–97)
SERVICE CMNT-IMP: NORMAL
SODIUM SERPL-SCNC: 141 MMOL/L (ref 136–145)
SP GR UR REFRACTOMETRY: 1.02 (ref 1–1.03)
SPECIMEN TYPE: ABNORMAL
TRIGL SERPL-MCNC: 68 MG/DL (ref ?–150)
TROPONIN I SERPL-MCNC: <0.04 NG/ML
TROPONIN I SERPL-MCNC: <0.04 NG/ML
TSH SERPL DL<=0.05 MIU/L-ACNC: 0.66 UIU/ML (ref 0.36–3.74)
UROBILINOGEN UR QL STRIP.AUTO: 0.2 EU/DL (ref 0.2–1)
VLDLC SERPL CALC-MCNC: 13.6 MG/DL
WBC # BLD AUTO: 6.4 K/UL (ref 3.6–11)
WBC URNS QL MICRO: NORMAL /HPF (ref 0–4)

## 2018-02-22 PROCEDURE — 82607 VITAMIN B-12: CPT | Performed by: FAMILY MEDICINE

## 2018-02-22 PROCEDURE — 96361 HYDRATE IV INFUSION ADD-ON: CPT

## 2018-02-22 PROCEDURE — 71045 X-RAY EXAM CHEST 1 VIEW: CPT

## 2018-02-22 PROCEDURE — 82550 ASSAY OF CK (CPK): CPT | Performed by: EMERGENCY MEDICINE

## 2018-02-22 PROCEDURE — 85025 COMPLETE CBC W/AUTO DIFF WBC: CPT | Performed by: EMERGENCY MEDICINE

## 2018-02-22 PROCEDURE — 36600 WITHDRAWAL OF ARTERIAL BLOOD: CPT

## 2018-02-22 PROCEDURE — 74011250636 HC RX REV CODE- 250/636: Performed by: FAMILY MEDICINE

## 2018-02-22 PROCEDURE — 85610 PROTHROMBIN TIME: CPT

## 2018-02-22 PROCEDURE — 87804 INFLUENZA ASSAY W/OPTIC: CPT | Performed by: EMERGENCY MEDICINE

## 2018-02-22 PROCEDURE — 82962 GLUCOSE BLOOD TEST: CPT

## 2018-02-22 PROCEDURE — 84443 ASSAY THYROID STIM HORMONE: CPT | Performed by: FAMILY MEDICINE

## 2018-02-22 PROCEDURE — 80061 LIPID PANEL: CPT | Performed by: FAMILY MEDICINE

## 2018-02-22 PROCEDURE — 80053 COMPREHEN METABOLIC PANEL: CPT | Performed by: EMERGENCY MEDICINE

## 2018-02-22 PROCEDURE — 81001 URINALYSIS AUTO W/SCOPE: CPT | Performed by: EMERGENCY MEDICINE

## 2018-02-22 PROCEDURE — 82803 BLOOD GASES ANY COMBINATION: CPT

## 2018-02-22 PROCEDURE — 70450 CT HEAD/BRAIN W/O DYE: CPT

## 2018-02-22 PROCEDURE — 72125 CT NECK SPINE W/O DYE: CPT

## 2018-02-22 PROCEDURE — 87086 URINE CULTURE/COLONY COUNT: CPT | Performed by: EMERGENCY MEDICINE

## 2018-02-22 PROCEDURE — 84484 ASSAY OF TROPONIN QUANT: CPT | Performed by: EMERGENCY MEDICINE

## 2018-02-22 PROCEDURE — 99218 HC RM OBSERVATION: CPT

## 2018-02-22 PROCEDURE — 83605 ASSAY OF LACTIC ACID: CPT | Performed by: EMERGENCY MEDICINE

## 2018-02-22 PROCEDURE — 70496 CT ANGIOGRAPHY HEAD: CPT

## 2018-02-22 PROCEDURE — 99285 EMERGENCY DEPT VISIT HI MDM: CPT

## 2018-02-22 PROCEDURE — 84100 ASSAY OF PHOSPHORUS: CPT | Performed by: FAMILY MEDICINE

## 2018-02-22 PROCEDURE — 82553 CREATINE MB FRACTION: CPT | Performed by: EMERGENCY MEDICINE

## 2018-02-22 PROCEDURE — 82140 ASSAY OF AMMONIA: CPT | Performed by: FAMILY MEDICINE

## 2018-02-22 PROCEDURE — 83735 ASSAY OF MAGNESIUM: CPT | Performed by: FAMILY MEDICINE

## 2018-02-22 PROCEDURE — 36415 COLL VENOUS BLD VENIPUNCTURE: CPT | Performed by: EMERGENCY MEDICINE

## 2018-02-22 PROCEDURE — 87040 BLOOD CULTURE FOR BACTERIA: CPT | Performed by: EMERGENCY MEDICINE

## 2018-02-22 PROCEDURE — 96360 HYDRATION IV INFUSION INIT: CPT

## 2018-02-22 RX ORDER — CEPHALEXIN 500 MG/1
500 CAPSULE ORAL 3 TIMES DAILY
COMMUNITY
Start: 2018-02-21 | End: 2018-02-25

## 2018-02-22 RX ORDER — ONDANSETRON 2 MG/ML
4 INJECTION INTRAMUSCULAR; INTRAVENOUS
Status: DISCONTINUED | OUTPATIENT
Start: 2018-02-22 | End: 2018-02-25 | Stop reason: HOSPADM

## 2018-02-22 RX ORDER — SODIUM CHLORIDE 9 MG/ML
50 INJECTION, SOLUTION INTRAVENOUS CONTINUOUS
Status: DISCONTINUED | OUTPATIENT
Start: 2018-02-22 | End: 2018-02-25 | Stop reason: HOSPADM

## 2018-02-22 RX ORDER — QUETIAPINE FUMARATE 25 MG/1
12.5-25 TABLET, FILM COATED ORAL
COMMUNITY
End: 2018-02-25

## 2018-02-22 RX ORDER — SODIUM CHLORIDE 0.9 % (FLUSH) 0.9 %
10 SYRINGE (ML) INJECTION
Status: ACTIVE | OUTPATIENT
Start: 2018-02-22 | End: 2018-02-23

## 2018-02-22 RX ORDER — SODIUM CHLORIDE 0.9 % (FLUSH) 0.9 %
5-10 SYRINGE (ML) INJECTION AS NEEDED
Status: DISCONTINUED | OUTPATIENT
Start: 2018-02-22 | End: 2018-02-25 | Stop reason: HOSPADM

## 2018-02-22 RX ORDER — SODIUM CHLORIDE 0.9 % (FLUSH) 0.9 %
5-10 SYRINGE (ML) INJECTION EVERY 8 HOURS
Status: DISCONTINUED | OUTPATIENT
Start: 2018-02-22 | End: 2018-02-25 | Stop reason: HOSPADM

## 2018-02-22 RX ADMIN — SODIUM CHLORIDE 75 ML/HR: 900 INJECTION, SOLUTION INTRAVENOUS at 22:54

## 2018-02-22 RX ADMIN — Medication 10 ML: at 22:54

## 2018-02-22 NOTE — IP AVS SNAPSHOT
[address] [facility] 57 
[phone] Patient: Estephanie Hopper MRN: [id number] XWZ:[id number] About your hospitalization You were admitted on:  February 22, 2018 You last received care in the:  [address] [place] [address] 6S NEURO-SCI TELE You were discharged on:  February 25, 2018 Why you were hospitalized Your primary diagnosis was: Altered Mental Status Follow-up Information Follow up With Details Comments Contact Info Jessica Stevens MD Schedule an appointment as soon as possible for a visit in 1 week For follow up after hospitalization [address] [facility] C Jesse Marshall [id number] [phone] Luana Acharya NP Schedule an appointment as soon as possible for a visit in 1 week For follow up of dementia and behavioral difficulties [facility] [address] [phone] Mary Jonas MD Schedule an appointment as soon as possible for a visit in 2 weeks For follow up of Parkinson's disease [address] [facility] [address] [facility] [place] [address] 
[phone] AT HOME CARE  Referred for home health services. Service to begin the day after discharge. Please call the agency if no contact by 12 noon the day after discharge. [address] [place] [address] [phone] Discharge Orders None A check emilee indicates which time of day the medication should be taken. My Medications START taking these medications Instructions Each Dose to Equal  
 Morning Noon Evening Bedtime  
 traZODone 50 mg tablet Commonly known as:  Jon Prince Your last dose was: Your next dose is: Take 1 Tab by mouth nightly. 50 mg CONTINUE taking these medications Instructions Each Dose to Equal  
 Morning Noon Evening Bedtime  
 carbidopa-levodopa  mg per tablet Commonly known as:  SINEMET  
   
 Your last dose was: Your next dose is: Take 1.5 Tabs by mouth three (3) times daily. 1.5 Tab  
    
   
   
   
  
 fish oil-omega-3 fatty acids 340-1,000 mg capsule Your last dose was: Your next dose is: Take 1 Cap by mouth daily. Indications: SUPPLEMENT  
 1 Cap GLUCOSAMINE HCL PO Your last dose was: Your next dose is: Take 1 Tab by mouth daily. 1 Tab STOP taking these medications KEFLEX 500 mg capsule Generic drug:  cephALEXin SEROquel 25 mg tablet Generic drug:  QUEtiapine Where to Get Your Medications Information on where to get these meds will be given to you by the nurse or doctor. ! Ask your nurse or doctor about these medications  
  traZODone 50 mg tablet Discharge Instructions Please bring this form with you to show your primary care provider at your follow-up appointment. Primary care provider:  Dr. Naty Mane MD 
 
Discharging provider:  Cash Matamoros MD 
 
You have been admitted to the hospital with the following diagnoses: Altered mental status FOLLOW-UP CARE RECOMMENDATIONS: 
 
APPOINTMENTS: 
Follow-up Information Follow up With Details Comments Contact Info Naty Mane MD Schedule an appointment as soon as possible for a visit in 1 week For follow up after hospitalization [address] [place] [address] C Renny Devoid [address] 
[phone] Idalia Serna NP Schedule an appointment as soon as possible for a visit in 1 week For follow up of dementia and behavioral difficulties [facility] [address] [phone] Hilda Larios MD Schedule an appointment as soon as possible for a visit in 2 weeks For follow up of Parkinson's disease [place] [address] [address] [facility] [address] [place] [address] 
[phone] SYMPTOMS to watch for: fever, chills, nausea, vomiting, diarrhea, falling, weakness. DIET/what to eat:  Regular Diet ACTIVITY:  Activity as tolerated What to do if new or unexpected symptoms occur? If you experience any of the above symptoms (or should other concerns or questions arise after discharge) please call your primary care physician. Return to the emergency room if you cannot get hold of your doctor. · It is very important that you keep your follow-up appointment(s). · Please bring discharge papers, medication list (and/or medication bottles) to your follow-up appointments for review by your outpatient provider(s). · Please check the list of medications and be sure it includes every medication (even non-prescription medications) that your provider wants you to take. · It is important that you take the medication exactly as they are prescribed. · Keep your medication in the bottles provided by the pharmacist and keep a list of the medication names, dosages, and times to be taken in your wallet. · Do not take other medications without consulting your doctor. · If you have any questions about your medications or other instructions, please talk to your nurse or care provider before you leave the hospital. 
 
I understand that if any problems occur once I am at home I am to contact my physician. These instructions were explained to me and I had the opportunity to ask questions. tidy Announcement We are excited to announce that we are making your provider's discharge notes available to you in tidy. You will see these notes when they are completed and signed by the physician that discharged you from your recent hospital stay. If you have any questions or concerns about any information you see in [employer], please call the Health Information Department where you were seen or reach out to your Primary Care Provider for more information about your plan of care. Introducing [facility]! Oliver Mancia introduces As Seen on TV patient portal. Now you can access parts of your medical record, email your doctor's office, and request medication refills online. 1. In your internet browser, go to [email or web address] 2. Click on the First Time User? Click Here link in the Sign In box. You will see the New Member Sign Up page. 3. Enter your As Seen on TV Access Code exactly as it appears below. You will not need to use this code after youve completed the sign-up process. If you do not sign up before the expiration date, you must request a new code. · As Seen on TV Access Code: [facility] Expires: 5/26/2018 12:26 PM 
 
4. Enter the last four digits of your Social Security Number (xxxx) and Date of Birth (mm/dd/yyyy) as indicated and click Submit. You will be taken to the next sign-up page. 5. Create a As Seen on TV ID. This will be your As Seen on TV login ID and cannot be changed, so think of one that is secure and easy to remember. 6. Create a As Seen on TV password. You can change your password at any time. 7. Enter your Password Reset Question and Answer. This can be used at a later time if you forget your password. 8. Enter your e-mail address. You will receive e-mail notification when new information is available in [address] 9. Click Sign Up. You can now view and download portions of your medical record. 10. Click the Download Summary menu link to download a portable copy of your medical information. If you have questions, please visit the Frequently Asked Questions section of the As Seen on TV website. Remember, As Seen on TV is NOT to be used for urgent needs. For medical emergencies, dial 911. Now available from your iPhone and Android! Unresulted Labs-Please follow up with your PCP about these lab tests Order Current Status CULTURE, BLOOD, PAIRED Preliminary result Providers Seen During Your Hospitalization Provider Specialty Primary office phone Dee Alicia MD Emergency Medicine [phone] Justyna Koch MD Emergency Medicine [phone] Etelvina Cohen MD Hospitalist [phone] Saul Calles MD Internal Medicine [phone] Immunizations Administered for This Admission Name Date Influenza Vaccine (Quad) PF 2/25/2018 Your Primary Care Physician (PCP) Primary Care Physician Office Phone Office Fax Nellie Meier [phone] You are allergic to the following No active allergies Recent Documentation Weight BMI OB Status Smoking Status 53.8 kg 23.16 kg/m2 Postmenopausal Never Smoker Emergency Contacts Name Discharge Info Relation Home Work Mobile Mike Jaquez DISCHARGE CAREGIVER [3] Son [22] [phone] Patient Belongings The following personal items are in your possession at time of discharge: 
  Dental Appliances: Lowers, Uppers Please provide this summary of care documentation to your next provider. Signatures-by signing, you are acknowledging that this After Visit Summary has been reviewed with you and you have received a copy. Patient Signature:  ____________________________________________________________ Date:  ____________________________________________________________  
  
Marcello Lino Provider Signature:  ____________________________________________________________ Date:  ____________________________________________________________

## 2018-02-22 NOTE — IP AVS SNAPSHOT
[address] 
[phone] Patient: Demetrius Hopper MRN: [id number] FSY:4/57/4001 A check emilee indicates which time of day the medication should be taken. My Medications START taking these medications Instructions Each Dose to Equal  
 Morning Noon Evening Bedtime  
 traZODone 50 mg tablet Commonly known as:  Bonnita Genre Your last dose was: Your next dose is: Take 1 Tab by mouth nightly. 50 mg CONTINUE taking these medications Instructions Each Dose to Equal  
 Morning Noon Evening Bedtime  
 carbidopa-levodopa  mg per tablet Commonly known as:  SINEMET Your last dose was: Your next dose is: Take 1.5 Tabs by mouth three (3) times daily. 1.5 Tab  
    
   
   
   
  
 fish oil-omega-3 fatty acids 340-1,000 mg capsule Your last dose was: Your next dose is: Take 1 Cap by mouth daily. Indications: SUPPLEMENT  
 1 Cap GLUCOSAMINE HCL PO Your last dose was: Your next dose is: Take 1 Tab by mouth daily. 1 Tab STOP taking these medications KEFLEX 500 mg capsule Generic drug:  cephALEXin SEROquel 25 mg tablet Generic drug:  QUEtiapine Where to Get Your Medications Information on where to get these meds will be given to you by the nurse or doctor. ! Ask your nurse or doctor about these medications  
  traZODone 50 mg tablet
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
## 2018-02-22 NOTE — ED TRIAGE NOTES
Patient arrived with EMS from home with family. Patient went to PCP yesterday and diagnosed with UTI. Family reports patient unresponsive, last known well 12 hours ago. EMS reports patient responsive to painful stimuli. Blood glucose 76 per EMS. Hx of Parkinson's and dementia.

## 2018-02-23 ENCOUNTER — APPOINTMENT (OUTPATIENT)
Dept: CT IMAGING | Age: 73
End: 2018-02-23
Attending: FAMILY MEDICINE
Payer: MEDICARE

## 2018-02-23 LAB
ALBUMIN SERPL-MCNC: 3.1 G/DL (ref 3.5–5)
ALBUMIN/GLOB SERPL: 0.8 {RATIO} (ref 1.1–2.2)
ALP SERPL-CCNC: 61 U/L (ref 45–117)
ALT SERPL-CCNC: 21 U/L (ref 12–78)
ANION GAP SERPL CALC-SCNC: 7 MMOL/L (ref 5–15)
AST SERPL-CCNC: 26 U/L (ref 15–37)
BILIRUB SERPL-MCNC: 1 MG/DL (ref 0.2–1)
BUN SERPL-MCNC: 13 MG/DL (ref 6–20)
BUN/CREAT SERPL: 17 (ref 12–20)
CALCIUM SERPL-MCNC: 8.2 MG/DL (ref 8.5–10.1)
CHLORIDE SERPL-SCNC: 109 MMOL/L (ref 97–108)
CK SERPL-CCNC: 212 U/L (ref 26–192)
CO2 SERPL-SCNC: 25 MMOL/L (ref 21–32)
CREAT SERPL-MCNC: 0.76 MG/DL (ref 0.55–1.02)
GLOBULIN SER CALC-MCNC: 4 G/DL (ref 2–4)
GLUCOSE SERPL-MCNC: 82 MG/DL (ref 65–100)
POTASSIUM SERPL-SCNC: 3.9 MMOL/L (ref 3.5–5.1)
PROT SERPL-MCNC: 7.1 G/DL (ref 6.4–8.2)
SODIUM SERPL-SCNC: 141 MMOL/L (ref 136–145)
TROPONIN I SERPL-MCNC: <0.04 NG/ML
VIT B12 SERPL-MCNC: 728 PG/ML (ref 211–911)

## 2018-02-23 PROCEDURE — 99218 HC RM OBSERVATION: CPT

## 2018-02-23 PROCEDURE — 96361 HYDRATE IV INFUSION ADD-ON: CPT

## 2018-02-23 PROCEDURE — 74011250637 HC RX REV CODE- 250/637: Performed by: HOSPITALIST

## 2018-02-23 PROCEDURE — 82550 ASSAY OF CK (CPK): CPT | Performed by: FAMILY MEDICINE

## 2018-02-23 PROCEDURE — 71275 CT ANGIOGRAPHY CHEST: CPT

## 2018-02-23 PROCEDURE — 36415 COLL VENOUS BLD VENIPUNCTURE: CPT | Performed by: FAMILY MEDICINE

## 2018-02-23 PROCEDURE — 80053 COMPREHEN METABOLIC PANEL: CPT | Performed by: FAMILY MEDICINE

## 2018-02-23 PROCEDURE — 74011250636 HC RX REV CODE- 250/636: Performed by: FAMILY MEDICINE

## 2018-02-23 PROCEDURE — 74011636320 HC RX REV CODE- 636/320: Performed by: EMERGENCY MEDICINE

## 2018-02-23 PROCEDURE — 84484 ASSAY OF TROPONIN QUANT: CPT | Performed by: FAMILY MEDICINE

## 2018-02-23 RX ORDER — SODIUM CHLORIDE 0.9 % (FLUSH) 0.9 %
10 SYRINGE (ML) INJECTION
Status: COMPLETED | OUTPATIENT
Start: 2018-02-23 | End: 2018-02-23

## 2018-02-23 RX ORDER — CARBIDOPA AND LEVODOPA 25; 100 MG/1; MG/1
1.5 TABLET, ORALLY DISINTEGRATING ORAL 3 TIMES DAILY
Status: DISCONTINUED | OUTPATIENT
Start: 2018-02-23 | End: 2018-02-25 | Stop reason: HOSPADM

## 2018-02-23 RX ADMIN — Medication 10 ML: at 08:26

## 2018-02-23 RX ADMIN — Medication 10 ML: at 21:22

## 2018-02-23 RX ADMIN — IOPAMIDOL 75 ML: 755 INJECTION, SOLUTION INTRAVENOUS at 08:26

## 2018-02-23 RX ADMIN — CARBIDOPA AND LEVODOPA 1.5 TABLET: 25; 100 TABLET, ORALLY DISINTEGRATING ORAL at 21:22

## 2018-02-23 RX ADMIN — CARBIDOPA AND LEVODOPA 1.5 TABLET: 25; 100 TABLET, ORALLY DISINTEGRATING ORAL at 09:20

## 2018-02-23 RX ADMIN — CARBIDOPA AND LEVODOPA 1.5 TABLET: 25; 100 TABLET, ORALLY DISINTEGRATING ORAL at 18:30

## 2018-02-23 RX ADMIN — Medication 10 ML: at 06:14

## 2018-02-23 RX ADMIN — Medication 10 ML: at 15:05

## 2018-02-23 RX ADMIN — SODIUM CHLORIDE 75 ML/HR: 900 INJECTION, SOLUTION INTRAVENOUS at 12:43

## 2018-02-23 NOTE — PROGRESS NOTES
Cash Matamoros MD   Phone/text: [phone] (7am to 7pm)  After 7pm please call  for hospitalist on call    Hospitalist Progress Note     2/23/2018   PCP:  Dr. Naty Mane MD  Chief Complaint   Patient presents with    Altered mental status       Admission Summary:   The patient is a 76-year-old female with past medical history of dementia with agitation, history of recurrent UTIs, history of Parkinson's disease who presents to the hospital accompanied by her daughter, , and a family friend. Patient has dementia and is somnolent and not much history could be obtained from the patient. History was obtained from the daughter and her friend with translation from the  who does not speak any [place]. The daughter reports that the patient has a history of dementia and history of Parkinson disease, has had a slow decline. She walks with a walker, usually is not very interactive and does not interact with her family. She is able to eat, drink and watch TV on a regular basis, but for the past 2 days, they have noticed that the patient has not been \"doing so good. \"  The daughter reports that the patient has been more lethargic, has been sleeping in bed, not opening her eyes and this morning was found to be very cold. The daughter reports that the patient was \"leaking urine yesterday. \"     Reason For Visit:  AMS    Assessment/Plan   Acute metabolic encephalopathy on chronic dementia (POA) - unclear cause  - CT head 2/22 with small vessel ischemic changes in the periventricular white matter, no acute process  - CTA head 2/22 without acute abnormality  - Obtain MRI  - No signs of infection; Bcx 2/22 no growth, UA unremarkable, CXR without acute process, flu swab negative  - B12, ammonia, TSH wnl  - Re-orient as able    Dehydration (POA) - improving  - Continue IV fluids    Recent fall - no signs of injury  - CK near normal  - CT head as above  - CT neck 2/22 with spondylitic changes without acute abnormality  - PT/OT as able     History of Parkinson disease - unable to take PO  - NPO for now, but will give oral disintegrating tabs of parcopa    Dementia (chronic)  - Hold seroquel for now    See orders for other plans. VTE prophylaxis: SCDs  Discussed plan of care with Patient/Family, Nurse and    Pre-admission lived at home  Discharge plan: home  Estimated time to discharge: unclear     Subjective   Ms. Bethena Najjar He tracks with her eyes but can offer no history. Reviewed interval history  Physical examination     Visit Vitals    /68    Pulse 83    Temp 98.2 °F (36.8 °C)    Resp 21    SpO2 96%       Temp (24hrs), Av.5 °F (36.9 °C), Min:98.2 °F (36.8 °C), Max:99.1 °F (37.3 °C)      Intake/Output Summary (Last 24 hours) at 18 0810  Last data filed at 18 0400   Gross per 24 hour   Intake           1082.5 ml   Output                0 ml   Net           1082.5 ml       Gen - NAD  HEENT - MMM  Neck - supple, no thyromegaly  CV - RRR, no MRG  Resp - lungs CTA b/l, no wheezing, normal WOB  GI - abdomen S, NT, ND, +BS. No hepatosplenomegaly   - no CVA tenderness, bladder non-palpable in lower abdomen  MSK - increased tone and decrease bulk, no edema  Neuro - Alert, tracks with eyes, intermittently follows commands with right and left upper extremity if the action is pantomimmed    Data Review       Telemetry x   ECG    Xray x   CT scan x   MRI    Echocardiogram    Ultrasound              I have reviewed the flow sheet and recent notes  New labs and data personally reviewed.     Recent Labs      18   1835   WBC  6.4   HGB  12.5   HCT  37.1   PLT  206     Recent Labs      18   0458  18   2222  18   1835  18   1803   NA  141   --   141   --    K  3.9   --   4.0   --    CL  109*   --   106   --    CO2  25   --   29   --    GLU  82   --   83   --    BUN  13   --   18   --    CREA  0.76   --   0.88   --    CA  8.2*   -- 8.2*   --    MG   --   2.1   --    --    PHOS   --   2.9   --    --    ALB  3.1*   --   3.1*   --    TBILI  1.0   --   1.0   --    SGOT  26   --   23   --    ALT  21   --   15   --    INR   --    --    --   1.0       Medications reviewed  Current Facility-Administered Medications   Medication Dose Route Frequency    sodium chloride 0.9 % bolus infusion 100 mL  100 mL IntraVENous RAD ONCE    iopamidol (ISOVUE-370) 76 % injection 100 mL  100 mL IntraVENous RAD ONCE    sodium chloride (NS) flush 10 mL  10 mL IntraVENous RAD ONCE    sodium chloride (NS) flush 5-10 mL  5-10 mL IntraVENous Q8H    sodium chloride (NS) flush 5-10 mL  5-10 mL IntraVENous PRN    0.9% sodium chloride infusion  75 mL/hr IntraVENous CONTINUOUS    ondansetron (ZOFRAN) injection 4 mg  4 mg IntraVENous Q4H PRN     Current Outpatient Prescriptions   Medication Sig    QUEtiapine (SEROQUEL) 25 mg tablet Take 12.5-25 mg by mouth three (3) times daily as needed (agitation).  cephALEXin (KEFLEX) 500 mg capsule Take 500 mg by mouth three (3) times daily.  GLUCOSAMINE HCL PO Take 1 Tab by mouth daily.  carbidopa-levodopa (SINEMET)  mg per tablet Take 1.5 Tabs by mouth three (3) times daily.  fish oil-omega-3 fatty acids 340-1,000 mg capsule Take 1 Cap by mouth daily.  Indications: Anita Mckeon MD  Internal Medicine  2/23/2018

## 2018-02-23 NOTE — PROGRESS NOTES
[facility] nurse attempted to receive report from ED, called and was transferred, where no one answered. Will try again in 10minutes.

## 2018-02-23 NOTE — PROGRESS NOTES
Primary Nurse Emily Palma and Chico Chamberlain, RN performed a dual skin assessment on this patient No impairment noted  Jean score is 12.     Small rash in right elbow crease  Mid-abdominal scar from previous surgical incision

## 2018-02-23 NOTE — PROGRESS NOTES
Verbal  report given to Jagdeep Perez RN by Norma Lares RN. Report included the following information SBAR, ED Summary and Recent Results. 18 MRI screening completed but unable to sign as pad not functioning on all WOWs tried by RN.    Angella Guzman upon assessment pt NIH scored 22 verses 33 on admission to ED.     3845 Incontinent care performed, gown, sheet and bed pad changed, pt resting comfortably in bed smiling.    0630 MRI called for pt, pt is not A & O and no family present, will attempt MRI when family arrives. 0815 Bedside and Verbal shift change report given to [facility] (oncoming nurse) by Jagedep Perez (offgoing nurse). Report included the following information SBAR, Kardex, ED Summary, Intake/Output, MAR, Recent Results and Cardiac Rhythm normal sinus.

## 2018-02-23 NOTE — H&P
[address]  ACUTE CARE HISTORY AND PHYSICAL    Name:SANTIAGO Perera  MR#: [id number]  : 1945  ACCOUNT #: [de-identified]   DATE OF SERVICE: 2018    CHIEF COMPLAINT:  Altered mental status. HISTORY OF PRESENT ILLNESS:  The patient is a 70-year-old female with past medical history of dementia with agitation, history of recurrent UTIs, history of Parkinson's disease who presents to the hospital accompanied by her daughter, , and a family friend. Patient has dementia and is somnolent and not much history could be obtained from the patient. History was obtained from the daughter and her friend with translation from the  who does not speak any [place]. The daughter reports that the patient has a history of dementia and history of Parkinson disease, has had a slow decline. She walks with a walker, usually is not very interactive and does not interact with her family. She is able to eat, drink and watch TV on a regular basis, but for the past 2 days, they have noticed that the patient has not been \"doing so good. \"  The daughter reports that the patient has been more lethargic, has been sleeping in bed, not opening her eyes and this morning was found to be very cold. The daughter reports that the patient was \"leaking urine yesterday. \"  She got concerned, called the primary care physician and patient was started on Keflex. The daughter reports that the patient generally responds to verbal or pain stimuli, but making facial expressions, but has not been doing that for the past couple of days. Daughter reports that the patient usually is able to use her walker and go to the bathroom, but has not been doing that for the past couple of days.   Daughter reports this morning she tried to arouse the patient but the patient would not open her eyes and she got concerned and decided to bring the patient to the hospital.      Patient currently on my examination is able to open her eyes, but is not interactive and does not answer any questions, does withdraw to pain. The daughter denies any recent history of headaches, blurry vision. Daughter denies any recent history of diarrhea, fever, chills, any palpitations, sore throat or runny nose or any other concerns or problems. Daughter does report that about 4-5 days back, the patient had a ground level fall where she was trying to move from one place to another in the house and lost her balance and fell backwards. The  tried to hold her but he fell along with her. The  reports that the patient only a small hematoma on the head but did not hit any other part of the body. No further history can be obtained from the daughter, the patient's friend and the . PAST MEDICAL HISTORY:  See above. HOME MEDICATIONS:  Currently, the patient is on Seroquel 12.5 to 25 mg t.i.d. as needed,  cephalexin, glucosamine, Sinemet, and fish oil omega acid. SOCIAL HISTORY:  No history of tobacco abuse. No alcohol use, no IV drug abuse. ALLERGIES:  NO KNOWN DRUG ALLERGIES. FAMILY HISTORY:  Cannot be obtained from the patient. REVIEW OF SYSTEMS:  Cannot be obtained from the patient. PHYSICAL EXAMINATION:  VITAL SIGNS:  Temperature 99.1, pulse 80, respiratory rate 16, blood pressure 162/72, pulse ox 97% on room air. GENERAL:  Somnolent female, appears to open eyes on verbal commands, but does not answer any questions. Appears to be demented, appears to be stated age, resting in bed. HEENT:  Pupils equal and reactive to light. Dry mucous membranes. NECK:  Supple. CHEST:  Clear to auscultation bilaterally. CORONARY:  S1, S2 are heard. ABDOMEN:  Soft, nontender, nondistended. Bowel sounds are physiologic. EXTREMITIES:  No clubbing, no cyanosis, no edema. NEUROPSYCH:  Demented female. No neurological exam could be performed except for DTRs 1+/4. SKIN:  Warm.     LABORATORY DATA:  White count 6.4, hemoglobin 12.5, hematocrit 37.1, platelets 452. Urine shows no signs of infection. Sodium 141, potassium 4, chloride 106, bicarb 29, anion gap 6, glucose 83, BUN 18, creatinine 0.88, calcium 8.2, bilirubin total 1.0, ALT 15, AST 23, alkaline phosphatase 56. , CK-MB less than 1, troponin less than 0.04. Blood cultures and urine culture have been sent. Influenza A and B are negative. X-ray of the chest shows no acute process. ASSESSMENT AND PLAN:  1. Altered mental status/failure to thrive, probably secondary to worsening dementia and/or Parkinson disease. We will rule out metabolic/infective causes. We will start patient on gentle IV hydration as this could be secondary to superimposed dehydration. We will provide neurovascular checks. We will get TSH, B12, folate and ammonia level. We will get an MRI of the brain to rule out any central etiology. Urine and blood cultures have been sent. Further intervention will be per hospital course. I suspect that this is a progression of the natural disease of the patient and that was conveyed to the family. No obvious signs of infection. The patient is not febrile. Patient has low-grade fever, but no white count, no meningeal signs and no other obvious source of infection. 2.  History of Parkinson disease. Currently, the patient appears to be unable to take oral medication. We will restart medications in the morning. 3.  Dementia, see above. 4.  Dehydration. We will provide gentle IV hydration. Continue to monitor. 5.  Gastrointestinal and deep venous thrombosis prophylaxis. Patient will be on sequential compression devices.       Kristie Germain MD MM / RN  D: 02/22/2018 20:49     T: 02/22/2018 21:46  JOB #: [id number]

## 2018-02-23 NOTE — PROGRESS NOTES
Admission Medication Reconciliation:    Information obtained from: Patient's family, RX query, RX labels    Significant PMH/Disease States:   Past Medical History:   Diagnosis Date    History of bladder infections     Osteopenia        Chief Complaint for this Admission:  AMS    Allergies:  Review of patient's allergies indicates no known allergies. Prior to Admission Medications:   Prior to Admission Medications   Prescriptions Last Dose Informant Patient Reported? Taking? GLUCOSAMINE HCL PO   Yes Yes   Sig: Take 1 Tab by mouth daily. QUEtiapine (SEROQUEL) 25 mg tablet   Yes Yes   Sig: Take 12.5-25 mg by mouth three (3) times daily as needed (agitation). carbidopa-levodopa (SINEMET)  mg per tablet 2/21/2018 at Unknown time  Yes Yes   Sig: Take 1.5 Tabs by mouth three (3) times daily. cephALEXin (KEFLEX) 500 mg capsule   Yes Yes   Sig: Take 500 mg by mouth three (3) times daily. fish oil-omega-3 fatty acids 340-1,000 mg capsule 2/21/2018 at Unknown time  Yes Yes   Sig: Take 1 Cap by mouth daily. Indications: SUPPLEMENT      Facility-Administered Medications: None         Comments/Recommendations:   1. Removed calcium, risperidone, vitamin E  2.  Added quetiapine, glucosamine, Keflex (Started yesterday)

## 2018-02-23 NOTE — PROGRESS NOTES
TRANSFER - OUT REPORT:    Verbal report given to Joselo Romo RN(name) on [facility]arlyle Fern[facility]dez He  being transferred to [facility](unit) for routine progression of care       Report consisted of patients Situation, Background, Assessment and   Recommendations(SBAR). Information from the following report(s) SBAR was reviewed with the receiving nurse. Lines:   Peripheral IV 02/22/18 Left Antecubital (Active)   Site Assessment Clean, dry, & intact 2/23/2018  4:00 AM   Phlebitis Assessment 0 2/23/2018  4:00 AM   Infiltration Assessment 0 2/23/2018  4:00 AM   Dressing Status Clean, dry, & intact 2/23/2018  4:00 AM   Dressing Type Tape;Transparent 2/23/2018  4:00 AM   Hub Color/Line Status Pink; Infusing 2/23/2018  4:00 AM   Action Taken Blood drawn 2/22/2018  6:15 PM        Opportunity for questions and clarification was provided.       Patient transported with:   Hiri

## 2018-02-23 NOTE — PROGRESS NOTES
TRANSFER - IN REPORT:    Verbal report received from Christine Solis RN(name) on [employer] He  being received from ED(unit) for routine progression of care      Report consisted of patients Situation, Background, Assessment and   Recommendations(SBAR). Information from the following report(s) SBAR, Kardex, ED Summary, Procedure Summary, Intake/Output, MAR, Accordion, Recent Results and Cardiac Rhythm NSR was reviewed with the receiving nurse. Opportunity for questions and clarification was provided. Assessment completed upon patients arrival to unit and care assumed.

## 2018-02-24 ENCOUNTER — APPOINTMENT (OUTPATIENT)
Dept: GENERAL RADIOLOGY | Age: 73
End: 2018-02-24
Attending: HOSPITALIST
Payer: MEDICARE

## 2018-02-24 ENCOUNTER — APPOINTMENT (OUTPATIENT)
Dept: MRI IMAGING | Age: 73
End: 2018-02-24
Attending: FAMILY MEDICINE
Payer: MEDICARE

## 2018-02-24 LAB
ANION GAP SERPL CALC-SCNC: 10 MMOL/L (ref 5–15)
B PERT DNA SPEC QL NAA+PROBE: NOT DETECTED
BACTERIA SPEC CULT: NORMAL
BUN SERPL-MCNC: 17 MG/DL (ref 6–20)
BUN/CREAT SERPL: 22 (ref 12–20)
C PNEUM DNA SPEC QL NAA+PROBE: NOT DETECTED
CALCIUM SERPL-MCNC: 8.5 MG/DL (ref 8.5–10.1)
CC UR VC: NORMAL
CHLORIDE SERPL-SCNC: 107 MMOL/L (ref 97–108)
CO2 SERPL-SCNC: 22 MMOL/L (ref 21–32)
CREAT SERPL-MCNC: 0.78 MG/DL (ref 0.55–1.02)
FLUAV H1 2009 PAND RNA SPEC QL NAA+PROBE: NOT DETECTED
FLUAV H1 RNA SPEC QL NAA+PROBE: NOT DETECTED
FLUAV H3 RNA SPEC QL NAA+PROBE: NOT DETECTED
FLUAV SUBTYP SPEC NAA+PROBE: NOT DETECTED
FLUBV RNA SPEC QL NAA+PROBE: NOT DETECTED
GLUCOSE SERPL-MCNC: 74 MG/DL (ref 65–100)
HADV DNA SPEC QL NAA+PROBE: NOT DETECTED
HCOV 229E RNA SPEC QL NAA+PROBE: NOT DETECTED
HCOV HKU1 RNA SPEC QL NAA+PROBE: NOT DETECTED
HCOV NL63 RNA SPEC QL NAA+PROBE: NOT DETECTED
HCOV OC43 RNA SPEC QL NAA+PROBE: NOT DETECTED
HMPV RNA SPEC QL NAA+PROBE: NOT DETECTED
HPIV1 RNA SPEC QL NAA+PROBE: NOT DETECTED
HPIV2 RNA SPEC QL NAA+PROBE: NOT DETECTED
HPIV3 RNA SPEC QL NAA+PROBE: NOT DETECTED
HPIV4 RNA SPEC QL NAA+PROBE: NOT DETECTED
M PNEUMO DNA SPEC QL NAA+PROBE: NOT DETECTED
POTASSIUM SERPL-SCNC: 3.8 MMOL/L (ref 3.5–5.1)
RSV RNA SPEC QL NAA+PROBE: NOT DETECTED
RV+EV RNA SPEC QL NAA+PROBE: NOT DETECTED
SERVICE CMNT-IMP: NORMAL
SODIUM SERPL-SCNC: 139 MMOL/L (ref 136–145)

## 2018-02-24 PROCEDURE — 36415 COLL VENOUS BLD VENIPUNCTURE: CPT | Performed by: HOSPITALIST

## 2018-02-24 PROCEDURE — 97161 PT EVAL LOW COMPLEX 20 MIN: CPT

## 2018-02-24 PROCEDURE — 74011250636 HC RX REV CODE- 250/636: Performed by: HOSPITALIST

## 2018-02-24 PROCEDURE — 70551 MRI BRAIN STEM W/O DYE: CPT

## 2018-02-24 PROCEDURE — 87633 RESP VIRUS 12-25 TARGETS: CPT | Performed by: HOSPITALIST

## 2018-02-24 PROCEDURE — 71045 X-RAY EXAM CHEST 1 VIEW: CPT

## 2018-02-24 PROCEDURE — 74011250637 HC RX REV CODE- 250/637: Performed by: HOSPITALIST

## 2018-02-24 PROCEDURE — G8978 MOBILITY CURRENT STATUS: HCPCS

## 2018-02-24 PROCEDURE — 97530 THERAPEUTIC ACTIVITIES: CPT

## 2018-02-24 PROCEDURE — 99218 HC RM OBSERVATION: CPT

## 2018-02-24 PROCEDURE — 80048 BASIC METABOLIC PNL TOTAL CA: CPT | Performed by: HOSPITALIST

## 2018-02-24 PROCEDURE — 96361 HYDRATE IV INFUSION ADD-ON: CPT

## 2018-02-24 PROCEDURE — G8979 MOBILITY GOAL STATUS: HCPCS

## 2018-02-24 RX ORDER — ACETAMINOPHEN 325 MG/1
650 TABLET ORAL
Status: DISCONTINUED | OUTPATIENT
Start: 2018-02-24 | End: 2018-02-25 | Stop reason: HOSPADM

## 2018-02-24 RX ADMIN — Medication 10 ML: at 21:53

## 2018-02-24 RX ADMIN — CARBIDOPA AND LEVODOPA 1.5 TABLET: 25; 100 TABLET, ORALLY DISINTEGRATING ORAL at 10:09

## 2018-02-24 RX ADMIN — SODIUM CHLORIDE 50 ML/HR: 900 INJECTION, SOLUTION INTRAVENOUS at 19:36

## 2018-02-24 RX ADMIN — Medication 10 ML: at 06:13

## 2018-02-24 RX ADMIN — Medication 10 ML: at 14:36

## 2018-02-24 RX ADMIN — CARBIDOPA AND LEVODOPA 1.5 TABLET: 25; 100 TABLET, ORALLY DISINTEGRATING ORAL at 15:48

## 2018-02-24 RX ADMIN — CARBIDOPA AND LEVODOPA 1.5 TABLET: 25; 100 TABLET, ORALLY DISINTEGRATING ORAL at 21:53

## 2018-02-24 NOTE — PROGRESS NOTES
Bedside shift change report given to Martin Espinosa (oncoming nurse) by Donnie Live (offgoing nurse). Report included the following information SBAR, Kardex, Intake/Output, MAR, Accordion, Recent Results, Med Rec Status and Alarm Parameters .
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
## 2018-02-24 NOTE — PROGRESS NOTES
Problem: Falls - Risk of  Goal: *Absence of Falls  Document Genoveva Fall Risk and appropriate interventions in the flowsheet.    Outcome: Progressing Towards Goal  Fall Risk Interventions:       Mentation Interventions: Adequate sleep, hydration, pain control, Bed/chair exit alarm, Door open when patient unattended, Evaluate medications/consider consulting pharmacy, Increase mobility, More frequent rounding, Reorient patient, Room close to nurse's station, Toileting rounds, Update white board         Elimination Interventions: Call light in reach, Elevated toilet seat, Bed/chair exit alarm, Patient to call for help with toileting needs, Toilet paper/wipes in reach, Toileting schedule/hourly rounds

## 2018-02-24 NOTE — INTERDISCIPLINARY ROUNDS
IDR/SLIDR Summary          Patient: Vishal Hopper MRN: [id number]    Age: 67 y.o. YOB: 1945 Room/Bed: [facility]   Admit Diagnosis: Altered mental status  Principal Diagnosis: Altered mental status   Goals: Safety, MRI  Readmission: NO  Quality Measure: Not applicable  VTE Prophylaxis: Mechanical  Influenza Vaccine screening completed? YES  Pneumococcal Vaccine screening completed? NO  Mobility needs: Yes   Nutrition plan:Yes  Consults:    Financial concerns:No  Escalated to CM? NO  RRAT Score: 13   Interventions:H2H  Testing due for pt today? YES  LOS: 0 days Expected length of stay 1-2 days  Discharge plan: TBD   PCP: Carolynn Coffey MD  Transportation needs: No    Days before discharge:two or more days before discharge   Discharge disposition: Home?     Signed:     Janis Fernández  2/23/2018  8:55 PM

## 2018-02-24 NOTE — PROGRESS NOTES
Catalina Pearson MD   Phone/text: [phone] (7am to 7pm)  After 7pm please call  for hospitalist on call    Hospitalist Progress Note     2/24/2018   PCP:  Dr. Luis Cassidy MD  Chief Complaint   Patient presents with    Altered mental status       Admission Summary:   The patient is a 58-year-old female with past medical history of dementia with agitation, history of recurrent UTIs, history of Parkinson's disease who presents to the hospital accompanied by her daughter, , and a family friend. Patient has dementia and is somnolent and not much history could be obtained from the patient. History was obtained from the daughter and her friend with translation from the  who does not speak any [place]. The daughter reports that the patient has a history of dementia and history of Parkinson disease, has had a slow decline. She walks with a walker, usually is not very interactive and does not interact with her family. She is able to eat, drink and watch TV on a regular basis, but for the past 2 days, they have noticed that the patient has not been \"doing so good. \"  The daughter reports that the patient has been more lethargic, has been sleeping in bed, not opening her eyes and this morning was found to be very cold. The daughter reports that the patient was \"leaking urine yesterday. \"     Reason For Visit:  AMS    Assessment/Plan   Acute metabolic encephalopathy on chronic dementia (POA) - unclear cause  - CT head 2/22 with small vessel ischemic changes in the periventricular white matter, no acute process  - CTA head 2/22 without acute abnormality  - MRI brain 2/24 with diffuse cortical atrophy, chronic microvascular ischemic changes, no acute process  - B12, ammonia, TSH wnl  - Re-orient as able    Fever - occurred 2/24 but no signs of infection, atelectasis?   - CT chest 2/23 without PE, R>L atelectasis, no focal airspace disease  - Bcx 2/22 no growth  - UA unremarkable  - Flu swab negative  - Check viral PCR    Dehydration (POA) - improved  - Continue IV fluids    Recent fall - no signs of injury  - CK near normal  - CT head as above  - CT neck  with spondylitic changes without acute abnormality  - PT/OT as able     History of Parkinson disease - stable  - Continue parcopa    Dementia (chronic)  - Hold seroquel for now, add back as needed    See orders for other plans. VTE prophylaxis: SCDs  Discussed plan of care with Patient/Family, Nurse and    Pre-admission lived at home  Discharge plan: home  Estimated time to discharge: likely discharge tomorrow     Subjective   Ms. Kimberly Edmond He is awake and interactive. Reviewed interval history  Physical examination     Visit Vitals    /79 (BP 1 Location: Right arm, BP Patient Position: At rest)    Pulse (!) 108    Temp 100.3 °F (37.9 °C)    Resp 22    Wt 53.8 kg (118 lb 9.4 oz)    SpO2 98%    BMI 23.16 kg/m2       Temp (24hrs), Av °F (37.2 °C), Min:97.6 °F (36.4 °C), Max:100.3 °F (37.9 °C)    No intake or output data in the 24 hours ending 18 1039    Gen - NAD  HEENT - MMM  Neck - supple, no thyromegaly  CV - RRR, no MRG  Resp - lungs CTA b/l, no wheezing, normal WOB  GI - abdomen S, NT, ND, +BS. No hepatosplenomegaly   - no CVA tenderness, bladder non-palpable in lower abdomen  MSK - increased tone and decrease bulk, no edema  Neuro - Alert, follows commands, moving all 4 extremities without focal deficits. Data Review       Telemetry x   ECG    Xray    CT scan    MRI x   Echocardiogram    Ultrasound              I have reviewed the flow sheet and recent notes  New labs and data personally reviewed.     Recent Labs      18   1835   WBC  6.4   HGB  12.5   HCT  37.1   PLT  206     Recent Labs      18   0213  18   0458  18   2222  18   1835  18   1803   NA  139  141   --   141   --    K  3.8  3.9   --   4.0   --    CL  107  109*   --   106   -- CO2  22  25   --   29   --    GLU  74  82   --   83   --    BUN  17  13   --   18   --    CREA  0.78  0.76   --   0.88   --    CA  8.5  8.2*   --   8.2*   --    MG   --    --   2.1   --    --    PHOS   --    --   2.9   --    --    ALB   --   3.1*   --   3.1*   --    TBILI   --   1.0   --   1.0   --    SGOT   --   26   --   23   --    ALT   --   21   --   15   --    INR   --    --    --    --   1.0       Medications reviewed  Current Facility-Administered Medications   Medication Dose Route Frequency    acetaminophen (TYLENOL) tablet 650 mg  650 mg Oral Q4H PRN    carbidopa-levodopa (PARCOPA)  mg rapid dissolve tablet 1.5 Tab  1.5 Tab Oral TID    influenza vaccine 2017-18 (3 yrs+)(PF) (FLUZONE QUAD/FLUARIX QUAD) injection 0.5 mL  0.5 mL IntraMUSCular PRIOR TO DISCHARGE    sodium chloride (NS) flush 5-10 mL  5-10 mL IntraVENous Q8H    sodium chloride (NS) flush 5-10 mL  5-10 mL IntraVENous PRN    0.9% sodium chloride infusion  50 mL/hr IntraVENous CONTINUOUS    ondansetron (ZOFRAN) injection 4 mg  4 mg IntraVENous Q4H PRN         Denis Jerry MD  Internal Medicine  2/24/2018

## 2018-02-24 NOTE — PROGRESS NOTES
6:51 AM  Called Dr. Allegra Johnson concerning pt elevated temperature of 100.1, pt currently has no medications for elevated temperature. Dr. Allegra Johnson states this \"is not a fever\" and is not ordering any medications at this time, continue to monitor the pt for changes. MEWs populating at 3 because of HR and temperature.

## 2018-02-24 NOTE — ROUTINE PROCESS
Bedside shift change report given to [facility], RN (oncoming nurse) by Reid Aden RN (offgoing nurse).  Report included the following information SBAR, Kardex, ED Summary, Procedure Summary, Intake/Output, MAR, Accordion, Recent Results and Cardiac Rhythm ST.

## 2018-02-24 NOTE — PROGRESS NOTES
Problem: Falls - Risk of  Goal: *Absence of Falls  Document Genoveva Fall Risk and appropriate interventions in the flowsheet.    Outcome: Progressing Towards Goal  Fall Risk Interventions:       Mentation Interventions: Adequate sleep, hydration, pain control         Elimination Interventions: Call light in reach

## 2018-02-24 NOTE — ROUTINE PROCESS
Bedside shift change report given to Myron Dang RN (oncoming nurse) by Matthew Live RN (offgoing nurse). Report included the following information SBAR, Kardex, ED Summary, Procedure Summary, Intake/Output, MAR, Accordion, Recent Results and Cardiac Rhythm NSR/ST.

## 2018-02-25 VITALS
WEIGHT: 118.61 LBS | TEMPERATURE: 98.7 F | OXYGEN SATURATION: 96 % | BODY MASS INDEX: 23.16 KG/M2 | DIASTOLIC BLOOD PRESSURE: 60 MMHG | HEART RATE: 82 BPM | SYSTOLIC BLOOD PRESSURE: 121 MMHG | RESPIRATION RATE: 20 BRPM

## 2018-02-25 PROCEDURE — 90686 IIV4 VACC NO PRSV 0.5 ML IM: CPT | Performed by: FAMILY MEDICINE

## 2018-02-25 PROCEDURE — 90471 IMMUNIZATION ADMIN: CPT

## 2018-02-25 PROCEDURE — G8988 SELF CARE GOAL STATUS: HCPCS

## 2018-02-25 PROCEDURE — 74011250636 HC RX REV CODE- 250/636: Performed by: FAMILY MEDICINE

## 2018-02-25 PROCEDURE — G8989 SELF CARE D/C STATUS: HCPCS

## 2018-02-25 PROCEDURE — G8987 SELF CARE CURRENT STATUS: HCPCS

## 2018-02-25 PROCEDURE — 74011250637 HC RX REV CODE- 250/637: Performed by: HOSPITALIST

## 2018-02-25 PROCEDURE — 97166 OT EVAL MOD COMPLEX 45 MIN: CPT

## 2018-02-25 PROCEDURE — 97535 SELF CARE MNGMENT TRAINING: CPT

## 2018-02-25 PROCEDURE — 99218 HC RM OBSERVATION: CPT

## 2018-02-25 RX ORDER — TRAZODONE HYDROCHLORIDE 50 MG/1
50 TABLET ORAL
Qty: 30 TAB | Refills: 2 | Status: SHIPPED | OUTPATIENT
Start: 2018-02-25 | End: 2018-04-05

## 2018-02-25 RX ADMIN — Medication 10 ML: at 05:48

## 2018-02-25 RX ADMIN — CARBIDOPA AND LEVODOPA 1.5 TABLET: 25; 100 TABLET, ORALLY DISINTEGRATING ORAL at 08:55

## 2018-02-25 RX ADMIN — INFLUENZA VIRUS VACCINE 0.5 ML: 15; 15; 15; 15 SUSPENSION INTRAMUSCULAR at 13:49

## 2018-02-25 NOTE — CONSULTS
NEUROLOGY  2/25/2018     Consulted by: Ruma Cornejo*        Patient ID:  Lester Ann   [id number]  67 y.o.  1945    Chief Complaint   Patient presents with    Altered mental status       HPI    69-year-old woman, with dementia, Chinese speaking, here in the hospital for increasing lethargy in the past couple days. She has advanced dementia and requires full-time care by family. Family is not present currently. I reviewed the medical record. Apparently she has Parkinson's but is not followed by any neurologist.  She has been receiving Seroquel as needed up to 3 times a day for behavioral management. According to nursing, today she seems more alert. She does like to play \"possum. \"  Head CT was benign with chronic ischemic changes. MRI was done showing diffuse atrophy but no acute process. She had been febrile and she is flu negative. Review the electronic medical record shows that she is followed by psychiatry. Review of Systems   Unable to perform ROS: Dementia       Past Medical History:   Diagnosis Date    History of bladder infections     Osteopenia      History reviewed. No pertinent family history. Social History     Social History    Marital status:      Spouse name: N/A    Number of children: N/A    Years of education: N/A     Occupational History    Not on file. Social History Main Topics    Smoking status: Never Smoker    Smokeless tobacco: Never Used    Alcohol use No    Drug use: No    Sexual activity: Not on file     Other Topics Concern    Not on file     Social History Narrative    66 year vold  female admitted voluntarily for dementia. Pt lives with her daughter, who reports pt. Has been more confused, wandering and occasionally aggressive in the last 3 weeks. Her internist just changed the pt from haldol to zyprexa because the pt complained of sedation. Pt is to return home to her daughter after treatment.  She is able to speak and understand limited English. Current Facility-Administered Medications   Medication Dose Route Frequency    acetaminophen (TYLENOL) tablet 650 mg  650 mg Oral Q4H PRN    carbidopa-levodopa (PARCOPA)  mg rapid dissolve tablet 1.5 Tab  1.5 Tab Oral TID    influenza vaccine 2017-18 (3 yrs+)(PF) (FLUZONE QUAD/FLUARIX QUAD) injection 0.5 mL  0.5 mL IntraMUSCular PRIOR TO DISCHARGE    sodium chloride (NS) flush 5-10 mL  5-10 mL IntraVENous Q8H    sodium chloride (NS) flush 5-10 mL  5-10 mL IntraVENous PRN    0.9% sodium chloride infusion  50 mL/hr IntraVENous CONTINUOUS    ondansetron (ZOFRAN) injection 4 mg  4 mg IntraVENous Q4H PRN     No Known Allergies    Visit Vitals    /60 (BP 1 Location: Right arm, BP Patient Position: At rest)    Pulse 82    Temp 98.7 °F (37.1 °C)    Resp 20    Wt 53.8 kg (118 lb 9.7 oz)    SpO2 96%    BMI 23.16 kg/m2     Physical Exam   Constitutional: She appears well-developed and well-nourished. Cardiovascular: Normal rate. Pulmonary/Chest: Effort normal.   Skin: Skin is warm and dry. Psychiatric: Her behavior is normal.   Vitals reviewed. Neurologic Exam     Mental Status        Elderly woman in bed. Wakes to voice. She is nonverbal for me. She can mimic commands but she cannot understand what I asked her to do. Her face appears symmetric. Slightly reduced blink rate. Masklike face. She can activate smile bilaterally appearing symmetric. Tongue appears midline  Pupils equal she can lift both arms above her head symmetrically. No drift observed. She cannot lift her legs on command due to language impairment I suspect. No atrophy seen. Gait deferred due to poor patient comprehension.               Lab Results  Component Value Date/Time   WBC 6.4 02/22/2018 06:35 PM   HGB 12.5 02/22/2018 06:35 PM   HCT 37.1 02/22/2018 06:35 PM   PLATELET 753 19/93/9191 06:35 PM   MCV 93.7 02/22/2018 06:35 PM     Lab Results  Component Value Date/Time   Glucose 74 02/24/2018 02:13 AM   Glucose 87 04/12/2017 06:14 AM   Glucose (POC) 79 02/22/2018 06:27 PM   LDL, calculated 59.4 02/22/2018 10:22 PM   Creatinine 0.78 02/24/2018 02:13 AM      Lab Results  Component Value Date/Time   Cholesterol, total 137 02/22/2018 10:22 PM   HDL Cholesterol 64 02/22/2018 10:22 PM   LDL, calculated 59.4 02/22/2018 10:22 PM   Triglyceride 68 02/22/2018 10:22 PM   CHOL/HDL Ratio 2.1 02/22/2018 10:22 PM     Lab Results  Component Value Date/Time   ALT (SGPT) 21 02/23/2018 04:58 AM   AST (SGOT) 26 02/23/2018 04:58 AM   Alk. phosphatase 61 02/23/2018 04:58 AM   Bilirubin, total 1.0 02/23/2018 04:58 AM   Albumin 3.1 (L) 02/23/2018 04:58 AM   Protein, total 7.1 02/23/2018 04:58 AM   Ammonia 19 02/22/2018 10:22 PM   INR (POC) 1.0 02/22/2018 06:03 PM   PLATELET 684 90/80/0210 06:35 PM          CT Results (maximum last 3): Results from [place] encounter on 02/22/18   CTA CHEST W OR W WO CONT   Narrative INDICATION:  Hypoxia, assess for pulmonary embolus. COMPARISON:  No old study. TECHNIQUE:  Noncontrast images were obtained to localize the pulmonary arteries. Contiguous axial CT images were then obtained of the chest with 75 mL of  Isovue-370 contrast using the pulmonary angio protocol. 2D post processing was utilized, and coronal and sagittal reformatted images  were obtained. In addition 3D post processing was utilized and MIP coronal and  sagittal reconstructed images were obtained. CT dose reduction was achieved through the use of a standardized protocol  tailored for this examination and automatic exposure control for dose  modulation. FINDINGS:    No pulmonary embolus is detected. The thoracic aorta is normal in caliber. No dissecting aneurysm is seen. Greater on the right dependent atelectasis is seen. There is no pericardial effusion or pleural effusion. There is no focal airspace disease. Degenerative change of the spine is noted.            Impression IMPRESSION:  No pulmonary embolus detected. No aneurysm detected            CT SPINE CERV WO CONT   Narrative EXAM:  CT CERVICAL SPINE WITHOUT CONTRAST    INDICATION:   fall. Unresponsive    COMPARISON: None. CONTRAST:  None. TECHNIQUE: Multislice helical CT of the cervical spine was performed without  intravenous contrast administration. Sagittal and coronal reconstructions were  generated. CT dose reduction was achieved through use of a standardized  protocol tailored for this examination and automatic exposure control for dose  modulation. FINDINGS:    The alignment is within normal limits. There is no fracture or subluxation. The  odontoid process is intact. The craniocervical junction is within normal limits. The prevertebral soft tissues are within normal limits. Spondylosis is noted at  multiple levels, greatest at C5-6 where there is a posterior osteophyte/disc  bulge complex causing moderate spinal stenosis. Bilateral neural foraminal  narrowing is noted at C5-6 and C6-7 secondary to uncovertebral osteophyte  formation. Impression IMPRESSION:  Spondylitic changes without acute abnormality. MRI Results (maximum last 3): Results from [place] encounter on 02/22/18   MRI BRAIN WO CONT   Narrative INDICATION: Altered mental status. Exam: Multiplanar noncontrast MRI of the brain is performed with T1, T2,  gradient, FLAIR and diffusion sequences. Direct comparison is made to prior CT dated February 22, 2018. FINDINGS: There is mild diffuse cortical atrophy. There are periventricular  white matter hyperintensities consistent with chronic microvascular ischemic  changes. There is no restricted diffusion to suggest acute infarct. The  ventricular system is normal.The cervicomedullary junction is normal and there  is no tonsillar ectopia. There is no acute intracranial hemorrhage, prior  intracranial hemorrhage or extra-axial fluid collection.  The visualized vascular  flow-voids of the skull base are normal. There is no intracranial mass lesion,  mass effect or herniation. Impression IMPRESSION: No acute intracranial hemorrhage or infarct. VAS/US/Carotid Doppler Results (maximum last 3): No results found for this or any previous visit. PET Results (maximum last 3): No results found for this or any previous visit. Assessment and Plan        16-year-old woman who has a reported history of Parkinson's. Unfortunately exam is limited. She is very demented and has a hard time following commands. She can mimic some commands however. I would maintain the carbidopa she is on currently and she needs to follow-up with her neurologist.  The question of continuing Seroquel is really for symptom control purposes regarding her behavior. It seems like psychiatry has been managing that. I do think it is reasonable to discontinue Seroquel at this point until she sees psychiatry for further assistance. The alternative is to start trazodone at bedtime 25 mg.  I reviewed the MRI. No acute process neurologically. I think she is suitable for discharge once she is medically cleared. Addendum: I spoke with the daughter at the bedside. Recommend she continue Sinemet. Stop Seroquel and start trazodone. She will see Dr. Monse Robledo her neurologist soon.     [address] [facility],   NEUROLOGIST  Diplomate BEKAH  2/25/2018

## 2018-02-25 NOTE — PROGRESS NOTES
CM received consult for home health services. CM offered patient's daughter (patient has dementia) choice of providers. Patient selected [facility] and signed [place] of Choice form which CM placed on chart. CM sent referral via [employer] to [facility], and referral was accepted. CM updated AVS and informed staff nurse.

## 2018-02-25 NOTE — DISCHARGE INSTRUCTIONS
Please bring this form with you to show your primary care provider at your follow-up appointment. Primary care provider:  Dr. Jenniffer Comer MD    Discharging provider:  Dakota Mejía MD    You have been admitted to the hospital with the following diagnoses: Altered mental status    FOLLOW-UP CARE RECOMMENDATIONS:    APPOINTMENTS:  Follow-up Information     Follow up With Details Comments Contact Info    Jenniffer Comer MD Schedule an appointment as soon as possible for a visit in 1 week For follow up after hospitalization [facility] [address] [facility]      Lobo Veloz NP Schedule an appointment as soon as possible for a visit in 1 week For follow up of dementia and behavioral difficulties [address]   [facility], [address] [facility]  [phone]    Erna Krabbe, MD Schedule an appointment as soon as possible for a visit in 2 weeks For follow up of Parkinson's disease [address] [place] [address] 300  Neurological [address] [facility] [phone]           SYMPTOMS to watch for: fever, chills, nausea, vomiting, diarrhea, falling, weakness. DIET/what to eat:  Regular Diet    ACTIVITY:  Activity as tolerated    What to do if new or unexpected symptoms occur? If you experience any of the above symptoms (or should other concerns or questions arise after discharge) please call your primary care physician. Return to the emergency room if you cannot get hold of your doctor. · It is very important that you keep your follow-up appointment(s). · Please bring discharge papers, medication list (and/or medication bottles) to your follow-up appointments for review by your outpatient provider(s). · Please check the list of medications and be sure it includes every medication (even non-prescription medications) that your provider wants you to take. · It is important that you take the medication exactly as they are prescribed.    · Keep your medication in the bottles provided by the pharmacist and keep a list of the medication names, dosages, and times to be taken in your wallet. · Do not take other medications without consulting your doctor. · If you have any questions about your medications or other instructions, please talk to your nurse or care provider before you leave the hospital.    I understand that if any problems occur once I am at home I am to contact my physician. These instructions were explained to me and I had the opportunity to ask questions.

## 2018-02-25 NOTE — PROGRESS NOTES
Bedside RN performed patient education and medication education. Discharge concerns initiated and discussed with patient, including clarification on \"who\" assists the patient at their home and instructions for when the home going patient should call their provider after discharge. Opportunity for questions and clarification was provided. Patient receptive to education: NO  Patient stated: Discharge paperwork discussed with POA  Barriers to Education: Patient has dementia at baseline  Diagnosis Education given:  NO    Length of stay: 0  Expected Day of Discharge: 0  Ask if they have \"Help at Home\" & add to white board?   YES    Education Day #: 0    Medication Education Given:  YES  M in the box Medication name: Trazodone    Pt aware of [facility] survey: YES

## 2018-02-25 NOTE — DISCHARGE SUMMARY
Discharge Summary     Patient:  Edith Hopper       MRN: [id number]       YOB: 1945       Age: 67 y.o. Date of admission:  2/22/2018    Date of discharge:  2/25/2018    Primary care provider: Dr. Mely Molina MD    Admitting provider:  Manuel Almaraz MD    Discharging provider:  Sachin Alfred [address].: [phone]. If unavailable, call [phone] and ask the  to page the triage hospitalist.    Consultations  IP CONSULT TO HOSPITALIST  IP CONSULT TO NEUROLOGY    Procedures  * No surgery found *    Discharge destination: Home. The patient is stable for discharge. Admission diagnosis  Altered mental status    Current Discharge Medication List      START taking these medications    Details   traZODone (DESYREL) 50 mg tablet Take 1 Tab by mouth nightly. Qty: 30 Tab, Refills: 2         CONTINUE these medications which have NOT CHANGED    Details   GLUCOSAMINE HCL PO Take 1 Tab by mouth daily. carbidopa-levodopa (SINEMET)  mg per tablet Take 1.5 Tabs by mouth three (3) times daily. fish oil-omega-3 fatty acids 340-1,000 mg capsule Take 1 Cap by mouth daily. Indications: SUPPLEMENT         STOP taking these medications       QUEtiapine (SEROQUEL) 25 mg tablet Comments:   Reason for Stopping:         cephALEXin (KEFLEX) 500 mg capsule Comments:   Reason for Stopping:                Follow-up Information     Follow up With Details Comments Contact Info    Mely Molina MD Call For follow up after hospitalization, As needed for primary care [address]      Jacqui Johansen NP Schedule an appointment as soon as possible for a visit in 1 week For follow up of dementia and behavioral difficulties Seth Tripathi [address] [place] [address]  [phone]    Roberta Flood MD Schedule an appointment as soon as possible for a visit in 2 weeks For follow up of Parkinson's disease [address]    [facility]  [phone]            Final discharge diagnoses and brief hospital course  Please also refer to the admission H&P for details on the presenting problem. The patient is a 67-year-old female with past medical history of dementia with agitation, history of recurrent UTIs, history of Parkinson's disease who presents to the hospital accompanied by her daughter, , and a family friend. Norman Walton has dementia and is somnolent and not much history could be obtained from the patient. Hemanth Pathak was obtained from the daughter and her friend with translation from the  who does not speak any [place].  The daughter reports that the patient has a history of dementia and history of Parkinson disease, has had a slow decline.  She walks with a walker, usually is not very interactive and does not interact with her family. Tessie Coates is able to eat, drink and watch TV on a regular basis, but for the past 2 days, they have noticed that the patient has not been \"doing so good. \"  The daughter reports that the patient has been more lethargic, has been sleeping in bed, not opening her eyes and this morning was found to be very cold.  The daughter reports that the patient was \"leaking urine yesterday. \"     Acute metabolic encephalopathy on chronic dementia (POA) - unclear cause  - CT head 2/22 with small vessel ischemic changes in the periventricular white matter, no acute process  - CTA head 2/22 without acute abnormality  - MRI brain 2/24 with diffuse cortical atrophy, chronic microvascular ischemic changes, no acute process  - B12, ammonia, TSH wnl  - Re-orient as able  - Follow up with PCP - Ms. Tiffany Marieo He seems to be declining rapidly, I would recommend a conversation about expectations and goals of care     Fever - occurred 2/24 but no signs of infection, atelectasis? Resolved  - CT chest 2/23 without PE, R>L atelectasis, no focal airspace disease  - Bcx 2/22 no growth  - UA unremarkable  - Flu swab negative  - Respiratory viral PCR negative     Dehydration (POA) - improved  - Continue IV fluids  - Encourage PO fluids     Recent fall - no signs of injury  - CK near normal  - CT head as above  - CT neck 2/22 with spondylitic changes without acute abnormality  - PT/OT as able      History of Parkinson disease - stable  - Continue sinemet  - Neurology consulted - follow up with Dr. Walt Soliz     Dementia (chronic)  - Hold seroquel, I think this could be contributing to her falls  - Start trazodone  - Follow up with Ms. Grayson Me in psychiatry    FOLLOW-UP CARE RECOMMENDATIONS:     SYMPTOMS to watch for: fever, chills, nausea, vomiting, diarrhea, falling, weakness. DIET/what to eat:  Regular Diet    ACTIVITY:  Activity as tolerated    What to do if new or unexpected symptoms occur? If you experience any of the above symptoms (or should other concerns or questions arise after discharge) please call your primary care physician. Return to the emergency room if you cannot get hold of your doctor. · It is very important that you keep your follow-up appointment(s). · Please bring discharge papers, medication list (and/or medication bottles) to your follow-up appointments for review by your outpatient provider(s). · Please check the list of medications and be sure it includes every medication (even non-prescription medications) that your provider wants you to take. · It is important that you take the medication exactly as they are prescribed. · Keep your medication in the bottles provided by the pharmacist and keep a list of the medication names, dosages, and times to be taken in your wallet. · Do not take other medications without consulting your doctor.    · If you have any questions about your medications or other instructions, please talk to your nurse or care provider before you leave the hospital.    Physical examination at discharge  Visit Vitals    /60 (BP 1 Location: Right arm, BP Patient Position: At rest)    Pulse 82    Temp 98.7 °F (37.1 °C)    Resp 20    Wt 53.8 kg (118 lb 9.7 oz)    SpO2 96%    BMI 23.16 kg/m2     Gen - NAD  HEENT - MMM  Neck - supple, no thyromegaly  CV - RRR, no MRG  Resp - lungs CTA b/l, no wheezing, normal WOB  GI - abdomen S, NT, ND, +BS. No hepatosplenomegaly   - no CVA tenderness, bladder non-palpable in lower abdomen  MSK - increased tone and decrease bulk, no edema  Neuro - Alert, follows commands, moving all 4 extremities without focal deficits. Pertinent imaging studies:    CT head 2/22/18  FINDINGS:  The ventricles and sulci are normal in size, shape and configuration and  midline. Mild small vessel ischemic changes are seen in the periventricular  white matter. There is no intracranial hemorrhage, extra-axial collection, mass,  mass effect or midline shift. The basilar cisterns are open. No acute infarct  is identified. The bone windows demonstrate no abnormalities. The visualized  portions of the paranasal sinuses and mastoid air cells are clear.     IMPRESSION  IMPRESSION: Mild small vessel ischemic changes periventricular white matter. No  acute process or change compared to the prior exam.    CTA head/neck 2/22/18  FINDINGS:     CTA NECK  There is conventional three vessel arch anatomy. The bilateral subclavian,  common carotid, and internal carotid arteries are patent with no flow-limiting  stenosis.     % of right carotid artery stenosis: 0%  % of left carotid artery stenosis: 0%     NASCET method was utilized for calculating stenosis.      The vertebral arteries are codominant and patent. The cervical soft tissues are  unremarkable. There are degenerative changes of the cervical spine.     CTA HEAD  The vertebral arteries are codominant.  The basilar artery and its branches are  normal. The internal carotid, anterior cerebral, and middle cerebral arteries  are patent. There is no flow-limiting intracranial stenosis. There is no  aneurysm. There are no sizable posterior communicating arteries.     IMPRESSION  IMPRESSION:  No acute abnormality is identified. .    CXR 2/22/18  Portable exam of the chest obtained at 1842 demonstrates normal heart size. There is no acute process in the lung fields. The osseous structures are  unremarkable.     IMPRESSION  Impression: No acute process. CT C-spine 2/22/18  FINDINGS:     The alignment is within normal limits. There is no fracture or subluxation. The  odontoid process is intact. The craniocervical junction is within normal limits. The prevertebral soft tissues are within normal limits. Spondylosis is noted at  multiple levels, greatest at C5-6 where there is a posterior osteophyte/disc  bulge complex causing moderate spinal stenosis. Bilateral neural foraminal  narrowing is noted at C5-6 and C6-7 secondary to uncovertebral osteophyte  formation.     IMPRESSION  IMPRESSION:  Spondylitic changes without acute abnormality. CTA chest 2/23/18  FINDINGS:    No pulmonary embolus is detected. The thoracic aorta is normal in caliber. No dissecting aneurysm is seen. Greater on the right dependent atelectasis is seen. There is no pericardial effusion or pleural effusion. There is no focal airspace disease. Degenerative change of the spine is noted.        IMPRESSION  IMPRESSION:  No pulmonary embolus detected.     No aneurysm detected    CXR 2/24/18  Cardiomediastinal silhouette is stable. There is mild left basilar atelectasis. There is blunting of the left cost phrenic angle possibly representing a small  left pleural effusion. Right lung is clear.     IMPRESSION  IMPRESSION: Mild left basilar atelectasis. Possible small left pleural effusion. MRI brain 2/24/18  FINDINGS: There is mild diffuse cortical atrophy.  There are periventricular  white matter hyperintensities consistent with chronic microvascular ischemic  changes. There is no restricted diffusion to suggest acute infarct. The  ventricular system is normal.The cervicomedullary junction is normal and there  is no tonsillar ectopia. There is no acute intracranial hemorrhage, prior  intracranial hemorrhage or extra-axial fluid collection. The visualized vascular  flow-voids of the skull base are normal. There is no intracranial mass lesion,  mass effect or herniation.     IMPRESSION  IMPRESSION: No acute intracranial hemorrhage or infarct. Recent Labs      02/22/18   1835   WBC  6.4   HGB  12.5   HCT  37.1   PLT  206     Recent Labs      02/24/18   0213  02/23/18   0458  02/22/18 2222 02/22/18   1835   NA  139  141   --   141   K  3.8  3.9   --   4.0   CL  107  109*   --   106   CO2  22  25   --   29   BUN  17  13   --   18   CREA  0.78  0.76   --   0.88   GLU  74  82   --   83   CA  8.5  8.2*   --   8.2*   MG   --    --   2.1   --    PHOS   --    --   2.9   --      Recent Labs      02/23/18   0458  02/22/18   1835   SGOT  26  23   AP  61  56   TP  7.1  6.9   ALB  3.1*  3.1*   GLOB  4.0  3.8     Recent Labs      02/22/18   1803   INR  1.0      No results for input(s): FE, TIBC, PSAT, FERR in the last 72 hours. No results for input(s): PH, PCO2, PO2 in the last 72 hours.   Recent Labs      02/23/18   0458  02/22/18 2222 02/22/18   1835   CPK  212*  220*   --    CKMB   --    --   <1.0     No components found for: [place]    Chronic Diagnoses:    Problem List as of 2/25/2018  Date Reviewed: 2/22/2018          Codes Class Noted - Resolved    * (Principal)Altered mental status ICD-10-CM: R41.82  ICD-9-CM: 780.97  2/22/2018 - Present        Dementia ICD-10-CM: F03.90  ICD-9-CM: 294.20  4/11/2017 - Present        Osteopenia ICD-10-CM: M85.80  ICD-9-CM: 733.90  Unknown - Present        History of bladder infections ICD-10-CM: Z87.440  ICD-9-CM: V13.02  Unknown - Present        Cataract ICD-10-CM: H26.9  ICD-9-CM: 366.9  2/9/2017 - Present        RESOLVED: Acute cystitis with hematuria ICD-10-CM: N30.01  ICD-9-CM: 595.0  4/4/2017 - 4/10/2017              Time spent on discharge related activities today greater than 30 minutes.       Signed:  Vita Maravilla MD                 Hospitalist, Internal Medicine      Cc: Wyatt Fagan MD

## 2018-02-25 NOTE — INTERDISCIPLINARY ROUNDS
IDR/SLIDR Summary          Patient: Edith Hopper MRN: [id number]    Age: 67 y.o. YOB: 1945 Room/Bed: [facility]   Admit Diagnosis: Altered mental status  Principal Diagnosis: Altered mental status   Goals: Safety  Readmission: NO  Quality Measure: Not applicable  VTE Prophylaxis: Mechanical  Influenza Vaccine screening completed? YES  Pneumococcal Vaccine screening completed? NO  Mobility needs: Yes   Nutrition plan:No  Consults:P.T, O.T. and Case Management    Financial concerns:Yes  Escalated to CM? YES  RRAT Score: 15   Interventions:H2H  Testing due for pt today? NO  LOS: 0 days Expected length of stay 1 days  Discharge plan: Home? PCP: Mely Molina MD  Transportation needs: No    Days before discharge:two or more days before discharge   Discharge disposition: Home?     Signed:     Ron Galvez  2/24/2018  10:24 PM
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
## 2018-02-25 NOTE — PROGRESS NOTES
Bedside shift change report given to Martin Espinosa (oncoming nurse) by Ana Cortez (offgoing nurse). Report included the following information SBAR, Kardex, Intake/Output, MAR, Accordion, Recent Results, Med Rec Status and Alarm Parameters .

## 2018-02-27 LAB
BACTERIA SPEC CULT: NORMAL
SERVICE CMNT-IMP: NORMAL

## 2018-03-01 ENCOUNTER — PATIENT OUTREACH (OUTPATIENT)
Dept: INTERNAL MEDICINE CLINIC | Age: 73
End: 2018-03-01

## 2018-03-01 ENCOUNTER — OFFICE VISIT (OUTPATIENT)
Dept: INTERNAL MEDICINE CLINIC | Age: 73
End: 2018-03-01

## 2018-03-01 VITALS
BODY MASS INDEX: 23.29 KG/M2 | RESPIRATION RATE: 16 BRPM | HEIGHT: 60 IN | SYSTOLIC BLOOD PRESSURE: 153 MMHG | DIASTOLIC BLOOD PRESSURE: 81 MMHG | HEART RATE: 92 BPM | OXYGEN SATURATION: 98 % | WEIGHT: 118.6 LBS | TEMPERATURE: 98 F

## 2018-03-01 DIAGNOSIS — Z87.440 HISTORY OF BLADDER INFECTIONS: ICD-10-CM

## 2018-03-01 DIAGNOSIS — G20 DEMENTIA DUE TO PARKINSON'S DISEASE WITH BEHAVIORAL DISTURBANCE (HCC): Primary | ICD-10-CM

## 2018-03-01 DIAGNOSIS — N39.41 URGE INCONTINENCE OF URINE: ICD-10-CM

## 2018-03-01 DIAGNOSIS — F02.818 DEMENTIA DUE TO PARKINSON'S DISEASE WITH BEHAVIORAL DISTURBANCE (HCC): Primary | ICD-10-CM

## 2018-03-01 DIAGNOSIS — R13.10 DYSPHAGIA, UNSPECIFIED TYPE: ICD-10-CM

## 2018-03-01 NOTE — PATIENT INSTRUCTIONS
Learning About Swallowing Problems  What are swallowing problems? Certain health problems that affect the nervous system can cause trouble swallowing. These conditions include stroke, ALS (also known as Fern Gehrig's disease), Parkinson's disease, and multiple sclerosis. The muscles and nerves that help move food through the throat and esophagus may not work right. Growths, such as cancer, and other problems with your esophagus can also make it hard to swallow. The esophagus is the tube that leads from your throat to your stomach. How are swallowing problems diagnosed? A doctor or speech therapist will examine you to check for swallowing problems. You may get swallowing tests to check how well your throat muscles work. For these tests, you swallow a special liquid that helps the doctor see your throat and esophagus on an X-ray or video screen. Other tests use a thin, flexible tube called a scope to check for problems with your esophagus. The doctor puts the scope in your mouth and down your throat to look at your esophagus. What are the symptoms? Symptoms of swallowing problems may include:  · Trouble getting food or liquids to go down on the first try. · Gagging, choking, or coughing when you swallow. · Having food or liquids come back up through your throat, mouth, or nose after you swallow. · Feeling like foods or liquids are stuck in some part of your throat or chest.  · Pain when you swallow. How are swallowing problems treated? How swallowing problems are treated depends on the cause. The main goals of treatment will be to help you eat and swallow safely and get good nutrition. This is important for your health and quality of life. You may learn exercises to train your throat muscles to work together so you're able to swallow better. Learning certain ways to put food in your mouth or to position your head while eating may also help.   Your doctor or a speech therapist may recommend changes to your diet to help make it easier to swallow. You may need to avoid certain foods or liquids. You also may need to change the thickness of foods or liquids in your diet. To eat and swallow safely, follow any instructions you get from your doctor or therapist. These ideas may help:  · Sit upright when eating, drinking, and taking pills. · Take small bites of food. Chew completely and swallow before taking another bite. · Take small sips of liquids. Hold the liquid in your mouth as you prepare to swallow. · If eating makes you tired, eat smaller but more frequent meals. · If you cough or choke, lean forward and keep your chin tipped downward while you cough. Where can you learn more? Go to http://jose juan-juancarlos.info/. Enter [id number] in the search box to learn more about \"Learning About Swallowing Problems. \"  Current as of: March 20, 2017  Content Version: 11.4  © 6298-2781 Healthwise, [employer]. Care instructions adapted under license by [employer] (which disclaims liability or warranty for this information). If you have questions about a medical condition or this instruction, always ask your healthcare professional. [place] [address] any warranty or liability for your use of this information.

## 2018-03-01 NOTE — PROGRESS NOTES
Received referral from MD    Patient was having issues with HH they cancelled a visit. HH had a staff member to have an emergency they had to attend to, Ino Lee came the next day. Explained to family that happens sometime but they will reschedule. Daughter was worried about patient's BP being elevated today. Will have home health monitor BP. If needed can get BP monitor from [address] [place] [address]. Daughter wanted to know about a dentist that they could see about dentures. Gave resource on a dentist that handles Medicaid.

## 2018-03-01 NOTE — PROGRESS NOTES
HPI   Gurdeep Baumgarten He is a 67 y.o. female, she presents today for:    Date of admission: 2/22/2018  Date of discharge: 2/25/2018  Discharge diagnosis: Altered mental status secondary to dementia/parkinsons and medications. Started on trazodone, stopped Seroquel (on this previously for aggitation, hallucination/delusion, and combativeness). - to follow-up with Rowena García NP and Quay Duverney MD  Reviewed labs: blood culture, urine culture both with  No growth. Influenza and RVP negative  MRI brain, CTA chest, CXR, CT head all without acute process    Notes ongoing increased urination. Some days she is calm somedays she is very agitated. Stopped seroquel. To see neurologist Mid March and psychiatrist in [facility] April. Daughter provides translation. Also accompanied by . Daughter is caring for her, giving her beverages to drink that she will take. Inezin is noted to cough after swallowing. Currently providing with small cut up food and encourages drinking of water between bites. Also getting care through home care delivered. Expect notes still. PMH/PSH: reviewed and updated  Sochx:  reports that she has never smoked. She has never used smokeless tobacco. She reports that she does not drink alcohol or use illicit drugs. Famhx: reviewed and updated     All: No Known Allergies  Med:   Current Outpatient Prescriptions   Medication Sig    traZODone (DESYREL) 50 mg tablet Take 1 Tab by mouth nightly.  GLUCOSAMINE HCL PO Take 1 Tab by mouth daily.  carbidopa-levodopa (SINEMET)  mg per tablet Take 1.5 Tabs by mouth three (3) times daily.  fish oil-omega-3 fatty acids 340-1,000 mg capsule Take 1 Cap by mouth daily. Indications: SUPPLEMENT     No current facility-administered medications for this visit. Review of Systems   Constitutional: Negative for fever. Respiratory: Negative for cough. Gastrointestinal: Negative for vomiting. Obtained from [address] [place] [address].  Patient unable to report for herself due to dementia    PE:  Blood pressure 153/81, pulse 92, temperature 98 °F (36.7 °C), temperature source Oral, resp. rate 16, height 5' (1.524 m), weight 118 lb 9.6 oz (53.8 kg), SpO2 98 %. Body mass index is 23.16 kg/(m^2). Physical Exam   Constitutional: She appears well-developed and well-nourished. No distress. HENT:   Head: Normocephalic. Mouth/Throat: Oropharynx is clear and moist.   Eyes: Conjunctivae and EOM are normal.   Neck: Neck supple. Cardiovascular: Normal rate, regular rhythm and normal heart sounds. Pulmonary/Chest: Effort normal and breath sounds normal. No respiratory distress. She has no wheezes. Abdominal: Soft. She exhibits no distension. Lymphadenopathy:     She has no cervical adenopathy. Neurological: She is alert. Walks with shuffling gait, en bloc turning, facies flat. Skin: Skin is warm and dry. Rash (patch of petechial rash on inner left calf and in scattered streaks on ankles bilaterally. Does not extend up to thigh nor buttocks.) noted. Psychiatric: She has a normal mood and affect. Nursing note and vitals reviewed. A/P:  67 y.o. female    ICD-10-CM ICD-9-CM    1. Dementia due to Parkinson's disease with behavioral disturbance (HCC) G20 332.0 AMB SUPPLY ORDER    F02.81 294.11 REFERRAL TO SPEECH THERAPY   2. History of bladder infections Z87.440 V13.02    3. Urge incontinence of urine N39.41 788.31 AMB SUPPLY ORDER   4. Dysphagia, unspecified type R13.10 787.20 REFERRAL TO SPEECH THERAPY     Dementia, parkinsons, behavior disturbance: appreciate assistance of neurology and psychiatry in care of patient. Continue current medications. - continue home care. With some concernfor dsyphagia will request speech therapy eval with home care to allow for further intruction. Urge incontinence of urine, requesting adult size briefs to assist with keeping clean.  No sign of bldder infection.     - She was given AVS and expressed understanding with the diagnosis and plan as discussed. Follow-up Disposition:  Return in about 6 weeks (around 4/12/2018) for follow-up referrals. Lorenzo Petit

## 2018-03-01 NOTE — MR AVS SNAPSHOT
[address] [facility] [address] 
[phone] Patient: Ethlyn Baumgarten He MRN: [id number] QHY:[birth year] Visit Information Date & Time Provider Department Dept. Phone Encounter #  
 3/1/2018  2:30 PM Guanaco Henson MD [address] [facility] [phone] Follow-up Instructions Return in about 6 weeks (around 4/12/2018) for follow-up referrals. Northridge Ferrer Upcoming Health Maintenance Date Due DTaP/Tdap/Td series (1 - Tdap) 5/25/1965 FOBT Q 1 YEAR AGE 50-75 5/25/1994 ZOSTER VACCINE AGE 60> 3/25/2004 GLAUCOMA SCREENING Q2Y 5/25/2009 MEDICARE YEARLY EXAM 4/11/2018 Pneumococcal 65+ Low/Medium Risk (2 of 2 - PPSV23) 7/12/2018 BREAST CANCER SCRN MAMMOGRAM 7/20/2019 Allergies as of 3/1/2018  Review Complete On: 3/1/2018 By: Imelda Lopes LPN No Known Allergies Current Immunizations  Reviewed on 7/12/2017 Name Date Influenza Vaccine (Quad) PF 2/25/2018  1:49 PM  
 Pneumococcal Conjugate (PCV-13) 7/12/2017  4:56 PM  
  
 Not reviewed this visit You Were Diagnosed With   
  
 Codes Comments Dementia due to Parkinson's disease with behavioral disturbance ([facility] [address]    -  Primary ICD-10-CM: G20, F02.81 ICD-9-CM: 332.0, 294.11 History of bladder infections     ICD-10-CM: Z87.440 ICD-9-CM: V13.02 Urge incontinence of urine     ICD-10-CM: N39.41 
ICD-9-CM: 788.31 Dysphagia, unspecified type     ICD-10-CM: R13.10 ICD-9-CM: 787.20 Vitals BP Pulse Temp Resp Height(growth percentile) Weight(growth percentile) 153/81 (BP 1 Location: Right arm, BP Patient Position: Sitting) 92 98 °F (36.7 °C) (Oral) 16 5' (1.524 m) 118 lb 9.6 oz (53.8 kg) SpO2 BMI OB Status Smoking Status 98% 23.16 kg/m2 Postmenopausal Never Smoker Vitals History BMI and BSA Data  Body Mass Index Body Surface Area  
 23.16 kg/m 2 1.51 m 2  
  
  
 Preferred Pharmacy Pharmacy Name Phone [id number] [place], [address] [facility] [phone] Your Updated Medication List  
  
   
This list is accurate as of 3/1/18  3:24 PM.  Always use your most recent med list.  
  
  
  
  
 carbidopa-levodopa  mg per tablet Commonly known as:  SINEMET Take 1.5 Tabs by mouth three (3) times daily. fish oil-omega-3 fatty acids 340-1,000 mg capsule Take 1 Cap by mouth daily. Indications: SUPPLEMENT  
  
 GLUCOSAMINE HCL PO Take 1 Tab by mouth daily. traZODone 50 mg tablet Commonly known as:  Kaycee Shack Take 1 Tab by mouth nightly. We Performed the Following AMB SUPPLY ORDER [[id number] Custom] Comments:  
 Please supply with adult size diaper for urinary incontinence. QS for For 8 per day. Dx code: N39.41, G20, F02.81 (Fax to Home care delivered) REFERRAL TO SPEECH THERAPY [[id number] Custom] Comments:  
 Evaluate and treat for dysphagia. Follow-up Instructions Return in about 6 weeks (around 4/12/2018) for follow-up referrals. .  
  
  
Referral Information Referral ID Referred By Referred To  
  
 [id number] Marissa Russell Not Available Visits Status Start Date End Date 1 New Request 3/1/18 3/1/19 If your referral has a status of pending review or denied, additional information will be sent to support the outcome of this decision. Patient Instructions Learning About Swallowing Problems What are swallowing problems? Certain health problems that affect the nervous system can cause trouble swallowing. These conditions include stroke, ALS (also known as Fern Gehrig's disease), Parkinson's disease, and multiple sclerosis. The muscles and nerves that help move food through the throat and esophagus may not work right.  Growths, such as cancer, and other problems with your esophagus can also make it hard to swallow. The esophagus is the tube that leads from your throat to your stomach. How are swallowing problems diagnosed? A doctor or speech therapist will examine you to check for swallowing problems. You may get swallowing tests to check how well your throat muscles work. For these tests, you swallow a special liquid that helps the doctor see your throat and esophagus on an X-ray or video screen. Other tests use a thin, flexible tube called a scope to check for problems with your esophagus. The doctor puts the scope in your mouth and down your throat to look at your esophagus. What are the symptoms? Symptoms of swallowing problems may include: · Trouble getting food or liquids to go down on the first try. · Gagging, choking, or coughing when you swallow. · Having food or liquids come back up through your throat, mouth, or nose after you swallow. · Feeling like foods or liquids are stuck in some part of your throat or chest. 
· Pain when you swallow. How are swallowing problems treated? How swallowing problems are treated depends on the cause. The main goals of treatment will be to help you eat and swallow safely and get good nutrition. This is important for your health and quality of life. You may learn exercises to train your throat muscles to work together so you're able to swallow better. Learning certain ways to put food in your mouth or to position your head while eating may also help. Your doctor or a speech therapist may recommend changes to your diet to help make it easier to swallow. You may need to avoid certain foods or liquids. You also may need to change the thickness of foods or liquids in your diet. To eat and swallow safely, follow any instructions you get from your doctor or therapist. These ideas may help: 
· Sit upright when eating, drinking, and taking pills. · Take small bites of food. Chew completely and swallow before taking another bite. · Take small sips of liquids. Hold the liquid in your mouth as you prepare to swallow. · If eating makes you tired, eat smaller but more frequent meals. · If you cough or choke, lean forward and keep your chin tipped downward while you cough. Where can you learn more? Go to http://jose juan-juancarlos.info/. Enter [id number] in the search box to learn more about \"Learning About Swallowing Problems. \" Current as of: March 20, 2017 Content Version: 11.4 © 8608-5973 Zoe Majeste. Care instructions adapted under license by [employer] (which disclaims liability or warranty for this information). If you have questions about a medical condition or this instruction, always ask your healthcare professional. [address] any warranty or liability for your use of this information. Introducing [facility]! [facility] introduces Where's Up patient portal. Now you can access parts of your medical record, email your doctor's office, and request medication refills online. 1. In your internet browser, go to [email or web address] 2. Click on the First Time User? Click Here link in the Sign In box. You will see the New Member Sign Up page. 3. Enter your Where's Up Access Code exactly as it appears below. You will not need to use this code after youve completed the sign-up process. If you do not sign up before the expiration date, you must request a new code. · Where's Up Access Code: [facility] Expires: 5/26/2018 12:26 PM 
 
4. Enter the last four digits of your Social Security Number (xxxx) and Date of Birth (mm/dd/yyyy) as indicated and click Submit. You will be taken to the next sign-up page. 5. Create a Trapeze Networkst ID. This will be your Where's Up login ID and cannot be changed, so think of one that is secure and easy to remember. 6. Create a Trapeze Networkst password. You can change your password at any time. 7. Enter your Password Reset Question and Answer. This can be used at a later time if you forget your password. 8. Enter your e-mail address. You will receive e-mail notification when new information is available in [address] 9. Click Sign Up. You can now view and download portions of your medical record. 10. Click the Download Summary menu link to download a portable copy of your medical information. If you have questions, please visit the Frequently Asked Questions section of the CHEQROOM website. Remember, CHEQROOM is NOT to be used for urgent needs. For medical emergencies, dial 911. Now available from your iPhone and Android! Please provide this summary of care documentation to your next provider. Your primary care clinician is listed as Radha Spangler. If you have any questions after today's visit, please call [phone].

## 2018-03-01 NOTE — PROGRESS NOTES
1. Have you been to the ER, urgent care clinic since your last visit? Hospitalized since your last visit? No    2. Have you seen or consulted any other health care providers outside of the [address] [place] [address] since your last visit? Include any pap smears or colon screening. No     Chief Complaint   Patient presents with   [facility] Follow Up     follow up from [facility] 2/22/18-2/25/18 for altered mental status.       Not fasting

## 2018-03-02 NOTE — PROGRESS NOTES
Spoke to [place] with [facility], patient is receiving physical therapy. Asked if they would let us know what the BP reading is on next visit.   [facility] will send last set of VS.

## 2018-03-12 ENCOUNTER — DOCUMENTATION ONLY (OUTPATIENT)
Dept: INTERNAL MEDICINE CLINIC | Age: 73
End: 2018-03-12

## 2018-03-14 ENCOUNTER — DOCUMENTATION ONLY (OUTPATIENT)
Dept: INTERNAL MEDICINE CLINIC | Age: 73
End: 2018-03-14

## 2018-03-31 ENCOUNTER — APPOINTMENT (OUTPATIENT)
Dept: GENERAL RADIOLOGY | Age: 73
DRG: 689 | End: 2018-03-31
Attending: EMERGENCY MEDICINE
Payer: MEDICARE

## 2018-03-31 ENCOUNTER — APPOINTMENT (OUTPATIENT)
Dept: CT IMAGING | Age: 73
DRG: 689 | End: 2018-03-31
Attending: EMERGENCY MEDICINE
Payer: MEDICARE

## 2018-03-31 ENCOUNTER — HOSPITAL ENCOUNTER (INPATIENT)
Age: 73
LOS: 5 days | Discharge: HOME | DRG: 689 | End: 2018-04-05
Attending: EMERGENCY MEDICINE | Admitting: HOSPITALIST
Payer: MEDICARE

## 2018-03-31 DIAGNOSIS — G20 DEMENTIA DUE TO PARKINSON'S DISEASE WITH BEHAVIORAL DISTURBANCE (HCC): ICD-10-CM

## 2018-03-31 DIAGNOSIS — R53.81 DEBILITY: ICD-10-CM

## 2018-03-31 DIAGNOSIS — R41.82 ALTERED MENTAL STATUS, UNSPECIFIED ALTERED MENTAL STATUS TYPE: ICD-10-CM

## 2018-03-31 DIAGNOSIS — Z87.440 HISTORY OF RECURRENT UTIS: ICD-10-CM

## 2018-03-31 DIAGNOSIS — Z71.89 GOALS OF CARE, COUNSELING/DISCUSSION: ICD-10-CM

## 2018-03-31 DIAGNOSIS — N39.0 URINARY TRACT INFECTION WITHOUT HEMATURIA, SITE UNSPECIFIED: Primary | ICD-10-CM

## 2018-03-31 DIAGNOSIS — F02.818 DEMENTIA DUE TO PARKINSON'S DISEASE WITH BEHAVIORAL DISTURBANCE (HCC): ICD-10-CM

## 2018-03-31 LAB
ALBUMIN SERPL-MCNC: 3.2 G/DL (ref 3.5–5)
ALBUMIN/GLOB SERPL: 0.7 {RATIO} (ref 1.1–2.2)
ALP SERPL-CCNC: 57 U/L (ref 45–117)
ALT SERPL-CCNC: 25 U/L (ref 12–78)
ANION GAP SERPL CALC-SCNC: 6 MMOL/L (ref 5–15)
APPEARANCE UR: ABNORMAL
AST SERPL-CCNC: 34 U/L (ref 15–37)
BACTERIA URNS QL MICRO: ABNORMAL /HPF
BASOPHILS # BLD: 0 K/UL (ref 0–0.1)
BASOPHILS NFR BLD: 0 % (ref 0–1)
BILIRUB SERPL-MCNC: 0.9 MG/DL (ref 0.2–1)
BILIRUB UR QL: NEGATIVE
BNP SERPL-MCNC: 207 PG/ML (ref 0–125)
BUN SERPL-MCNC: 17 MG/DL (ref 6–20)
BUN/CREAT SERPL: 18 (ref 12–20)
CALCIUM SERPL-MCNC: 8.9 MG/DL (ref 8.5–10.1)
CHLORIDE SERPL-SCNC: 104 MMOL/L (ref 97–108)
CO2 SERPL-SCNC: 26 MMOL/L (ref 21–32)
COLOR UR: ABNORMAL
CREAT SERPL-MCNC: 0.95 MG/DL (ref 0.55–1.02)
DIFFERENTIAL METHOD BLD: ABNORMAL
EOSINOPHIL # BLD: 0 K/UL (ref 0–0.4)
EOSINOPHIL NFR BLD: 0 % (ref 0–7)
EPITH CASTS URNS QL MICRO: ABNORMAL /LPF
ERYTHROCYTE [DISTWIDTH] IN BLOOD BY AUTOMATED COUNT: 12.1 % (ref 11.5–14.5)
GLOBULIN SER CALC-MCNC: 4.4 G/DL (ref 2–4)
GLUCOSE SERPL-MCNC: 102 MG/DL (ref 65–100)
GLUCOSE UR STRIP.AUTO-MCNC: NEGATIVE MG/DL
HCT VFR BLD AUTO: 38.7 % (ref 35–47)
HGB BLD-MCNC: 12.7 G/DL (ref 11.5–16)
HGB UR QL STRIP: ABNORMAL
HYALINE CASTS URNS QL MICRO: ABNORMAL /LPF (ref 0–5)
IMM GRANULOCYTES # BLD: 0 K/UL (ref 0–0.04)
IMM GRANULOCYTES NFR BLD AUTO: 0 % (ref 0–0.5)
KETONES UR QL STRIP.AUTO: NEGATIVE MG/DL
LACTATE BLD-SCNC: 0.8 MMOL/L (ref 0.4–2)
LEUKOCYTE ESTERASE UR QL STRIP.AUTO: ABNORMAL
LYMPHOCYTES # BLD: 1.3 K/UL (ref 0.8–3.5)
LYMPHOCYTES NFR BLD: 17 % (ref 12–49)
MCH RBC QN AUTO: 31.1 PG (ref 26–34)
MCHC RBC AUTO-ENTMCNC: 32.8 G/DL (ref 30–36.5)
MCV RBC AUTO: 94.9 FL (ref 80–99)
MONOCYTES # BLD: 0.4 K/UL (ref 0–1)
MONOCYTES NFR BLD: 6 % (ref 5–13)
NEUTS SEG # BLD: 5.6 K/UL (ref 1.8–8)
NEUTS SEG NFR BLD: 76 % (ref 32–75)
NITRITE UR QL STRIP.AUTO: POSITIVE
NRBC # BLD: 0 K/UL (ref 0–0.01)
NRBC BLD-RTO: 0 PER 100 WBC
PH UR STRIP: 7 [PH] (ref 5–8)
PLATELET # BLD AUTO: 245 K/UL (ref 150–400)
PMV BLD AUTO: 9.3 FL (ref 8.9–12.9)
POTASSIUM SERPL-SCNC: 4.8 MMOL/L (ref 3.5–5.1)
PROT SERPL-MCNC: 7.6 G/DL (ref 6.4–8.2)
PROT UR STRIP-MCNC: NEGATIVE MG/DL
RBC # BLD AUTO: 4.08 M/UL (ref 3.8–5.2)
RBC #/AREA URNS HPF: ABNORMAL /HPF (ref 0–5)
SODIUM SERPL-SCNC: 136 MMOL/L (ref 136–145)
SP GR UR REFRACTOMETRY: 1.01 (ref 1–1.03)
TROPONIN I SERPL-MCNC: <0.04 NG/ML
UR CULT HOLD, URHOLD: NORMAL
UROBILINOGEN UR QL STRIP.AUTO: 0.2 EU/DL (ref 0.2–1)
WBC # BLD AUTO: 7.4 K/UL (ref 3.6–11)
WBC URNS QL MICRO: >100 /HPF (ref 0–4)

## 2018-03-31 PROCEDURE — 83605 ASSAY OF LACTIC ACID: CPT

## 2018-03-31 PROCEDURE — 93005 ELECTROCARDIOGRAM TRACING: CPT

## 2018-03-31 PROCEDURE — 74011250636 HC RX REV CODE- 250/636: Performed by: EMERGENCY MEDICINE

## 2018-03-31 PROCEDURE — 70450 CT HEAD/BRAIN W/O DYE: CPT

## 2018-03-31 PROCEDURE — 65210000002 HC RM PRIVATE GYN

## 2018-03-31 PROCEDURE — 87040 BLOOD CULTURE FOR BACTERIA: CPT | Performed by: EMERGENCY MEDICINE

## 2018-03-31 PROCEDURE — 84484 ASSAY OF TROPONIN QUANT: CPT | Performed by: EMERGENCY MEDICINE

## 2018-03-31 PROCEDURE — 85025 COMPLETE CBC W/AUTO DIFF WBC: CPT | Performed by: EMERGENCY MEDICINE

## 2018-03-31 PROCEDURE — 87077 CULTURE AEROBIC IDENTIFY: CPT | Performed by: EMERGENCY MEDICINE

## 2018-03-31 PROCEDURE — 71046 X-RAY EXAM CHEST 2 VIEWS: CPT

## 2018-03-31 PROCEDURE — 87186 SC STD MICRODIL/AGAR DIL: CPT | Performed by: EMERGENCY MEDICINE

## 2018-03-31 PROCEDURE — 81001 URINALYSIS AUTO W/SCOPE: CPT | Performed by: EMERGENCY MEDICINE

## 2018-03-31 PROCEDURE — 36415 COLL VENOUS BLD VENIPUNCTURE: CPT | Performed by: EMERGENCY MEDICINE

## 2018-03-31 PROCEDURE — 87086 URINE CULTURE/COLONY COUNT: CPT | Performed by: EMERGENCY MEDICINE

## 2018-03-31 PROCEDURE — 80053 COMPREHEN METABOLIC PANEL: CPT | Performed by: EMERGENCY MEDICINE

## 2018-03-31 PROCEDURE — 99285 EMERGENCY DEPT VISIT HI MDM: CPT

## 2018-03-31 PROCEDURE — 74011000258 HC RX REV CODE- 258: Performed by: EMERGENCY MEDICINE

## 2018-03-31 PROCEDURE — 74011250636 HC RX REV CODE- 250/636: Performed by: HOSPITALIST

## 2018-03-31 PROCEDURE — 83880 ASSAY OF NATRIURETIC PEPTIDE: CPT | Performed by: EMERGENCY MEDICINE

## 2018-03-31 PROCEDURE — 96365 THER/PROPH/DIAG IV INF INIT: CPT

## 2018-03-31 PROCEDURE — 96361 HYDRATE IV INFUSION ADD-ON: CPT

## 2018-03-31 RX ORDER — LORAZEPAM 2 MG/ML
1 INJECTION INTRAMUSCULAR
Status: DISCONTINUED | OUTPATIENT
Start: 2018-03-31 | End: 2018-04-04

## 2018-03-31 RX ORDER — ENOXAPARIN SODIUM 100 MG/ML
30 INJECTION SUBCUTANEOUS EVERY 24 HOURS
Status: DISCONTINUED | OUTPATIENT
Start: 2018-03-31 | End: 2018-03-31 | Stop reason: DRUGHIGH

## 2018-03-31 RX ORDER — SODIUM CHLORIDE 0.9 % (FLUSH) 0.9 %
5-10 SYRINGE (ML) INJECTION AS NEEDED
Status: DISCONTINUED | OUTPATIENT
Start: 2018-03-31 | End: 2018-04-06 | Stop reason: HOSPADM

## 2018-03-31 RX ORDER — SODIUM CHLORIDE 0.9 % (FLUSH) 0.9 %
5-10 SYRINGE (ML) INJECTION EVERY 8 HOURS
Status: DISCONTINUED | OUTPATIENT
Start: 2018-03-31 | End: 2018-04-06 | Stop reason: HOSPADM

## 2018-03-31 RX ORDER — ENOXAPARIN SODIUM 100 MG/ML
40 INJECTION SUBCUTANEOUS EVERY 24 HOURS
Status: DISCONTINUED | OUTPATIENT
Start: 2018-03-31 | End: 2018-04-06 | Stop reason: HOSPADM

## 2018-03-31 RX ORDER — CARBIDOPA AND LEVODOPA 25; 100 MG/1; MG/1
1.5 TABLET ORAL 3 TIMES DAILY
Status: DISCONTINUED | OUTPATIENT
Start: 2018-03-31 | End: 2018-04-01

## 2018-03-31 RX ORDER — ACETAMINOPHEN 325 MG/1
650 TABLET ORAL
Status: DISCONTINUED | OUTPATIENT
Start: 2018-03-31 | End: 2018-04-06 | Stop reason: HOSPADM

## 2018-03-31 RX ORDER — SODIUM CHLORIDE 9 MG/ML
100 INJECTION, SOLUTION INTRAVENOUS CONTINUOUS
Status: DISCONTINUED | OUTPATIENT
Start: 2018-03-31 | End: 2018-04-06 | Stop reason: HOSPADM

## 2018-03-31 RX ADMIN — CEFTRIAXONE 1 G: 1 INJECTION, POWDER, FOR SOLUTION INTRAMUSCULAR; INTRAVENOUS at 17:14

## 2018-03-31 RX ADMIN — SODIUM CHLORIDE 1000 ML: 900 INJECTION, SOLUTION INTRAVENOUS at 15:01

## 2018-03-31 RX ADMIN — Medication 10 ML: at 21:24

## 2018-03-31 RX ADMIN — SODIUM CHLORIDE 100 ML/HR: 900 INJECTION, SOLUTION INTRAVENOUS at 20:28

## 2018-03-31 RX ADMIN — ENOXAPARIN SODIUM 40 MG: 40 INJECTION SUBCUTANEOUS at 20:28

## 2018-03-31 NOTE — ROUTINE PROCESS
TRANSFER - OUT REPORT:    Verbal report given to Jennifer HOSKINS(name) on [facility] He  being transferred to (unit) for routine progression of care       Report consisted of patients Situation, Background, Assessment and   Recommendations(SBAR). Information from the following report(s) SBAR, Kardex, ED Summary and MAR was reviewed with the receiving nurse. Lines:   Peripheral IV 03/31/18 Left Forearm (Active)   Site Assessment Clean, dry, & intact 3/31/2018  1:39 PM   Phlebitis Assessment 0 3/31/2018  1:39 PM   Infiltration Assessment 0 3/31/2018  1:39 PM   Dressing Status Clean, dry, & intact 3/31/2018  1:39 PM   Dressing Type Transparent 3/31/2018  1:39 PM   Hub Color/Line Status Pink;Flushed;Patent 3/31/2018  1:39 PM   Action Taken Blood drawn 3/31/2018  1:39 PM        Opportunity for questions and clarification was provided.       Patient transported with:   V I O

## 2018-03-31 NOTE — IP AVS SNAPSHOT
[address] 
[phone] Patient: Yari Hopper MRN: [id number] TIR:[birth year] A check emilee indicates which time of day the medication should be taken. My Medications START taking these medications Instructions Each Dose to Equal  
 Morning Noon Evening Bedtime  
 carbidopa-levodopa  mg rapid dissolve tablet Commonly known as:  PARCOPA Replaces:  carbidopa-levodopa  mg per tablet Your last dose was: Your next dose is: Take 1 Tab by mouth three (3) times daily. 1 Tab  
    
   
   
   
  
 donepezil 5 mg Tbdi  
Replaces:  donepezil 5 mg tablet Your last dose was: Your next dose is: Take 5 mg by mouth nightly. 5 mg LORazepam 2 mg/mL concentrated solution Commonly known as:  INTENSOL Your last dose was: Your next dose is: Take 0.5 mL by mouth every eight (8) hours as needed. Max Daily Amount: 3 mg. For anxiety, agitation 1 mg  
    
   
   
   
  
 traZODone 10 mg/mL Susp 10 mg/mL oral suspension (compounded) Commonly known as:  Yogesh Hightower Your last dose was: Your next dose is: Take 5 mL by mouth nightly. 50 mg  
    
   
   
   
  
  
STOP taking these medications   
 carbidopa-levodopa  mg per tablet Commonly known as:  SINEMET Replaced by:  carbidopa-levodopa  mg rapid dissolve tablet  
   
  
 donepezil 5 mg tablet Commonly known as:  ARICEPT Replaced by:  donepezil 5 mg Tbdi  
   
  
 fish oil-omega-3 fatty acids 340-1,000 mg capsule GLUCOSAMINE HCL PO  
   
  
 traZODone 50 mg tablet Commonly known as:  Yogesh Hightower Where to Get Your Medications Information on where to get these meds will be given to you by the nurse or doctor. ! Ask your nurse or doctor about these medications carbidopa-levodopa  mg rapid dissolve tablet  
 donepezil 5 mg Tbdi LORazepam 2 mg/mL concentrated solution  
 traZODone 10 mg/mL Susp 10 mg/mL oral suspension (compounded)
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
## 2018-03-31 NOTE — PROGRESS NOTES
Primary Nurse Vicki Kang RN and Bernadine Shane RN performed a dual skin assessment on this patient No impairment noted  Jean score is 18

## 2018-03-31 NOTE — ED TRIAGE NOTES
Productive cough with fever onset 5 days ago. Since that time, increased confusion.  Daughter reports \"she's refusing to eat and is spitting everywhere\"

## 2018-03-31 NOTE — IP AVS SNAPSHOT
[address] 
[phone] Patient: Iram Hopper MRN: [id number] NAC:8/99/[id number] About your hospitalization You were admitted on:  March 31, 2018 You last received care in the:  [facility] [place] 3N TELEMETRY You were discharged on:  April 5, 2018 Why you were hospitalized Your primary diagnosis was:  Uti (Urinary Tract Infection) Follow-up Information Follow up With Details Comments Contact Info Jt Figueroa MD Go on 4/9/2018 Hospital follow up PCP appointment on Monday, 4/9/18 @ 9:00 a.m.  [address] [facility] [address] [place] [address] 
[phone] Melanie De Luna MD Schedule an appointment as soon as possible for a visit in 2 weeks For follow up of urinary retention [address] [place] [address] [facility] 
[phone] Your Scheduled Appointments Monday April 09, 2018  9:00 AM EDT TRANSITIONAL CARE MANAGEMENT with Jt Figueroa MD  
[facility] Pediatrics and Internal Medicine [facility] [address] [place] [address]  
[phone] Discharge Orders None A check emilee indicates which time of day the medication should be taken. My Medications START taking these medications Instructions Each Dose to Equal  
 Morning Noon Evening Bedtime  
 carbidopa-levodopa  mg rapid dissolve tablet Commonly known as:  PARCOPA Replaces:  carbidopa-levodopa  mg per tablet Your last dose was: Your next dose is: Take 1 Tab by mouth three (3) times daily. 1 Tab  
    
   
   
   
  
 donepezil 5 mg Tbdi  
Replaces:  donepezil 5 mg tablet Your last dose was: Your next dose is: Take 5 mg by mouth nightly. 5 mg LORazepam 2 mg/mL concentrated solution Commonly known as:  INTENSOL Your last dose was: Your next dose is: Take 0.5 mL by mouth every eight (8) hours as needed. Max Daily Amount: 3 mg. For anxiety, agitation 1 mg  
    
   
   
   
  
 traZODone 10 mg/mL Susp 10 mg/mL oral suspension (compounded) Commonly known as:  Darreld Olmito and Olmito Your last dose was: Your next dose is: Take 5 mL by mouth nightly. 50 mg  
    
   
   
   
  
  
STOP taking these medications   
 carbidopa-levodopa  mg per tablet Commonly known as:  SINEMET Replaced by:  carbidopa-levodopa  mg rapid dissolve tablet  
   
  
 donepezil 5 mg tablet Commonly known as:  ARICEPT Replaced by:  donepezil 5 mg Tbdi  
   
  
 fish oil-omega-3 fatty acids 340-1,000 mg capsule GLUCOSAMINE HCL PO  
   
  
 traZODone 50 mg tablet Commonly known as:  Darreld Olmito and Olmito Where to Get Your Medications Information on where to get these meds will be given to you by the nurse or doctor. ! Ask your nurse or doctor about these medications  
  carbidopa-levodopa  mg rapid dissolve tablet  
 donepezil 5 mg Tbdi LORazepam 2 mg/mL concentrated solution  
 traZODone 10 mg/mL Susp 10 mg/mL oral suspension (compounded) Discharge Instructions Please bring this form with you to show your primary care provider at your follow-up appointment. Primary care provider:  Dr. Selvin Donovan MD 
 
Discharging provider:  Kirsten Spangler MD 
 
You have been admitted to the hospital with the following diagnoses: 
UTI (urinary tract infection) FOLLOW-UP CARE RECOMMENDATIONS: 
 
APPOINTMENTS: 
Follow-up Information Follow up With Details Comments Contact Info Selvin Donovan MD Go on 4/9/2018 Hospital follow up PCP appointment on Monday, 4/9/18 @ 9:00 a.m.  [address] [place] [address] [place] [address] 
[phone]  Raisa Beauchamp MD Schedule an appointment as soon as possible for a visit in 2 weeks For follow up of urinary retention [address] [facility] [address] 
[phone] Future Appointments Date Time Provider Kyler Dsouza 4/9/2018 9:00 AM Wero Floyd MD [address] [facility] FOLLOW UP TESTS recommended:  Kidney and bladder ultrasound in 2 weeks with Dr. [place] to watch for: shortness of breath, fever, chills, nausea, vomiting, diarrhea, pain, confusion. DIET/what to eat:  Regular Diet ACTIVITY:  Activity as tolerated What to do if new or unexpected symptoms occur? If you experience any of the above symptoms (or should other concerns or questions arise after discharge) please call your primary care physician. Return to the emergency room if you cannot get hold of your doctor. · It is very important that you keep your follow-up appointment(s). · Please bring discharge papers, medication list (and/or medication bottles) to your follow-up appointments for review by your outpatient provider(s). · Please check the list of medications and be sure it includes every medication (even non-prescription medications) that your provider wants you to take. · It is important that you take the medication exactly as they are prescribed. · Keep your medication in the bottles provided by the pharmacist and keep a list of the medication names, dosages, and times to be taken in your wallet. · Do not take other medications without consulting your doctor. · If you have any questions about your medications or other instructions, please talk to your nurse or care provider before you leave the hospital. 
 
I understand that if any problems occur once I am at home I am to contact my physician. These instructions were explained to me and I had the opportunity to ask questions. Rafi Announcement  We are excited to announce that we are making your provider's discharge notes available to you in [employer]. You will see these notes when they are completed and signed by the physician that discharged you from your recent hospital stay. If you have any questions or concerns about any information you see in Insta[employer], please call the Health Information Department where you were seen or reach out to your Primary Care Provider for more information about your plan of care. Introducing [facility] & HEALTH SERVICES! Mary Varma introduces Instahealth patient portal. Now you can access parts of your medical record, email your doctor's office, and request medication refills online. 1. In your internet browser, go to [email or web address] 2. Click on the First Time User? Click Here link in the Sign In box. You will see the New Member Sign Up page. 3. Enter your Instahealth Access Code exactly as it appears below. You will not need to use this code after youve completed the sign-up process. If you do not sign up before the expiration date, you must request a new code. · Instahealth Access Code: [facility] Expires: 5/26/2018  1:26 PM 
 
4. Enter the last four digits of your Social Security Number (xxxx) and Date of Birth (mm/dd/yyyy) as indicated and click Submit. You will be taken to the next sign-up page. 5. Create a Instahealth ID. This will be your Instahealth login ID and cannot be changed, so think of one that is secure and easy to remember. 6. Create a Insta[employer] password. You can change your password at any time. 7. Enter your Password Reset Question and Answer. This can be used at a later time if you forget your password. 8. Enter your e-mail address. You will receive e-mail notification when new information is available in [address] 9. Click Sign Up. You can now view and download portions of your medical record. 10. Click the Download Summary menu link to download a portable copy of your medical information. If you have questions, please visit the Frequently Asked Questions section of the MyChart website. Remember, [employer] is NOT to be used for urgent needs. For medical emergencies, dial 911. Now available from your iPhone and Android! Introducing Brandon Rebolledo As a [facility] patient, I wanted to make you aware of our electronic visit tool called Brandon Rebolledo. Startups allows you to connect within minutes with a medical provider 24 hours a day, seven days a week via a mobile device or tablet or logging into a secure website from your computer. You can access Brandon Rebolledo from anywhere in the [place]. A virtual visit might be right for you when you have a simple condition and feel like you just dont want to get out of bed, or cant get away from work for an appointment, when your regular [facility] provider is not available (evenings, weekends or holidays), or when youre out of town and need minor care. Electronic visits cost only $49 and if the [employer] provider determines a prescription is needed to treat your condition, one can be electronically transmitted to a nearby pharmacy*. Please take a moment to enroll today if you have not already done so. The enrollment process is free and takes just a few minutes. To enroll, please download the Startups jennifer to your tablet or phone, or visit [email or web address] to enroll on your computer. And, as an [address] [place] [address] patient with a [employer] account, the results of your visits will be scanned into your electronic medical record and your primary care provider will be able to view the scanned results. We urge you to continue to see your regular [facility] provider for your ongoing medical care.   And while your primary care provider may not be the one available when you seek a Brandon Rebolledo virtual visit, the peace of mind you get from getting a real diagnosis real time can be priceless. For more information on Brandon Hornalyxfin, view our Frequently Asked Questions (FAQs) at [email or web address]. Sincerely, 
 
Janie Joseph MD 
Chief Medical Officer [employer] *:  certain medications cannot be prescribed via Brandon Korydavey Unresulted Labs-Please follow up with your PCP about these lab tests Order Current Status CULTURE, BLOOD Preliminary result Last Palliative Care Discharge Note 04/04/18 2116  Version 1 of 1 Goals of Care/Treatment Preferences The Palliative Medicine team was consulted as part of your/your loved one's care in the hospital. Our team is a supportive service; we strive to relieve suffering and improve quality of life. We reviewed advance care planning information, which includes the following: 
Patient's [address] is[de-identified] Legal Next of Kin Primary Decision Maker Name: Kelly Barksdale Primary Decision Maker Phone Number: [phone] Primary Decision Maker Relationship to Patient: Spouse Confirm Advance Directive: None (family unsure) Patient/Health Care Proxy Stated Goals: Other (comment) (full restorative measures for now) We reviewed / discussed your code status as: DNR 
   Full Code means perform CPR in the event of cardiac arrest. 
    DNR means do NOT perform CPR in the event of cardiac arrest. 
    Partial Code means you have specific preferences, please discuss with your healthcare team. 
    Chapo Williamson means this issue was not addressed / resolved during your stay Medical Interventions: Limited additional interventions Other Instructions: We discussed the use of Home Based primary care and we will have the team contact patient's daughter. We also discussed options in the future to include Home Health versus Hospice care.   
  
 
Because of the importance of this information, we are providing you with a printed copy to share with other healthcare providers after this hospitalization is complete. Providers Seen During Your Hospitalization Provider Specialty Primary office phone Elida Gaspar MD Emergency Medicine [phone] Sherlyn Case MD Internal Medicine [phone] [address] MD Gris Internal Medicine [phone] Pradip Rankin MD Internal Medicine [phone] Your Primary Care Physician (PCP) Primary Care Physician Office Phone Office Fax Carlyon Duane [phone] You are allergic to the following No active allergies Recent Documentation Height Weight BMI OB Status Smoking Status 1.524 m 50.8 kg 21.87 kg/m2 Postmenopausal Never Smoker Emergency Contacts Name Discharge Info Relation Home Work Mobile Mike Jaquez DISCHARGE CAREGIVER [3] Daughter [21] [phone] Patient Belongings The following personal items are in your possession at time of discharge: 
  Dental Appliances: With patient, Uppers, Lowers         Home Medications: None   Jewelry: None  Clothing: None    Other Valuables: None Please provide this summary of care documentation to your next provider. Signatures-by signing, you are acknowledging that this After Visit Summary has been reviewed with you and you have received a copy. Patient Signature:  ____________________________________________________________ Date:  ____________________________________________________________  
  
Arna [place] Provider Signature:  ____________________________________________________________ Date:  ____________________________________________________________

## 2018-03-31 NOTE — PROGRESS NOTES
Care Management- Consult received to meet with patient in the ED to discuss resources. Patient's last admission was 2-22-18 to 2-25-18 under observation status for acute metabolic encephalopathy. Patient was [facility] with [facility]. Per chart review patient also has used [facility]. Reason for Admission:      Patient to ED for increased confusion, refusing to eat and spitting. RRAT Score:   12     Plan for utilizing home health:       Currently open to [facility] [address] for PT, OT, speech  Likelihood of Readmission:    Low    Met with patient's  and her daughter in the patient's ED room. Patient was in the room, but was asleep. Per MD, patient does not speak [place]. Confined address and emergency contact information on chart. Patient lives with her , her daughter and granddaughter. Patient's other daughter also comes over to help. They assist patient with her bathing and toileting. Patient has a walker, but usually uses handhold assist of 2 people to walk in the home. Patient normally needs assist with bathing. She also needs assist to walk to and from bathroom. The last few days, it takes 2 people to help her walk. She has still fallen 2 times since she has been so wobbly. She is also spitting every time someone tries to help her. Patient has not wanted to take her medications the last 2 nights. Daughter is concerned about patient having some type of infection, maybe a UTI. This has made her more confused in the past.    Currently the family does all of her care. Discussed Medicaid Personal Care for home. They are interested in this. She said someone from the [facility] came out 6 months ago and they told them to apply for Medicaid for patient and they did. She does not know if a UAI has been completed. Encouraged to call  to request a UAI be completed if patient is discharged from the ED. If patient is admitted, CM can do UAI. Patient's nurse came in.  Patient has a hospitalist consult for admission. At the time of this writing, patient has a inpatient order for admission for a UTI. Family mentioned respite care at assisted living paid for by Medicaid. Discussed in personal care as an option. Patient has [facility]. The family said that the only [place] MD the patient uses is the psychiatrist and he comes to see her at home once a month. Care Management Interventions  PCP Verified by CM:  Yes (Dr. Hilary Mauricio)  Last Visit to PCP: 03/01/18  Mode of Transport at Discharge:  (family if patient is cooperative)  Transition of Care Consult (CM Consult):  (Open to [facility] [address] for PT, OT , and speech)  Discharge Durable Medical Equipment:  (Has a walker at home.)  Current Support Network: Lives with Spouse (Patient lives with her  and her daughter. )  Confirm Follow Up Transport: Family  Plan discussed with Pt/Family/Caregiver: Yes  Discharge Location  Discharge Placement: Home with home health     MAKENNA Mendoza

## 2018-03-31 NOTE — H&P
[address]  ACUTE CARE HISTORY AND PHYSICAL    Name:SANTIAGO Strange  MR#: [id number]  : 1945  ACCOUNT #: [de-identified]   DATE OF SERVICE: 2018    PRIMARY CARE PROVIDER:  Jt Figueroa MD      SOURCE OF INFORMATION:  Daughter at the bedside and from her medical record and ER staff. CHIEF COMPLAINT:  Altered mental status, 3 days' duration. HISTORY OF PRESENT ILLNESS:  This is a 76-year-old [facility] female with past medical history significant for Parkinson disease, dementia, recurrent urinary tract infection and osteopenia, who is brought to [facility] Emergency Department for altered mental status, 3 days' duration. The patient is a poor historian and information is obtained from her daughter at the bedside and reviewing her medical record. The patient had a fever last week and her daughter gave her Advil and her temperature improved. Three to five days ago she became agitated, restless, which progressively worsened and decided to bring her to [facility] Emergency Department. She had a fall yesterday and today x2. She has also lung congestion, cough, and poor appetite. No nausea, vomiting, left-sided chest pain, abdominal pain or abnormal bowel movement. But she has not been eating or drinking that much for the last couple of days. In the ER when she arrived, her vital signs, blood pressure 138/80, pulse 86, temperature 98.2, saturation of oxygen 97% on room air. CT of the head without contrast was done and no acute intracranial process. Chest x-ray was done and no acute cardiopulmonary disease. The patient received IV ceftriaxone and referred to hospitalist service for further evaluation and admission. REVIEW OF SYSTEMS:  Unable to obtain from the patient. The patient is nonverbal, but follows commmand. PAST MEDICAL HISTORY:  1. Recurrent urinary tract infection. 2.  Osteopenia. 3.  Parkinson disease. 4.  Dementia.     MEDICATIONS PRIOR TO ADMISSION:  Include:    1. Trazodone 50 mg p.o. at bedtime. 2.  Carbidopa-levodopa 25/100 mg 1.5 tablets 3 times daily. 3.  Fish oil 1 capsule p.o. daily. ALLERGIES:  NO KNOWN DRUG ALLERGIES. SOCIAL HISTORY:  The patient lives with her daughter. She ambulates, but she uses a walker most of the time. No tobacco or alcohol abuse. CODE STATUS:  FULL CODE. FAMILY HISTORY:  Her father was  during war. Her mother  from old age. ADVANCED CARE PLANNING:  ADVANCED CARE PLANNING DISCUSSED WITH HER DAUGHTER AT THE BEDSIDE. SHE STATED SHE DOES NOT HAVE ADVANCED DIRECTIVE, BUT SHE WANTS HER TO BE A FULL CODE and she said she is the one who is medical decision maker in case of emergency. Her daughter's name is Lisa. PHYSICAL EXAMINATION:  VITAL SIGNS:  Blood pressure 123/60, pulse 84, temperature 98.1, respiratory rate 18, saturation of oxygen 98%. BMI 21.85 kg/m2. GENERAL APPEARANCE:  The patient is alert, cooperative, not in cardiorespiratory distress. HEENT:  Pink conjunctivae, anicteric sclerae. Dry tongue and buccal mucosa. LUNGS:  Clear to auscultation bilaterally. CHEST WALL:  No tenderness or deformity. CARDIOVASCULAR:  Regular rate and rhythm. S1 and S2 normal.  No murmur, rub or gallop. ABDOMEN:  Soft, nontender. Bowel sounds normal.  No mass or organomegaly. EXTREMITIES:  No cyanosis or edema. SKIN:  No rash or lesion. CENTRAL NERVOUS SYSTEM:  The patient is conscious and alert but nonverbal, follows command and moves extremities. LABORATORY DATA:  White blood cell count 7.4, hemoglobin 12.7, hematocrit 38.7, MCV 94.9, platelet count 339. Urinalysis, white blood cell count more than 100, leukocyte esterase large, nitrite positive, bacteria 2+, RBCs 0-5.   Chemistry:  Sodium 136, potassium 4.8, chloride 104, CO2 of 26, anion gap 6, glucose 102, BUN 17, creatinine 0.95, BUN and creatinine ratio 18, calcium 8.9, total bilirubin 0.89, total protein 7.6, albumin 3.2, globulin 4.4, ALT 25, AST 34, alkaline phosphatase 57. Troponin less than 0.04. ProBNP 207. CT SCAN OF THE HEAD:  Impression:  1. No evidence of acute infarct or intracranial hemorrhage. 2.  Mild periventricular white matter disease is likely secondary to chronic small vessel ischemic changes. CHEST X-RAY:  No acute cardiopulmonary process. EKG:  Normal sinus rhythm, ventricular rate 82 beats per minute, nonspecific ST waves. No significant EKG change comparing to EKG on 04/11/2017. ASSESSMENT:  1. Recurrent urinary tract infection. 2.  Metabolic encephalopathy. 3.  History of Parkinson disease. 4.  Dementia. 5.  Recurrent Fall    PLAN:  1. Recurrent urinary tract infection, present on admission. Admit patient to medical floor. Continue ceftriaxone 1 g IV q.24 hours, IV fluid at 100 mL per hour. Follow up blood and urine culture. 2.  Metabolic encephalopathy due to urinary tract infection. CT scan of the head, no acute intracranial process. Continue neuro check, fall precautions, p.r.n. Ativan for agitation or restlessness. Check bedside swallowing and resume regular diet. 3.  Parkinson disease. Continue home medication of Sinemet, supportive care. 4.  Dementia with abnormal behavior. CT scan, no acute finding. Continue supportive care, p.r.n. Ativan for agitation. 5,  Recurrent Fall, fall precaution, multifactorial, consult PT/OT. DVT prophylaxis. Lovenox.       MD FREDERICK Hernandez / Viviana Tao  D: 03/31/2018 18:05     T: 03/31/2018 19:11  JOB #: [id number]

## 2018-04-01 ENCOUNTER — APPOINTMENT (OUTPATIENT)
Dept: ULTRASOUND IMAGING | Age: 73
DRG: 689 | End: 2018-04-01
Attending: HOSPITALIST
Payer: MEDICARE

## 2018-04-01 LAB
ALBUMIN SERPL-MCNC: 2.9 G/DL (ref 3.5–5)
ALBUMIN/GLOB SERPL: 0.7 {RATIO} (ref 1.1–2.2)
ALP SERPL-CCNC: 56 U/L (ref 45–117)
ALT SERPL-CCNC: 10 U/L (ref 12–78)
ANION GAP SERPL CALC-SCNC: 7 MMOL/L (ref 5–15)
AST SERPL-CCNC: 25 U/L (ref 15–37)
BILIRUB SERPL-MCNC: 0.8 MG/DL (ref 0.2–1)
BUN SERPL-MCNC: 11 MG/DL (ref 6–20)
BUN/CREAT SERPL: 14 (ref 12–20)
CALCIUM SERPL-MCNC: 8.7 MG/DL (ref 8.5–10.1)
CHLORIDE SERPL-SCNC: 108 MMOL/L (ref 97–108)
CO2 SERPL-SCNC: 27 MMOL/L (ref 21–32)
CREAT SERPL-MCNC: 0.76 MG/DL (ref 0.55–1.02)
ERYTHROCYTE [DISTWIDTH] IN BLOOD BY AUTOMATED COUNT: 11.9 % (ref 11.5–14.5)
GLOBULIN SER CALC-MCNC: 4.1 G/DL (ref 2–4)
GLUCOSE SERPL-MCNC: 96 MG/DL (ref 65–100)
HCT VFR BLD AUTO: 37.7 % (ref 35–47)
HGB BLD-MCNC: 12.3 G/DL (ref 11.5–16)
MCH RBC QN AUTO: 30.8 PG (ref 26–34)
MCHC RBC AUTO-ENTMCNC: 32.6 G/DL (ref 30–36.5)
MCV RBC AUTO: 94.3 FL (ref 80–99)
NRBC # BLD: 0 K/UL (ref 0–0.01)
NRBC BLD-RTO: 0 PER 100 WBC
PLATELET # BLD AUTO: 231 K/UL (ref 150–400)
PMV BLD AUTO: 9 FL (ref 8.9–12.9)
POTASSIUM SERPL-SCNC: 3.6 MMOL/L (ref 3.5–5.1)
PROT SERPL-MCNC: 7 G/DL (ref 6.4–8.2)
RBC # BLD AUTO: 4 M/UL (ref 3.8–5.2)
SODIUM SERPL-SCNC: 142 MMOL/L (ref 136–145)
WBC # BLD AUTO: 6.3 K/UL (ref 3.6–11)

## 2018-04-01 PROCEDURE — 74011000258 HC RX REV CODE- 258: Performed by: HOSPITALIST

## 2018-04-01 PROCEDURE — 80053 COMPREHEN METABOLIC PANEL: CPT | Performed by: HOSPITALIST

## 2018-04-01 PROCEDURE — G8978 MOBILITY CURRENT STATUS: HCPCS

## 2018-04-01 PROCEDURE — 97116 GAIT TRAINING THERAPY: CPT

## 2018-04-01 PROCEDURE — 65210000002 HC RM PRIVATE GYN

## 2018-04-01 PROCEDURE — G8979 MOBILITY GOAL STATUS: HCPCS

## 2018-04-01 PROCEDURE — 74011250637 HC RX REV CODE- 250/637: Performed by: HOSPITALIST

## 2018-04-01 PROCEDURE — 85027 COMPLETE CBC AUTOMATED: CPT | Performed by: HOSPITALIST

## 2018-04-01 PROCEDURE — 76770 US EXAM ABDO BACK WALL COMP: CPT

## 2018-04-01 PROCEDURE — 74011250636 HC RX REV CODE- 250/636: Performed by: HOSPITALIST

## 2018-04-01 PROCEDURE — 97161 PT EVAL LOW COMPLEX 20 MIN: CPT

## 2018-04-01 PROCEDURE — 36415 COLL VENOUS BLD VENIPUNCTURE: CPT | Performed by: HOSPITALIST

## 2018-04-01 RX ORDER — HYDRALAZINE HYDROCHLORIDE 20 MG/ML
10 INJECTION INTRAMUSCULAR; INTRAVENOUS ONCE
Status: COMPLETED | OUTPATIENT
Start: 2018-04-02 | End: 2018-04-02

## 2018-04-01 RX ORDER — DONEPEZIL HYDROCHLORIDE 5 MG/1
5 TABLET, FILM COATED ORAL
COMMUNITY
Start: 2018-03-15 | End: 2018-04-05

## 2018-04-01 RX ADMIN — ENOXAPARIN SODIUM 40 MG: 40 INJECTION SUBCUTANEOUS at 18:31

## 2018-04-01 RX ADMIN — CEFTRIAXONE 1 G: 1 INJECTION, POWDER, FOR SOLUTION INTRAMUSCULAR; INTRAVENOUS at 16:28

## 2018-04-01 RX ADMIN — CARBIDOPA AND LEVODOPA 1.5 TABLET: 25; 100 TABLET ORAL at 21:51

## 2018-04-01 NOTE — PROGRESS NOTES
Bedside and Verbal shift change report given to Anibal Bhatt (oncoming nurse) by Zainab Deng (offgoing nurse). Report included the following information SBAR, Kardex, Procedure Summary, Intake/Output, MAR and Recent Results.

## 2018-04-01 NOTE — PROGRESS NOTES
Hospitalist Progress Note                               Raoul Adrian MD                                     Answering service: [phone]                               OR [phone] from in house phone                               Cell: [phone]              Date of Service:  2018  NAME:  Tresa Hopper  :  1945  MRN:  [id number]      Admission Summary: This is a 77-year-old [facility] female with past medical history significant for Parkinson disease, dementia, recurrent urinary tract infection and osteopenia, who is brought to [facility] Emergency Department for altered mental status, 3 days' duration. The patient is a poor historian and information is obtained from her daughter at the bedside and reviewing her medical record. The patient had a fever last week and her daughter gave her Advil and her temperature improved. Three to five days ago she became agitated, restless, which progressively worsened and decided to bring her to [facility] Emergency Department. She had a fall yesterday and today x2. She has also lung congestion, cough, and poor appetite. No nausea, vomiting, left-sided chest pain, abdominal pain or abnormal bowel movement. But she has not been eating or drinking that much for the last couple of days. In the ER when she arrived, her vital signs, blood pressure 138/80, pulse 86, temperature 98.2, saturation of oxygen 97% on room air. CT of the head without contrast was done and no acute intracranial process. Chest x-ray was done and no acute cardiopulmonary disease. The patient received IV ceftriaxone and referred to hospitalist service for further evaluation and admission. Interval history / Subjective:    Patient is Chinese speaker,and she has dementia and was admitted for altered mental status. Called daughter on the inhouse phone. Patient talked her daughter some,she was not talking yesterday per daughter. Assessment & Plan:   Recurrent urinary tract infection, present on admission  -Urine growing GNR. Contineu ceftriaxone. Blood cx no growth thus far. -Renal US as she has recurrent UTI. May need out patient urology evaluation     Acute metabolic encephalopathy due to urinary tract infection in the setting of advanced dementia  - CT scan of the head. Mental status improving per family. Parkinson disease. Continue home medication of Sinemet, supportive care. Dementia with abnormal behavior. CT scan, no acute finding. Continue supportive care, p.r.n. Ativan for agitation. Recurrent Fall, fall precaution, multifactorial, consult PT/OT. Diet:regular  Code status: full  DVT prophylaxis: lovenox  Care Plan discussed with: talked with daughter on the phone     Discharge planning/disposition:home when medically stable. Hospital Problems  Date Reviewed: 4/1/2018          Codes Class Noted POA    * (Principal)UTI (urinary tract infection) ICD-10-CM: N39.0  ICD-9-CM: 599.0  3/31/2018 Unknown                Review of Systems:   Review of systems not obtained due to patient factors. Physical Examination:      Last 24hrs VS reviewed since prior progress note. Most recent are:  Visit Vitals    /76 (BP 1 Location: Left arm, BP Patient Position: At rest)    Pulse 84    Temp 98.4 °F (36.9 °C)    Resp 16    Ht 5' (1.524 m)    Wt 50.8 kg (112 lb)    SpO2 99%    BMI 21.87 kg/m2           Constitutional:  No acute distress. HEENT: Head is a traumatic,  Un icteric sclera. Pink conjunctiva,no erythema or discharge. Oral mucous moist, oropharynx benign. Neck supple,    Resp:  CTA bilaterally. No wheezing/rhonchi/rales. No accessory muscle use   CV:  Regular rhythm, normal rate, no murmurs, gallops, rubs    GI:  Soft, non distended, non tender.  normoactive bowel sounds, no hepatosplenomegaly    :  No CVA or suprapubic tenderness   Skin  :  No erythema,rash,bullae,dipigmentation     Musculoskeletal:  No edema, warm, 2+ pulses throughout    Neurologic: Awake. Demented. Moves all extremities. Intake/Output Summary (Last 24 hours) at 04/01/18 0715  Last data filed at 04/01/18 0417   Gross per 24 hour   Intake           733.33 ml   Output              200 ml   Net           533.33 ml          Data Review:    Review and/or order of clinical lab test  Review and/or order of tests in the radiology section of [facility]  Review and/or order of tests in the medicine section of [facility]      Labs:     Recent Labs      04/01/18 0238 03/31/18   1337   WBC  6.3  7.4   HGB  12.3  12.7   HCT  37.7  38.7   PLT  231  245     Recent Labs      04/01/18 0238 03/31/18   1337   NA  142  136   K  3.6  4.8   CL  108  104   CO2  27  26   BUN  11  17   CREA  0.76  0.95   GLU  96  102*   CA  8.7  8.9     Recent Labs      04/01/18 0238 03/31/18   1337   SGOT  25  34   ALT  10*  25   AP  56  57   TBILI  0.8  0.9   TP  7.0  7.6   ALB  2.9*  3.2*   GLOB  4.1*  4.4*     No results for input(s): INR, PTP, APTT in the last 72 hours. No lab exists for component: INREXT   No results for input(s): FE, TIBC, PSAT, FERR in the last 72 hours. No results found for: FOL, RBCF   No results for input(s): PH, PCO2, PO2 in the last 72 hours.   Recent Labs      03/31/18   1337   TROIQ  <0.04     Lab Results   Component Value Date/Time    Cholesterol, total 137 02/22/2018 10:22 PM    HDL Cholesterol 64 02/22/2018 10:22 PM    LDL, calculated 59.4 02/22/2018 10:22 PM    Triglyceride 68 02/22/2018 10:22 PM    CHOL/HDL Ratio 2.1 02/22/2018 10:22 PM     Lab Results   Component Value Date/Time    Glucose (POC) 79 02/22/2018 06:27 PM     Lab Results   Component Value Date/Time    Color YELLOW/STRAW 03/31/2018 03:32 PM    Appearance CLOUDY (A) 03/31/2018 03:32 PM    Specific gravity 1.015 03/31/2018 03:32 PM    pH (UA) 7.0 03/31/2018 03:32 PM    Protein NEGATIVE  03/31/2018 03:32 PM    Glucose NEGATIVE  03/31/2018 03:32 PM    Ketone NEGATIVE  03/31/2018 03:32 PM    Bilirubin NEGATIVE  03/31/2018 03:32 PM    Urobilinogen 0.2 03/31/2018 03:32 PM    Nitrites POSITIVE (A) 03/31/2018 03:32 PM    Leukocyte Esterase LARGE (A) 03/31/2018 03:32 PM    Epithelial cells FEW 03/31/2018 03:32 PM    Bacteria 2+ (A) 03/31/2018 03:32 PM    WBC >100 (H) 03/31/2018 03:32 PM    RBC 0-5 03/31/2018 03:32 PM         Medications Reviewed:     Current Facility-Administered Medications   Medication Dose Route Frequency    carbidopa-levodopa (SINEMET)  mg per tablet 1.5 Tab  1.5 Tab Oral TID    fish oil-omega-3 fatty acids 340-1,000 mg capsule 1 Cap  1 Cap Oral DAILY    sodium chloride (NS) flush 5-10 mL  5-10 mL IntraVENous Q8H    sodium chloride (NS) flush 5-10 mL  5-10 mL IntraVENous PRN    cefTRIAXone (ROCEPHIN) 1 g in 0.9% sodium chloride (MBP/ADV) 50 mL  1 g IntraVENous Q24H    0.9% sodium chloride infusion  100 mL/hr IntraVENous CONTINUOUS    acetaminophen (TYLENOL) tablet 650 mg  650 mg Oral Q4H PRN    LORazepam (ATIVAN) injection 1 mg  1 mg IntraVENous Q8H PRN    enoxaparin (LOVENOX) injection 40 mg  40 mg SubCUTAneous Q24H     ______________________________________________________________________  EXPECTED LENGTH OF STAY: - - -  ACTUAL LENGTH OF STAY:          1                 Kristi Cooney MD

## 2018-04-01 NOTE — PROGRESS NOTES
Bedside shift change report given to Jennifer (oncoming nurse) by Sarah Ordonez (offgoing nurse). Report included the following information SBAR.

## 2018-04-02 LAB
ATRIAL RATE: 82 BPM
BACTERIA SPEC CULT: ABNORMAL
CALCULATED P AXIS, ECG09: 66 DEGREES
CALCULATED R AXIS, ECG10: 63 DEGREES
CALCULATED T AXIS, ECG11: 66 DEGREES
CC UR VC: ABNORMAL
DIAGNOSIS, 93000: NORMAL
P-R INTERVAL, ECG05: 200 MS
Q-T INTERVAL, ECG07: 382 MS
QRS DURATION, ECG06: 74 MS
QTC CALCULATION (BEZET), ECG08: 446 MS
SERVICE CMNT-IMP: ABNORMAL
VENTRICULAR RATE, ECG03: 82 BPM

## 2018-04-02 PROCEDURE — 74011000258 HC RX REV CODE- 258: Performed by: HOSPITALIST

## 2018-04-02 PROCEDURE — 74011250637 HC RX REV CODE- 250/637: Performed by: HOSPITALIST

## 2018-04-02 PROCEDURE — 97116 GAIT TRAINING THERAPY: CPT

## 2018-04-02 PROCEDURE — 65210000002 HC RM PRIVATE GYN

## 2018-04-02 PROCEDURE — 74011250636 HC RX REV CODE- 250/636: Performed by: INTERNAL MEDICINE

## 2018-04-02 PROCEDURE — 97530 THERAPEUTIC ACTIVITIES: CPT

## 2018-04-02 PROCEDURE — 74011250636 HC RX REV CODE- 250/636: Performed by: HOSPITALIST

## 2018-04-02 RX ORDER — TRAZODONE HYDROCHLORIDE 50 MG/1
50 TABLET ORAL
Status: DISCONTINUED | OUTPATIENT
Start: 2018-04-02 | End: 2018-04-06 | Stop reason: HOSPADM

## 2018-04-02 RX ORDER — DONEPEZIL HYDROCHLORIDE 5 MG/1
5 TABLET, FILM COATED ORAL
Status: DISCONTINUED | OUTPATIENT
Start: 2018-04-02 | End: 2018-04-06 | Stop reason: HOSPADM

## 2018-04-02 RX ADMIN — LORAZEPAM 1 MG: 2 INJECTION, SOLUTION INTRAMUSCULAR; INTRAVENOUS at 19:03

## 2018-04-02 RX ADMIN — HYDRALAZINE HYDROCHLORIDE 10 MG: 20 INJECTION INTRAMUSCULAR; INTRAVENOUS at 00:16

## 2018-04-02 RX ADMIN — ENOXAPARIN SODIUM 40 MG: 40 INJECTION SUBCUTANEOUS at 19:03

## 2018-04-02 RX ADMIN — CEFTRIAXONE 1 G: 1 INJECTION, POWDER, FOR SOLUTION INTRAMUSCULAR; INTRAVENOUS at 18:43

## 2018-04-02 RX ADMIN — LORAZEPAM 1 MG: 2 INJECTION, SOLUTION INTRAMUSCULAR; INTRAVENOUS at 00:16

## 2018-04-02 RX ADMIN — Medication 1 CAPSULE: at 09:08

## 2018-04-02 NOTE — PROGRESS NOTES
Care Management: referral placed to resume PT/OT/Speach with [facility] when medically stable and they have accepted patient to resume therapies. To follow transition of care.     Parker Palma RN BSN CM

## 2018-04-02 NOTE — PROGRESS NOTES
Hospitalist Progress Note                               Derotha Severe, MD                                     Answering service: [phone]                               OR [phone] from in house phone                               Cell: [phone]              Date of Service:  2018  NAME:  Derrell Hopper  :  1945  MRN:  [id number]      Admission Summary: This is a 78-year-old [facility] female with past medical history significant for Parkinson disease, dementia, recurrent urinary tract infection and osteopenia, who is brought to [facility] Emergency Department for altered mental status, 3 days' duration. The patient is a poor historian and information is obtained from her daughter at the bedside and reviewing her medical record. The patient had a fever last week and her daughter gave her Advil and her temperature improved. Three to five days ago she became agitated, restless, which progressively worsened and decided to bring her to [facility] Emergency Department. She had a fall yesterday and today x2. She has also lung congestion, cough, and poor appetite. No nausea, vomiting, left-sided chest pain, abdominal pain or abnormal bowel movement. But she has not been eating or drinking that much for the last couple of days. In the ER when she arrived, her vital signs, blood pressure 138/80, pulse 86, temperature 98.2, saturation of oxygen 97% on room air. CT of the head without contrast was done and no acute intracranial process. Chest x-ray was done and no acute cardiopulmonary disease. The patient received IV ceftriaxone and referred to hospitalist service for further evaluation and admission. Interval history / Subjective:    Patient is [facility] speaker,her  and daughter in the room. Patient interactive. Assessment & Plan:   Recurrent urinary tract infection, present on admission  -Urine growing E coli,on ceftriaxone. Blood cx no growth thus far. -Renal US shows mild bilateral hydroureteronephrosis and distended bladder. urology consulted.  -Daughter stated patient she spits pills,she has to finish antibiotics here    Acute metabolic encephalopathy due to urinary tract infection in the setting of advanced dementia  - CT scan of the head. Mental status improving per family. Parkinson disease. Continue home medication of Sinemet, supportive care. Dementia with abnormal behavior. CT scan, no acute finding. Continue supportive care, p.r.n. Ativan for agitation. Recurrent Fall, fall precaution, multifactorial, consult PT/OT. Diet:regular  Code status: full  DVT prophylaxis: lovenox  Care Plan discussed with: talked with daughter on the phone     Discharge planning/disposition:  Home with family possible tomorrow after urology eval.She would have received 3 doses of ceftriaxone by them    Hospital Problems  Date Reviewed: 4/1/2018          Codes Class Noted POA    * (Principal)UTI (urinary tract infection) ICD-10-CM: N39.0  ICD-9-CM: 599.0  3/31/2018 Unknown                Review of Systems:   Review of systems not obtained due to patient factors. Physical Examination:      Last 24hrs VS reviewed since prior progress note. Most recent are:  Visit Vitals    /59 (BP 1 Location: Right arm, BP Patient Position: Supine)    Pulse 93    Temp 97.9 °F (36.6 °C)    Resp 16    Ht 5' (1.524 m)    Wt 50.8 kg (112 lb)    SpO2 95%    BMI 21.87 kg/m2           Constitutional:  No acute distress. HEENT: Head is a traumatic,  Un icteric sclera. Pink conjunctiva,no erythema or discharge. Oral mucous moist, oropharynx benign. Neck supple,    Resp:  CTA bilaterally. No wheezing/rhonchi/rales. No accessory muscle use   CV:  Regular rhythm, normal rate, no murmurs, gallops, rubs    GI:  Soft, non distended, non tender.  normoactive bowel sounds, no hepatosplenomegaly    :  No CVA or suprapubic tenderness   Skin  :  No erythema,rash,bullae,dipigmentation     Musculoskeletal:  No edema, warm, 2+ pulses throughout    Neurologic: Awake. Demented. Moves all extremities. Intake/Output Summary (Last 24 hours) at 04/02/18 1639  Last data filed at 04/02/18 1357   Gross per 24 hour   Intake          2548. 33 ml   Output             2750 ml   Net          -201.67 ml          Data Review:    Review and/or order of clinical lab test  Review and/or order of tests in the radiology section of [facility]  Review and/or order of tests in the medicine section of [facility]      Labs:     Recent Labs      04/01/18 0238  03/31/18   1337   WBC  6.3  7.4   HGB  12.3  12.7   HCT  37.7  38.7   PLT  231  245     Recent Labs      04/01/18 0238 03/31/18   1337   NA  142  136   K  3.6  4.8   CL  108  104   CO2  27  26   BUN  11  17   CREA  0.76  0.95   GLU  96  102*   CA  8.7  8.9     Recent Labs      04/01/18 0238 03/31/18   1337   SGOT  25  34   ALT  10*  25   AP  56  57   TBILI  0.8  0.9   TP  7.0  7.6   ALB  2.9*  3.2*   GLOB  4.1*  4.4*     No results for input(s): INR, PTP, APTT in the last 72 hours. No lab exists for component: INREXT, INREXT   No results for input(s): FE, TIBC, PSAT, FERR in the last 72 hours. No results found for: FOL, RBCF   No results for input(s): PH, PCO2, PO2 in the last 72 hours.   Recent Labs      03/31/18   1337   TROIQ  <0.04     Lab Results   Component Value Date/Time    Cholesterol, total 137 02/22/2018 10:22 PM    HDL Cholesterol 64 02/22/2018 10:22 PM    LDL, calculated 59.4 02/22/2018 10:22 PM    Triglyceride 68 02/22/2018 10:22 PM    CHOL/HDL Ratio 2.1 02/22/2018 10:22 PM     Lab Results   Component Value Date/Time    Glucose (POC) 79 02/22/2018 06:27 PM     Lab Results   Component Value Date/Time    Color YELLOW/STRAW 03/31/2018 03:32 PM    Appearance CLOUDY (A) 03/31/2018 03:32 PM    Specific gravity 1.015 03/31/2018 03:32 PM    pH (UA) 7.0 03/31/2018 03:32 PM    Protein NEGATIVE  03/31/2018 03:32 PM Glucose NEGATIVE  03/31/2018 03:32 PM    Ketone NEGATIVE  03/31/2018 03:32 PM    Bilirubin NEGATIVE  03/31/2018 03:32 PM    Urobilinogen 0.2 03/31/2018 03:32 PM    Nitrites POSITIVE (A) 03/31/2018 03:32 PM    Leukocyte Esterase LARGE (A) 03/31/2018 03:32 PM    Epithelial cells FEW 03/31/2018 03:32 PM    Bacteria 2+ (A) 03/31/2018 03:32 PM    WBC >100 (H) 03/31/2018 03:32 PM    RBC 0-5 03/31/2018 03:32 PM         Medications Reviewed:     Current Facility-Administered Medications   Medication Dose Route Frequency    donepezil (ARICEPT) tablet 5 mg  5 mg Oral QHS    traZODone (DESYREL) tablet 50 mg  50 mg Oral QHS PRN    fish oil-omega-3 fatty acids 340-1,000 mg capsule 1 Cap  1 Cap Oral DAILY    sodium chloride (NS) flush 5-10 mL  5-10 mL IntraVENous Q8H    sodium chloride (NS) flush 5-10 mL  5-10 mL IntraVENous PRN    cefTRIAXone (ROCEPHIN) 1 g in 0.9% sodium chloride (MBP/ADV) 50 mL  1 g IntraVENous Q24H    0.9% sodium chloride infusion  100 mL/hr IntraVENous CONTINUOUS    acetaminophen (TYLENOL) tablet 650 mg  650 mg Oral Q4H PRN    LORazepam (ATIVAN) injection 1 mg  1 mg IntraVENous Q8H PRN    enoxaparin (LOVENOX) injection 40 mg  40 mg SubCUTAneous Q24H     ______________________________________________________________________  EXPECTED LENGTH OF STAY: 3d 21h  ACTUAL LENGTH OF STAY:          2                 Raoul Adrian MD

## 2018-04-02 NOTE — PROGRESS NOTES
Orders received, chart reviewed and patient very familiar to me from last admission 2/25/18. Patient with baseline dementia and at this stage patient is mostly non-verbal. Patient this admission completing baseline (supervision for functional mobility and setup to total A basic ADLs; and actually speaking some with nursing). Discharging skilled acute OT services as family and aid A with all ADLs and patient is performing household distance functional mobility and transfers at baseline. PT is following. PLOF 2/25/18  Prior Level of Function/Environment/Context: functional mobility with RW and A, setup to total A ADLs pending patient desire to complete but mostly total A. Spouse is there 24/7 and daughter A PRN.    Occupations in which the patient is/was successful, what are the barriers preventing that success:   Performance Patterns (routines, roles, habits, and rituals):   Personal Interests and/or values:   Expanded or extensive additional review of patient history:      Home Situation  Home Environment: Private residence  # Steps to Enter: 3 (patient's daughter reports they carry her up the steps)  One/Two Story Residence: Two story, live on 1st floor  Living Alone: No  Support Systems: Spouse/Significant Other/Partner, Family member(s)  Patient Expects to be Discharged to[de-identified] Private residence  Current DME Used/Available at Home: [employer], rolling  Tub or Shower Type: Shower

## 2018-04-02 NOTE — PROGRESS NOTES
Pt has elevated BP, daughter came in and we discussed home medications. She states pt does not take Sinemet at home and pt is on Trazodone, Aricept. On call MD Aponte called. MD ordered Hydralazine 10 mg IV once, okay to discontinue Sinemet, and pt not taking Trazodone or Aricept due to need for further eval of mental status.

## 2018-04-02 NOTE — PROGRESS NOTES
Speech Pathology    Consult received and appreciated. Chart reviewed. Patient on a regular diet. Spoke with RN who deny any concerns re: swallowing. Intake is variable based on what patient wants to eat. Confused and did attempt to chew Omega-3 pill this morning. RN to clarify with daughter how patient takes pills at home. No formal SLP evaluation warranted at this time. Signing off. Daniella Fisher MS, CCC-SLP, BCS-S

## 2018-04-02 NOTE — PROGRESS NOTES
Patient pulled IV from L hand. Fourth IV in 24 hours. MD notified, advised to restart IV once patient is less agitated. Will continue to monitor.

## 2018-04-02 NOTE — PROGRESS NOTES
Problem: Mobility Impaired (Adult and Pediatric)  Goal: *Acute Goals and Plan of Care (Insert Text)  Physical Therapy Goals  Initiated 4/1/2018  1. Patient will move from supine to sit and sit to supine  in bed with supervision/set-up within 7 day(s). 2.  Patient will transfer from bed to chair and chair to bed with supervision/set-up using the least restrictive device within 7 day(s). 3.  Patient will perform sit to stand with supervision/set-up within 7 day(s). 4.  Patient will ambulate with supervision/set-up for 150 feet with the least restrictive device within 7 day(s). physical Therapy TREATMENT  Patient: Pema Hopper (77 y.o. female)  Date: 4/2/2018  Diagnosis: UTI (urinary tract infection) UTI (urinary tract infection)       Precautions: Fall  Chart, physical therapy assessment, plan of care and goals were reviewed. ASSESSMENT:  Pt received supine in bed and continues to be unable to use blue phone for communication. Command following limited by this. Pt quickly came to sitting EOB and assisted into the bathroom with Min A d/t initial instability when standing. Pt displayed good balance and able to complete all toileting and standing sink ADLs with supervision. While standing at the sink pt began to run through what seemed like an exercise routine(marching in place, tapping feet, rolling shoulders/neck, raising arms above head and clapping, pulling on earlobes). Required extensive time to redirect and finish tasks at the sink. Pt then ambulated ~100ft in the hallway with CGA and manual cues to redirect pt from entering other pt's room. Feel pt is probably near her baseline and is safe to return home with her daughter at discharge.   Progression toward goals:  [x]    Improving appropriately and progressing toward goals  []    Improving slowly and progressing toward goals  []    Not making progress toward goals and plan of care will be adjusted     PLAN:  Patient continues to benefit from skilled intervention to address the above impairments. Continue treatment per established plan of care. Discharge Recommendations:  Home Health and None(unsure of what daughter needs for pt's care)  Further Equipment Recommendations for Discharge:  none     SUBJECTIVE:   Patient stated . Speaking in Mandarin    OBJECTIVE DATA SUMMARY:   Critical Behavior:  Neurologic State: Alert  Orientation Level: Disoriented to situation, Disoriented to place  Cognition: Unable to assess (comment)     Functional Mobility Training:  Bed Mobility:     Supine to Sit: Stand-by assistance  Sit to Supine: Stand-by assistance           Transfers:  Sit to Stand: Contact guard assistance  Stand to Sit: Contact guard assistance                             Balance:  Sitting: Intact; With support  Standing: Impaired; Without support  Standing - Static: Good  Standing - Dynamic : Fair  Ambulation/Gait Training:  Distance (ft): 120 Feet (ft)  Assistive Device: Gait belt (HHA)  Ambulation - Level of Assistance: Minimal assistance;Contact guard assistance        Gait Abnormalities: Decreased step clearance        Base of Support: Narrowed     Speed/Loan: Fluctuations                       Activity Tolerance:   Good  Please refer to the flowsheet for vital signs taken during this treatment.   After treatment:   []    Patient left in no apparent distress sitting up in chair  [x]    Patient left in no apparent distress in bed  [x]    Call bell left within reach  [x]    Nursing notified  []    Caregiver present  []    Bed alarm activated    COMMUNICATION/COLLABORATION:   The patients plan of care was discussed with: Registered Nurse    Mario Lopez PT   Time Calculation: 28 mins

## 2018-04-02 NOTE — PROGRESS NOTES
Bedside and Verbal shift change report given to Quinton Recinos (oncoming nurse) by Kizzy Claudio (offgoing nurse). Report included the following information SBAR, Kardex, ED Summary, Procedure Summary, Intake/Output, MAR and Recent Results.

## 2018-04-03 ENCOUNTER — DOCUMENTATION ONLY (OUTPATIENT)
Dept: INTERNAL MEDICINE CLINIC | Age: 73
End: 2018-04-03

## 2018-04-03 PROCEDURE — 74011250637 HC RX REV CODE- 250/637: Performed by: HOSPITALIST

## 2018-04-03 PROCEDURE — 65210000002 HC RM PRIVATE GYN

## 2018-04-03 PROCEDURE — 74011000250 HC RX REV CODE- 250: Performed by: HOSPITALIST

## 2018-04-03 PROCEDURE — 74011250636 HC RX REV CODE- 250/636: Performed by: HOSPITALIST

## 2018-04-03 RX ORDER — CARBIDOPA AND LEVODOPA 25; 100 MG/1; MG/1
1.5 TABLET ORAL 3 TIMES DAILY
Status: DISCONTINUED | OUTPATIENT
Start: 2018-04-03 | End: 2018-04-06 | Stop reason: HOSPADM

## 2018-04-03 RX ADMIN — DONEPEZIL HYDROCHLORIDE 5 MG: 5 TABLET, FILM COATED ORAL at 22:07

## 2018-04-03 RX ADMIN — LIDOCAINE HYDROCHLORIDE 1 G: 10 INJECTION, SOLUTION EPIDURAL; INFILTRATION; INTRACAUDAL; PERINEURAL at 18:32

## 2018-04-03 RX ADMIN — TRAZODONE HYDROCHLORIDE 50 MG: 50 TABLET ORAL at 22:11

## 2018-04-03 NOTE — PROGRESS NOTES
Bedside and Verbal shift change report given to Rose Avila (oncoming nurse) by Jan Walker (offgoing nurse). Report included the following information SBAR, Kardex, ED Summary, Procedure Summary, Intake/Output, MAR and Recent Results.

## 2018-04-03 NOTE — PROGRESS NOTES
Hospitalist Progress Note  Steven Fernández MD        Answering service: [phone]  [phone] from in house phone    Date of Service:  4/3/2018  NAME:  Hope Hopper  :  1945  MRN:  [id number]      Admission Summary: This is a 65-year-old [facility] female with past medical history significant for Parkinson disease, dementia, recurrent urinary tract infection and osteopenia, who is brought to [facility] Emergency Department for altered mental status, 3 days' duration. The patient is a poor historian and information is obtained from her daughter at the bedside and reviewing her medical record. The patient had a fever last week and her daughter gave her Advil and her temperature improved. Three to five days ago she became agitated, restless, which progressively worsened and decided to bring her to [facility] Emergency Department. She had a fall yesterday and today x2. She has also lung congestion, cough, and poor appetite. No nausea, vomiting, left-sided chest pain, abdominal pain or abnormal bowel movement. But she has not been eating or drinking that much for the last couple of days. In the ER when she arrived, her vital signs, blood pressure 138/80, pulse 86, temperature 98.2, saturation of oxygen 97% on room air. CT of the head without contrast was done and no acute intracranial process. Chest x-ray was done and no acute cardiopulmonary disease. The patient received IV ceftriaxone and referred to hospitalist service for further evaluation and admission. Interval history / Subjective:   Ms. Ryan Jc He is smiling but cannot offer much in the way of history. She refuses the  phone. Assessment & Plan:   Recurrent urinary tract infection, present on admission  - Urine cx 3/31 with E coli  - Blood cx 3/31 no growth  - Continue ceftriaxone started 3/31, will need longer course due to urinary retention.  Patient unwilling to take PO meds at home    Urinary retention (POA) - unclear cause  - Renal US 4/1 shows mild bilateral hydroureteronephrosis and distended bladder  - Bladder scan with straight cath as needed  - Urology consulted    Acute metabolic encephalopathy due to urinary tract infection in the setting of advanced dementia (POA) - Mental status improving per family. - CT head 3/31 with chronic small vessel ischemic disease, no acute process    Parkinson disease (chronic) - restart home sinemet    Dementia with abnormal behavior (chronic)  - Continue supportive care, p.r.n. Ativan for agitation. Recurrent Fall - due to parkinson  - Fall precaution  - PT/OT but patient not very willing to participate    Diet:regular  Code status: full  DVT prophylaxis: lovenox  Care Plan discussed with: talked with daughter on the phone     Discharge planning/disposition:  Home with family possible tomorrow after urology eval.She would have received 3 doses of ceftriaxone by them    Hospital Problems  Date Reviewed: 4/1/2018          Codes Class Noted POA    * (Principal)UTI (urinary tract infection) ICD-10-CM: N39.0  ICD-9-CM: 599.0  3/31/2018 Unknown                Review of Systems:   Review of systems not obtained due to patient factors. Physical Examination:      Last 24hrs VS reviewed since prior progress note. Most recent are:  Visit Vitals    /72 (BP 1 Location: Right arm, BP Patient Position: At rest;Supine)    Pulse 75    Temp 97.9 °F (36.6 °C)    Resp 16    Ht 5' (1.524 m)    Wt 50.8 kg (112 lb)    SpO2 96%    BMI 21.87 kg/m2           Constitutional:  No acute distress. HEENT: Head is atraumatic, sclera anicteric. Oral mucous moist, oropharynx benign. Neck supple,    Resp:  CTA bilaterally. No wheezing/rhonchi/rales. No accessory muscle use   CV:  Regular rhythm, normal rate, no murmurs, gallops, rubs    GI:  Soft, non distended, non tender.  normoactive bowel sounds, no hepatosplenomegaly    :  No CVA or suprapubic tenderness   Skin  :  No erythema,rash,bullae,dipigmentation     Musculoskeletal:  No edema, warm, 2+ pulses throughout    Neurologic: Awake. Smiling. Moves all extremities. Cogwheel rigidity. Intake/Output Summary (Last 24 hours) at 04/03/18 1226  Last data filed at 04/02/18 2136   Gross per 24 hour   Intake          1591.67 ml   Output              800 ml   Net           791.67 ml          Data Review:     Telemetry    ECG    Xray    CT scan    MRI    Echocardiogram    Ultrasound              I have reviewed the flow sheet and recent notes  New labs and data personally reviewed. Labs:     Recent Labs      04/01/18 0238 03/31/18   1337   WBC  6.3  7.4   HGB  12.3  12.7   HCT  37.7  38.7   PLT  231  245     Recent Labs      04/01/18 0238 03/31/18   1337   NA  142  136   K  3.6  4.8   CL  108  104   CO2  27  26   BUN  11  17   CREA  0.76  0.95   GLU  96  102*   CA  8.7  8.9     Recent Labs      04/01/18 0238 03/31/18   1337   SGOT  25  34   ALT  10*  25   AP  56  57   TBILI  0.8  0.9   TP  7.0  7.6   ALB  2.9*  3.2*   GLOB  4.1*  4.4*     No results for input(s): INR, PTP, APTT in the last 72 hours. No lab exists for component: INREXT, INREXT   No results for input(s): FE, TIBC, PSAT, FERR in the last 72 hours. No results found for: FOL, RBCF   No results for input(s): PH, PCO2, PO2 in the last 72 hours.   Recent Labs      03/31/18   1337   TROIQ  <0.04     Lab Results   Component Value Date/Time    Cholesterol, total 137 02/22/2018 10:22 PM    HDL Cholesterol 64 02/22/2018 10:22 PM    LDL, calculated 59.4 02/22/2018 10:22 PM    Triglyceride 68 02/22/2018 10:22 PM    CHOL/HDL Ratio 2.1 02/22/2018 10:22 PM     Lab Results   Component Value Date/Time    Glucose (POC) 79 02/22/2018 06:27 PM     Lab Results   Component Value Date/Time    Color YELLOW/STRAW 03/31/2018 03:32 PM    Appearance CLOUDY (A) 03/31/2018 03:32 PM    Specific gravity 1.015 03/31/2018 03:32 PM    pH (UA) 7.0 03/31/2018 03:32 PM    Protein NEGATIVE  03/31/2018 03:32 PM    Glucose NEGATIVE  03/31/2018 03:32 PM    Ketone NEGATIVE  03/31/2018 03:32 PM    Bilirubin NEGATIVE  03/31/2018 03:32 PM    Urobilinogen 0.2 03/31/2018 03:32 PM    Nitrites POSITIVE (A) 03/31/2018 03:32 PM    Leukocyte Esterase LARGE (A) 03/31/2018 03:32 PM    Epithelial cells FEW 03/31/2018 03:32 PM    Bacteria 2+ (A) 03/31/2018 03:32 PM    WBC >100 (H) 03/31/2018 03:32 PM    RBC 0-5 03/31/2018 03:32 PM         Medications Reviewed:     Current Facility-Administered Medications   Medication Dose Route Frequency    donepezil (ARICEPT) tablet 5 mg  5 mg Oral QHS    traZODone (DESYREL) tablet 50 mg  50 mg Oral QHS PRN    fish oil-omega-3 fatty acids 340-1,000 mg capsule 1 Cap  1 Cap Oral DAILY    sodium chloride (NS) flush 5-10 mL  5-10 mL IntraVENous Q8H    sodium chloride (NS) flush 5-10 mL  5-10 mL IntraVENous PRN    cefTRIAXone (ROCEPHIN) 1 g in 0.9% sodium chloride (MBP/ADV) 50 mL  1 g IntraVENous Q24H    0.9% sodium chloride infusion  100 mL/hr IntraVENous CONTINUOUS    acetaminophen (TYLENOL) tablet 650 mg  650 mg Oral Q4H PRN    LORazepam (ATIVAN) injection 1 mg  1 mg IntraVENous Q8H PRN    enoxaparin (LOVENOX) injection 40 mg  40 mg SubCUTAneous Q24H     ______________________________________________________________________  EXPECTED LENGTH OF STAY: 3d 21h  ACTUAL LENGTH OF STAY:          3                 Saima Amaro MD

## 2018-04-03 NOTE — PROGRESS NOTES
Physical Therapy:    Patient cleared by RN for OOB activity. Received supine with an an orange clenched in each hand and a soda can under her forearm tightly against her torso. No family available and patient declining to use blue phone when presented to her. Patient not responding to any verbal, tactile cues, demonstration of desired tasks (getting out of bed, using the walker, etc.). Patient continued to keep eyes clenched tight most of the time but clearly awake. PT attempted to gently pull back her covers but patient resisted this effort. After trying for several minutes, further effort to rouse and persuade participation deferred. Will attempt to f/u later today as schedule allows and as appropriate.     Cierra Saleem, MS, PT

## 2018-04-03 NOTE — PROGRESS NOTES
Bedside and Verbal shift change report given to Jennifer HOSKINS (oncoming nurse) by Juan Tucker (offgoing nurse). Report included the following information SBAR, Kardex, Intake/Output, MAR, Accordion and Recent Results.

## 2018-04-03 NOTE — PROGRESS NOTES
Pt's bed alarm is going off and pt has feet hanging out of bed. I put her back in bed and found pt clutching a metal kitchen knife wrapped in napkins in her left hand. I removed the knife from her hands and took it out of the room.

## 2018-04-03 NOTE — PROGRESS NOTES
04/03/2018; 15:30 -   CM met with patient, family friend, patient's daughter, and spouse at bedside. The patient is not alert and oriented due to her dementia. Family friend helped to clarify the patient's insurance coverage, and CM obtained front/back copies of both cards. For Medicare, the patient has an [employer], and for Medicaid, the patient has an [employer]. The family would like to list the family friend on a medical release form for the patient. Additionally, CM noted that the DNR order has been filled out incorrectly, listing the patient's agents in the incorrect place. Per the family, they would like to discuss the following options with the attending physician: a long term placement vs. discharging home with home health, to include IV hydration vs. the possibility of a hospice placement. The patient is refusing to eat and does not want to be fed via a tube. During this hospitalization, the patient has also refused medications and testing. CM counseled the family that due to a family member currently receiving payments to be the patient's caregiver she likely will not qualify for additional aid based assistance. Also, CM provided family with a list of [facility] care facilities in the general vicinity of the patient's home. CM has requested that the family review these types of facilities and provide this CM with a list of 3-4 locations that they would be okay with. CM to notify attending physician of requests to discuss. NOTE: The patient speaks Cantonese. NOTE: The patient's  does not speak [place].     CRM: Ival Nissen, MPH; Z: [phone]

## 2018-04-03 NOTE — PROGRESS NOTES
Hospital follow-up PCP transitional care appointment has been scheduled with Dr. Leticia Cordero for Monday, 4/9/18 at 9:00 a.m. Pending patient discharge.   Rhina Vogel, Care Management Specialist.

## 2018-04-03 NOTE — PROGRESS NOTES
Problem: Falls - Risk of  Goal: *Absence of Falls  Document Genoveva Fall Risk and appropriate interventions in the flowsheet. Outcome: Progressing Towards Goal  Fall Risk Interventions:  Mobility Interventions: Bed/chair exit alarm    Mentation Interventions: Bed/chair exit alarm    Medication Interventions: Bed/chair exit alarm    Elimination Interventions:  Toileting schedule/hourly rounds, Call light in reach    History of Falls Interventions: Bed/chair exit alarm, Room close to nurse's station, Door open when patient unattended

## 2018-04-03 NOTE — PROGRESS NOTES
Bedside and Verbal shift change report given to Laura Patterson RN (oncoming nurse) by Valentine Welch RN (offgoing nurse). Report included the following information SBAR, Kardex, Procedure Summary, Intake/Output, MAR, Accordion and Recent Results.
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
## 2018-04-04 PROCEDURE — 74011250636 HC RX REV CODE- 250/636: Performed by: HOSPITALIST

## 2018-04-04 PROCEDURE — 65210000002 HC RM PRIVATE GYN

## 2018-04-04 PROCEDURE — 74011250637 HC RX REV CODE- 250/637: Performed by: HOSPITALIST

## 2018-04-04 PROCEDURE — 74011000250 HC RX REV CODE- 250: Performed by: HOSPITALIST

## 2018-04-04 RX ORDER — LORAZEPAM 2 MG/ML
1 CONCENTRATE ORAL
Status: DISCONTINUED | OUTPATIENT
Start: 2018-04-04 | End: 2018-04-06 | Stop reason: HOSPADM

## 2018-04-04 RX ADMIN — TRAZODONE HYDROCHLORIDE 50 MG: 50 TABLET ORAL at 23:03

## 2018-04-04 RX ADMIN — LIDOCAINE HYDROCHLORIDE 1 G: 10 INJECTION, SOLUTION EPIDURAL; INFILTRATION; INTRACAUDAL; PERINEURAL at 16:07

## 2018-04-04 NOTE — PROGRESS NOTES
Bedside and Verbal shift change report given to Camacho Patel (oncoming nurse) by Apple Sepulveda (offgoing nurse). Report included the following information SBAR, Kardex, Intake/Output, MAR, Accordion and Recent Results.

## 2018-04-04 NOTE — PROGRESS NOTES
Hospitalist Progress Note  Opal Ma MD        Answering service: [phone]  [phone] from in house phone    Date of Service:  2018  NAME:  Margarita Hopper  :  1945  MRN:  [id number]      Admission Summary: This is a 67-year-old [facility] female with past medical history significant for Parkinson disease, dementia, recurrent urinary tract infection and osteopenia, who is brought to [facility] Emergency Department for altered mental status, 3 days' duration. The patient is a poor historian and information is obtained from her daughter at the bedside and reviewing her medical record. The patient had a fever last week and her daughter gave her Advil and her temperature improved. Three to five days ago she became agitated, restless, which progressively worsened and decided to bring her to [facility] Emergency Department. She had a fall yesterday and today x2. She has also lung congestion, cough, and poor appetite. No nausea, vomiting, left-sided chest pain, abdominal pain or abnormal bowel movement. But she has not been eating or drinking that much for the last couple of days. In the ER when she arrived, her vital signs, blood pressure 138/80, pulse 86, temperature 98.2, saturation of oxygen 97% on room air. CT of the head without contrast was done and no acute intracranial process. Chest x-ray was done and no acute cardiopulmonary disease. The patient received IV ceftriaxone and referred to hospitalist service for further evaluation and admission. Interval history / Subjective:   Ms. Juan Wright He cannot provide any history.      Assessment & Plan:   Recurrent urinary tract infection, present on admission  - Urine cx 3/31 with E coli  - Blood cx 3/31 no growth  - Last dose of ceftriaxone today    Urinary retention (POA) - perhaps due to UTI, resolved  - Renal US  shows mild bilateral hydroureteronephrosis and distended bladder  - Patient agitated by bladder scans  - Urology consulted    Acute metabolic encephalopathy due to urinary tract infection in the setting of advanced dementia (POA) - Mental status improving per family. - CT head 3/31 with chronic small vessel ischemic disease, no acute process    Parkinson disease (chronic) - restart home sinemet    Dementia with abnormal behavior (chronic)  - Continue supportive care, p.r.n. Ativan for agitation.  - Palliative care consulted    Recurrent Fall - due to parkinson  - Fall precaution  - PT/OT but patient not very willing to participate    Diet:regular  Code status: full  DVT prophylaxis: lovenox  Care Plan discussed with: daughter    Discharge planning/disposition:  Home with family tomorrow    Hospital Problems  Date Reviewed: 4/1/2018          Codes Class Noted POA    * (Principal)UTI (urinary tract infection) ICD-10-CM: N39.0  ICD-9-CM: 599.0  3/31/2018 Unknown                Review of Systems:   Review of systems not obtained due to patient factors. Physical Examination:      Last 24hrs VS reviewed since prior progress note. Most recent are:  Visit Vitals    /72 (BP 1 Location: Right arm, BP Patient Position: At rest;Supine)    Pulse 75    Temp 98.8 °F (37.1 °C)    Resp 16    Ht 5' (1.524 m)    Wt 50.8 kg (112 lb)    SpO2 96%    BMI 21.87 kg/m2           Constitutional:  No acute distress. HEENT: Head is atraumatic, sclera anicteric. Oral mucous moist, oropharynx benign. Neck supple,    Resp:  CTA bilaterally. No wheezing/rhonchi/rales. No accessory muscle use   CV:  Regular rhythm, normal rate, no murmurs, gallops, rubs    GI:  Soft, non distended, non tender. normoactive bowel sounds, no hepatosplenomegaly    :  No CVA or suprapubic tenderness   Skin  :  No erythema,rash,bullae,dipigmentation     Musculoskeletal:  No edema, warm, 2+ pulses throughout    Neurologic: Awake. Smiling. Moves all extremities. Cogwheel rigidity.            No intake or output data in the 24 hours ending 04/04/18 3587       Data Review:     Telemetry    ECG    Xray    CT scan    MRI    Echocardiogram    Ultrasound              I have reviewed the flow sheet and recent notes  New labs and data personally reviewed. Labs:     No results for input(s): WBC, HGB, HCT, PLT, HGBEXT, HCTEXT, PLTEXT, HGBEXT, HCTEXT, PLTEXT in the last 72 hours. No results for input(s): NA, K, CL, CO2, BUN, CREA, GLU, CA, MG, PHOS, URICA in the last 72 hours. No results for input(s): SGOT, GPT, ALT, AP, TBIL, TBILI, TP, ALB, GLOB, GGT, AML, LPSE in the last 72 hours. No lab exists for component: AMYP, HLPSE  No results for input(s): INR, PTP, APTT in the last 72 hours. No lab exists for component: INREXT, INREXT   No results for input(s): FE, TIBC, PSAT, FERR in the last 72 hours. No results found for: FOL, RBCF   No results for input(s): PH, PCO2, PO2 in the last 72 hours. No results for input(s): CPK, CKNDX, TROIQ in the last 72 hours.     No lab exists for component: CPKMB  Lab Results   Component Value Date/Time    Cholesterol, total 137 02/22/2018 10:22 PM    HDL Cholesterol 64 02/22/2018 10:22 PM    LDL, calculated 59.4 02/22/2018 10:22 PM    Triglyceride 68 02/22/2018 10:22 PM    CHOL/HDL Ratio 2.1 02/22/2018 10:22 PM     Lab Results   Component Value Date/Time    Glucose (POC) 79 02/22/2018 06:27 PM     Lab Results   Component Value Date/Time    Color YELLOW/STRAW 03/31/2018 03:32 PM    Appearance CLOUDY (A) 03/31/2018 03:32 PM    Specific gravity 1.015 03/31/2018 03:32 PM    pH (UA) 7.0 03/31/2018 03:32 PM    Protein NEGATIVE  03/31/2018 03:32 PM    Glucose NEGATIVE  03/31/2018 03:32 PM    Ketone NEGATIVE  03/31/2018 03:32 PM    Bilirubin NEGATIVE  03/31/2018 03:32 PM    Urobilinogen 0.2 03/31/2018 03:32 PM    Nitrites POSITIVE (A) 03/31/2018 03:32 PM    Leukocyte Esterase LARGE (A) 03/31/2018 03:32 PM    Epithelial cells FEW 03/31/2018 03:32 PM    Bacteria 2+ (A) 03/31/2018 03:32 PM    WBC >100 (H) 03/31/2018 03:32 PM    RBC 0-5 03/31/2018 03:32 PM         Medications Reviewed:     Current Facility-Administered Medications   Medication Dose Route Frequency    carbidopa-levodopa (SINEMET)  mg per tablet 1.5 Tab  1.5 Tab Oral TID    donepezil (ARICEPT) tablet 5 mg  5 mg Oral QHS    traZODone (DESYREL) tablet 50 mg  50 mg Oral QHS PRN    fish oil-omega-3 fatty acids 340-1,000 mg capsule 1 Cap  1 Cap Oral DAILY    sodium chloride (NS) flush 5-10 mL  5-10 mL IntraVENous Q8H    sodium chloride (NS) flush 5-10 mL  5-10 mL IntraVENous PRN    0.9% sodium chloride infusion  100 mL/hr IntraVENous CONTINUOUS    acetaminophen (TYLENOL) tablet 650 mg  650 mg Oral Q4H PRN    LORazepam (ATIVAN) injection 1 mg  1 mg IntraVENous Q8H PRN    enoxaparin (LOVENOX) injection 40 mg  40 mg SubCUTAneous Q24H     ______________________________________________________________________  EXPECTED LENGTH OF STAY: 3d 21h  ACTUAL LENGTH OF STAY:          4                 Cathy Stephen MD

## 2018-04-04 NOTE — PROGRESS NOTES
This am, per nsg, pt declining all cares and was awaiting family member who does speak English to arrive at 2. Pt currently asleep after meeting with the palliative care dr. Gayle Costa friend and daughter present asking if pt can go home (deferred to MD). Will defer tx for today. Per PT note on 4/2, when pt agreeable to OOB pt was ambulating approx 100 feet in hallway with CGA and was likely close to baseline. PT at that time stated pt probably safe for discharge home in care of daughter.

## 2018-04-04 NOTE — PROGRESS NOTES
Problem: Pain  Goal: *Control of Pain  Outcome: Progressing Towards Goal  Goal assessed using adult nonverbal scale.

## 2018-04-04 NOTE — PROGRESS NOTES
Spoke with patient's daughter re: patient's care.  She stated a desire to discuss several things with the MD/case management, including:    - Request for DME like an elevated toilet seat and gripper railings (for the bathroom wall)  - Getting a liquid antibiotic (due to pt's frequent UTIs)  - Options for quick denture coverage through her insurance so dentures can be replaced

## 2018-04-04 NOTE — PROGRESS NOTES
Spiritual Care Assessment/Progress Note  [facility]      NAME: Adelfo Hopper      MRN: [id number]  AGE: 67 y.o. SEX: female  Baptist Affiliation: No Baptist   Language: Chinese     4/4/2018     Total Time (in minutes): 10     Spiritual Assessment begun in [address] [place] [address] TELEMETRY through conversation with:         [x]Patient        [] Family    [] Friend(s)        Reason for Consult: Palliative Care, Initial/Spiritual Assessment     Spiritual beliefs: (Please include comment if needed)     [] Involved in a pratima tradition/spiritual practice:     [] Supported by a pratima community:      [] Claims no spiritual orientation:      [] Seeking spiritual identity:           [] Adheres to an individual form of spirituality:      [x] Not able to assess:                     Identified resources for coping:      [] Prayer                  [] Devotional reading               [] Music                  [] Guided Imagery     [] Family/friends                 [] Pet visits     [] Other:        Interventions offered during this visit: (See comments for more details)    Patient Interventions: Initial visit           Plan of Care:     [] Discuss Spiritual/Cultural needs    [] Support AMD and/or advance care planning process      [] Support grieving process   [] Coordinate Rites/Rituals    [] Coordination with community clergy   [] No spiritual needs identified at this time   [] Detailed Plan of Care below (See Comments)  [] Make referral to Music Therapy  [] Make referral to Pet Therapy     [] Make referral to Addiction services  [] Make referral to [facility]  [] Make referral to Spiritual Care Partner  [] No future visits requested             Comments:  visit per palliative consult. Was not able to communicate with pt. Spoke with nurse and family should be around this afternoon.  follow up as needed.      Alka Zelaya, [facility]   [phone])
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
## 2018-04-05 ENCOUNTER — NURSE NAVIGATOR (OUTPATIENT)
Dept: PALLATIVE CARE | Age: 73
End: 2018-04-05

## 2018-04-05 VITALS
OXYGEN SATURATION: 96 % | DIASTOLIC BLOOD PRESSURE: 78 MMHG | HEART RATE: 77 BPM | HEIGHT: 60 IN | TEMPERATURE: 97.9 F | SYSTOLIC BLOOD PRESSURE: 137 MMHG | RESPIRATION RATE: 16 BRPM | BODY MASS INDEX: 21.99 KG/M2 | WEIGHT: 112 LBS

## 2018-04-05 PROCEDURE — 76450000000

## 2018-04-05 RX ORDER — DONEPEZIL HYDROCHLORIDE 5 MG/1
5 TABLET, ORALLY DISINTEGRATING ORAL
Qty: 30 TAB | Refills: 0 | Status: SHIPPED | OUTPATIENT
Start: 2018-04-05 | End: 2019-01-07

## 2018-04-05 RX ORDER — CARBIDOPA AND LEVODOPA 25; 100 MG/1; MG/1
1 TABLET, ORALLY DISINTEGRATING ORAL 3 TIMES DAILY
Qty: 90 TAB | Refills: 0 | Status: SHIPPED | OUTPATIENT
Start: 2018-04-05 | End: 2019-01-07

## 2018-04-05 RX ORDER — LORAZEPAM 2 MG/ML
1 CONCENTRATE ORAL
Qty: 30 ML | Refills: 0 | Status: SHIPPED | OUTPATIENT
Start: 2018-04-05 | End: 2019-01-07

## 2018-04-05 NOTE — PROGRESS NOTES
Bedside and Verbal shift change report given to Samantha Cochran RN (oncoming nurse) by Naila Kang RN (offgoing nurse). Report included the following information SBAR, Kardex, Procedure Summary, Intake/Output, MAR, Accordion and Recent Results.

## 2018-04-05 NOTE — PROGRESS NOTES
Home Based Primary Care & Supportive Services   (previously: At Home)  [phone] [facility], [address]      Received email from Maryella Soulier, MD (inpatient Palliative Medicine team) inquiring about a Home Based Primary Care referral for patient. According to [facility], pt has [employer] replacement insurance which does not participate with [facility]. Haroldo Dong CM at [address] [place] [address] met with pt's dtr, spouse and a family friend. See her 4/3/18 note for details:  Family friend helped to clarify the patient's insurance coverage, and CM obtained front/back copies of both cards. For Medicare, the patient has an [address], and for Medicaid, the patient has an [place]. This nurse called and left message with Ms. Alber Drake to explain what outpatient services we could offer ([facility] or Palliative Home) if pt is discharged back home. Our PSR would need to check and see if [facility] or Palliative Home are in-network with [employer].       Tal Chin RN  Referral Navigator, Home Based Primary Care & Supportive Services

## 2018-04-05 NOTE — DISCHARGE SUMMARY
Discharge Summary     Patient:  Nereyda Hopper       MRN: [id number]       YOB: 1945       Age: 67 y.o. Date of admission:  3/31/2018    Date of discharge:  4/5/2018    Primary care provider: Dr. Rhona Patel MD    Admitting provider:  Tiff Cheng MD    Discharging provider:  Sachin Shields [address] [phone]. If unavailable, call [phone] and ask the  to page the triage hospitalist.    Consultations  IP CONSULT TO UROLOGY  IP CONSULT TO PALLIATIVE CARE - PROVIDER    Procedures  * No surgery found *    Discharge destination: Home. The patient is stable for discharge. Admission diagnosis  UTI (urinary tract infection)    Current Discharge Medication List      START taking these medications    Details   LORazepam (INTENSOL) 2 mg/mL concentrated solution Take 0.5 mL by mouth every eight (8) hours as needed. Max Daily Amount: 3 mg. For anxiety, agitation  Qty: 30 mL, Refills: 0    Associated Diagnoses: Dementia due to Parkinson's disease with behavioral disturbance (HCC)      carbidopa-levodopa (PARCOPA)  mg rapid dissolve tablet Take 1 Tab by mouth three (3) times daily. Qty: 90 Tab, Refills: 0      donepezil 5 mg TbDi Take 5 mg by mouth nightly. Qty: 30 Tab, Refills: 0      traZODone (DESYREL) 10 mg/mL susp 10 mg/mL oral suspension (compounded) Take 5 mL by mouth nightly. Qty: 450 mL, Refills: 0         STOP taking these medications       donepezil (ARICEPT) 5 mg tablet Comments:   Reason for Stopping:         traZODone (DESYREL) 50 mg tablet Comments:   Reason for Stopping:         GLUCOSAMINE HCL PO Comments:   Reason for Stopping:         fish oil-omega-3 fatty acids 340-1,000 mg capsule Comments:   Reason for Stopping:         carbidopa-levodopa (SINEMET)  mg per tablet Comments:   Reason for Stopping:                Follow-up Information     Follow up With Details Comments Contact Info    Genet Mckinley MD Go on 4/9/2018 Hospital follow up PCP appointment on Monday, 4/9/18 @ 9:00 a.m.  Fredo Thibodeaux [address] [facility]  [phone]      Jeni Lazaro MD Schedule an appointment as soon as possible for a visit in 2 weeks For follow up of urinary retention [address]  [phone]            Future Appointments  Date Time Provider Kyler Lianna   4/9/2018 9:00 AM Genet Mckinley MD [facility]     Final discharge diagnoses and brief hospital course  Please also refer to the admission H&P for details on the presenting problem. This is a 76-year-old [facility] female with past medical history significant for Parkinson disease, dementia, recurrent urinary tract infection and osteopenia, who is brought to [facility] for altered mental status, 3 days' duration. The patient is a poor historian and information is obtained from her daughter at the bedside and reviewing her medical record. The patient had a fever last week and her daughter gave her Advil and her temperature improved. Three to five days ago she became agitated, restless, which progressively worsened and decided to bring her to [facility]. She had a fall yesterday and today x2. She has also lung congestion, cough, and poor appetite. No nausea, vomiting, left-sided chest pain, abdominal pain or abnormal bowel movement. But she has not been eating or drinking that much for the last couple of days. In the ER when she arrived, her vital signs, blood pressure 138/80, pulse 86, temperature 98.2, saturation of oxygen 97% on room air. CT of the head without contrast was done and no acute intracranial process. Chest x-ray was done and no acute cardiopulmonary disease.  The patient received IV ceftriaxone and referred to hospitalist service for further evaluation and admission. Recurrent urinary tract infection, present on admission  - Urine cx 3/31 with E coli  - Blood cx 3/31 no growth  - Ceftriaxone 3/31-4/4     Urinary retention (POA) - perhaps due to UTI, resolved  - Renal US 4/1 shows mild bilateral hydroureteronephrosis and distended bladder  - Patient agitated by bladder scans  - Urology consulted - follow up [address] and see Urology     Acute metabolic encephalopathy due to urinary tract infection in the setting of advanced dementia (POA) - Mental status improving per family. - CT head 3/31 with chronic small vessel ischemic disease, no acute process     Parkinson disease (chronic) - restart home sinemet     Dementia with abnormal behavior (chronic)  - Continue supportive care, p.r.n. Ativan for agitation. All meds switched to liquids or disintegrating tablets  - Palliative care consulted     Recurrent Fall - due to parkinson  - Fall precaution  - PT/OT but patient not very willing to participate    FOLLOW-UP CARE RECOMMENDATIONS:     FOLLOW UP TESTS recommended:  Kidney and bladder ultrasound in 2 weeks with Dr. Charlene Mora to watch for: shortness of breath, fever, chills, nausea, vomiting, diarrhea, pain, confusion. DIET/what to eat:  Regular Diet    ACTIVITY:  Activity as tolerated    What to do if new or unexpected symptoms occur? If you experience any of the above symptoms (or should other concerns or questions arise after discharge) please call your primary care physician. Return to the emergency room if you cannot get hold of your doctor. · It is very important that you keep your follow-up appointment(s). · Please bring discharge papers, medication list (and/or medication bottles) to your follow-up appointments for review by your outpatient provider(s). · Please check the list of medications and be sure it includes every medication (even non-prescription medications) that your provider wants you to take.     · It is important that you take the medication exactly as they are prescribed. · Keep your medication in the bottles provided by the pharmacist and keep a list of the medication names, dosages, and times to be taken in your wallet. · Do not take other medications without consulting your doctor. · If you have any questions about your medications or other instructions, please talk to your nurse or care provider before you leave the hospital.    Physical examination at discharge  Visit Vitals    /75 (BP 1 Location: Right arm, BP Patient Position: At rest)    Pulse 68    Temp 97.7 °F (36.5 °C)    Resp 16    Ht 5' (1.524 m)    Wt 50.8 kg (112 lb)    SpO2 96%    BMI 21.87 kg/m2     Constitutional:  No acute distress. HEENT: Head is atraumatic, sclera anicteric. Oral mucous moist, oropharynx benign. Neck supple,    Resp:  CTA bilaterally. No wheezing/rhonchi/rales. No accessory muscle use   CV:  Regular rhythm, normal rate, no murmurs, gallops, rubs    GI:  Soft, non distended, non tender. normoactive bowel sounds, no hepatosplenomegaly    :  No CVA or suprapubic tenderness   Skin  :  No erythema,rash,bullae,dipigmentation     Musculoskeletal:  No edema, warm, 2+ pulses throughout    Neurologic: Awake. Smiling. Moves all extremities. Cogwheel rigidity. Pertinent imaging studies:    CXR 3/31/18  Findings: Cardiomediastinal silhouette is normal. Pulmonary vasculature is not  engorged. No focal parenchymal opacities, effusions, or pneumothorax. Bony  thorax is intact.     IMPRESSION  Impression:  1. No acute cardiopulmonary disease    CT head 3/31/18  The ventricles and cortical sulci are prominent, compatible with age related  volume loss. There is no evidence of intracranial hemorrhage, mass, mass effect,  or acute infarct. There is periventricular white matter disease. No extra-axial  fluid collections are seen. The visualized paranasal sinuses and mastoid air  cells are clear. The orbital structures are unremarkable.  No osseous  abnormalities are seen.      IMPRESSION  Impression:   1. No evidence of acute infarct or intracranial hemorrhage. 2. Mild periventricular white matter disease is likely secondary to chronic  small vessel ischemic changes. US renal 4/1/18  FINDINGS: Study is limited by patient's body habitus. Right kidney measures 9.3 cm and there is mild hydroureteronephrosis. . There is  question of a tiny nonobstructing calculus.     The left kidney measures 8.3 cm and there is mild left hydronephrosis.     The aorta tapers normally. The proximal iliac arteries is obscured by bowel gas   The IVC is obscured by bowel gas . Retroperitoneum is obscured by bowel gas     The urinary bladder mildly distended     IMPRESSION  IMPRESSION: There is mild right and left hydroureteronephrosis. . Urinary bladder  is mildly distended. No results for input(s): WBC, HGB, HCT, PLT, HGBEXT, HCTEXT, PLTEXT, HGBEXT, HCTEXT, PLTEXT in the last 72 hours. No results for input(s): NA, K, CL, CO2, BUN, CREA, GLU, CA, MG, PHOS, URICA in the last 72 hours. No results for input(s): SGOT, GPT, AP, TBIL, TP, ALB, GLOB, GGT, AML, LPSE in the last 72 hours. No lab exists for component: AMYP, HLPSE  No results for input(s): INR, PTP, APTT in the last 72 hours. No lab exists for component: INREXT, INREXT   No results for input(s): FE, TIBC, PSAT, FERR in the last 72 hours. No results for input(s): PH, PCO2, PO2 in the last 72 hours. No results for input(s): CPK, CKMB in the last 72 hours.     No lab exists for component: TROPONINI  No components found for: [place]    Chronic Diagnoses:    Problem List as of 4/5/2018  Date Reviewed: 4/1/2018          Codes Class Noted - Resolved    * (Principal)UTI (urinary tract infection) ICD-10-CM: N39.0  ICD-9-CM: 599.0  3/31/2018 - Present        Altered mental status ICD-10-CM: R41.82  ICD-9-CM: 780.97  2/22/2018 - Present        Dementia ICD-10-CM: F03.90  ICD-9-CM: 294.20  4/11/2017 - Present Osteopenia ICD-10-CM: M85.80  ICD-9-CM: 733.90  Unknown - Present        History of bladder infections ICD-10-CM: Z87.440  ICD-9-CM: V13.02  Unknown - Present        Cataract ICD-10-CM: H26.9  ICD-9-CM: 366.9  2/9/2017 - Present        RESOLVED: Acute cystitis with hematuria ICD-10-CM: N30.01  ICD-9-CM: 595.0  4/4/2017 - 4/10/2017              Time spent on discharge related activities today greater than 30 minutes.       Signed:  Tashia Yadav MD                 Hospitalist, Internal Medicine      Cc: Hilary Mauricio MD

## 2018-04-05 NOTE — DISCHARGE INSTRUCTIONS
Please bring this form with you to show your primary care provider at your follow-up appointment. Primary care provider:  Dr. Xiomara Harrington MD    Discharging provider:  Cristian Bourgeois MD    You have been admitted to the hospital with the following diagnoses:  UTI (urinary tract infection)    FOLLOW-UP CARE RECOMMENDATIONS:    APPOINTMENTS:  Follow-up Information     Follow up With Details Comments Contact Info    Xiomara Harrington MD Go on 4/9/2018 Hospital follow up PCP appointment on Monday, 4/9/18 @ 9:00 a.m.  [address]  [phone]      Joanie Hill MD Schedule an appointment as soon as possible for a visit in 2 weeks For follow up of urinary retention [address]  [phone]           Future Appointments  Date Time Provider Kyler Prabhakari   4/9/2018 9:00 AM Xiomara Harrington MD [address] UP TESTS recommended:  Kidney and bladder ultrasound in 2 weeks with Dr. Fabio Rankin to watch for: shortness of breath, fever, chills, nausea, vomiting, diarrhea, pain, confusion. DIET/what to eat:  Regular Diet    ACTIVITY:  Activity as tolerated    What to do if new or unexpected symptoms occur? If you experience any of the above symptoms (or should other concerns or questions arise after discharge) please call your primary care physician. Return to the emergency room if you cannot get hold of your doctor. · It is very important that you keep your follow-up appointment(s). · Please bring discharge papers, medication list (and/or medication bottles) to your follow-up appointments for review by your outpatient provider(s). · Please check the list of medications and be sure it includes every medication (even non-prescription medications) that your provider wants you to take. · It is important that you take the medication exactly as they are prescribed.    · Keep your medication in the bottles provided by the pharmacist and keep a list of the medication names, dosages, and times to be taken in your wallet. · Do not take other medications without consulting your doctor. · If you have any questions about your medications or other instructions, please talk to your nurse or care provider before you leave the hospital.    I understand that if any problems occur once I am at home I am to contact my physician. These instructions were explained to me and I had the opportunity to ask questions.

## 2018-04-05 NOTE — ACP (ADVANCE CARE PLANNING)
Reviewed ACP and  is legal NOK and MPOA.  Reviewed code status and family has decided on DNAR/DNI for patient

## 2018-04-05 NOTE — PALLIATIVE CARE DISCHARGE
Goals of Care/Treatment Preferences    The Palliative Medicine team was consulted as part of your/your loved one's care in the hospital. Our team is a supportive service; we strive to relieve suffering and improve quality of life. We reviewed advance care planning information, which includes the following:  Patient's Healthcare Decision Maker is[de-identified] Legal Next of Kin  Primary Decision Maker Name: Sherian Cogan  Primary Decision Maker Phone Number: [phone]  Primary Decision Maker Relationship to Patient: Spouse  Confirm Advance Directive: None (family unsure)    Patient/Health Care Proxy Stated Goals: Other (comment) (full restorative measures for now)    We reviewed / discussed your code status as: DNR     Full Code means perform CPR in the event of cardiac arrest.      DNR means do NOT perform CPR in the event of cardiac arrest.      Partial Code means you have specific preferences, please discuss with your healthcare team.      No Order means this issue was not addressed / resolved during your stay    Medical Interventions: Limited additional interventions  Other Instructions: We discussed the use of Home Based primary care and we will have the team contact patient's daughter. We also discussed options in the future to include Home Health versus Hospice care. Because of the importance of this information, we are providing you with a printed copy to share with other healthcare providers after this hospitalization is complete.

## 2018-04-05 NOTE — PROGRESS NOTES
spoke with Dr Sofía Do and patient is now discharged when her daughter comes in to the hospital early this afternoon. Patient is now being followed by the Palliative care team and per my conversation with physican they will visit patient at home. The physician also stated that the [facility] team will follow up with patient at home. I did follow up [facility] Leola Hubbard at [facility] and they are of aware of the discharge plan and will not see patient as she has a plan in place. They initially received a referral earlier in the week but no orders. Per patient's nurse Barbie Lackey daughter will  patient after lunch and take her home. Rounds completed with hospitalilst this am.  No further needs noted on my part at this time.

## 2018-04-05 NOTE — PROGRESS NOTES
is attempting follow up with pt in room 353. Pt is not able to communicate at the time.  talked with CM about pt's status. Pt's daughter will be in this afternoon for discharge. Spiritual care will continue to follow as able and as needed.      [address] Suyapa Saez M.Div, M.S, [address] available at [address] [place] [id number])

## 2018-04-06 ENCOUNTER — TELEPHONE (OUTPATIENT)
Dept: PALLATIVE CARE | Age: 73
End: 2018-04-06

## 2018-04-06 ENCOUNTER — PATIENT OUTREACH (OUTPATIENT)
Dept: INTERNAL MEDICINE CLINIC | Age: 73
End: 2018-04-06

## 2018-04-06 LAB
BACTERIA SPEC CULT: NORMAL
SERVICE CMNT-IMP: NORMAL

## 2018-04-06 NOTE — TELEPHONE ENCOUNTER
Nurse returned call to [address] [place] [address] at [facility] Internal Medicine. No answer, nurse left message that we are trying to determine what insurance pt has. No recent insurance cards are scanned into [employer]. Our system has [employer] as pt's primary insurance and family showed inpt care manager a different insurance card. This nurse called and left message with pt's dtr this afternoon to clarify insurance. Angel Saleem does not cover Home Based Primary Care visits. If pt has another insurance, our PSR will check and see if [facility] is in-network.

## 2018-04-06 NOTE — TELEPHONE ENCOUNTER
Tia with PCP office is calling to speak to Ester Monge regarding referral to [facility] program.  She  Is trying to find out if we will be seeing patient. Sarahi Hawk would call her back to discuss.

## 2018-04-06 NOTE — TELEPHONE ENCOUNTER
This nurse called pt's dtr Jessica Lopez to introduce Home Based Primary Care program and to verify pt's insurance. Once insurance is verified, our PSR can call and see if pt's insurance participates with [facility]. No answer on dtr's phone, nurse left message.

## 2018-04-06 NOTE — PROGRESS NOTES
Hospital Discharge Follow-Up      Date/Time:  2018 2:35 PM    Patient was admitted to [facility] on 3/31/18 and discharged on 18 for UTI. The physician discharge summary was available at the time of outreach. Patient was contacted within 1 business day of discharge. Inpatient RRAT score: 21    Top Challenges reviewed with the provider    UTI- Daughter concerned about risk for another UTI, \"I would like my mother tested weekly for UTI's. Inquired about number of recent UTI's. Advised patient will follow up with urologist on 18. Urologist will determine care plan. Discussed proper hygiene (front to back wiping),encouraged to take patient to the bathroom frequently and increase water intake. Caregiver verbalized understanding. Fall risk-Home Health completed 3/29/18 for PT, OT and speech. Patient ambulates with 2 assist and walker. Daughter reports no rugs or stairs to climb for patient. Bladder training every 1.5-2 hours to help with UTI's and retention. History of dementia. Transition to home based care-Insurance does not have benefit for Home based Primary Care. Caregiver notified. Caregiver in process of changing to [facility]. Will check with  to see if change will have home based benefit and benefit start date. Was this a readmission? no   Patient stated reason for the readmission: n/a    Method of communication with provider :chart routing    Nurse Navigator (NN) contacted the caregiver by telephone to perform post hospital discharge assessment. Verified name and  with caregiver as identifiers. Provided introduction to self, and explanation of the Nurse Navigator role. Reviewed discharge instructions and red flags with  caregiver who verbalized understanding. Caregiver given an opportunity to ask questions and does not have any further questions or concerns at this time. The caregiver agrees to contact the PCP office for questions related to their healthcare.  NN provided contact information for future reference. Summary of patients top problems:  1. UTI  2. Fall risk       Home Health orders at discharge: PT, OT, SN, SLP, SW, H2H, telehealth, none  [facility]: n/a  Date of initial visit: n/a    Durable Medical Equipment ordered/company: n/a  Durable Medical Equipment received: n/a    Barriers to care? Knowledge deficit for disease process. Advance Care Planning:   Does patient have an Advance Directive:  not on file-discussed DNR with palliative.  and daughter make decisions for patient. Medication:     New Medications at Discharge: yes  Changed Medications at Discharge: see d/c instructions  Discontinued Medications at Discharge: medication changed from tablets to suspensions or dissolvable tablets. Medication reconciliation was performed with caregiver, who verbalizes understanding of administration of home medications. There were no barriers to obtaining medications identified at this time. Referral to Pharm D needed: no-saw Pharm-D inpatient. Current Outpatient Prescriptions   Medication Sig    carbidopa-levodopa (PARCOPA)  mg rapid dissolve tablet Take 1 Tab by mouth three (3) times daily.  donepezil 5 mg TbDi Take 5 mg by mouth nightly.  traZODone (DESYREL) 10 mg/mL susp 10 mg/mL oral suspension (compounded) Take 5 mL by mouth nightly.  LORazepam (INTENSOL) 2 mg/mL concentrated solution Take 0.5 mL by mouth every eight (8) hours as needed. Max Daily Amount: 3 mg. For anxiety, agitation     No current facility-administered medications for this visit. There are no discontinued medications. PCP/Specialist follow up:   Future Appointments  Date Time Provider Kyler Dsouza   4/12/2018 2:30 PM Jesús Bazzi MD [facility] [address] [facility] Supportive resources in place to maintain patient in the community (ie.  Home Health, DME equipment, refer to, medication assistant plan, etc.)            4/6/18 Attend follow up with PCP and urologist. Discussed Urgent Care and Joey Solo as resource after hours. Family member is caregiver through Medicaid benefit. Inquire about home based benefits with [facility]. Discussed [address] and [employer] for support resources.  Understands red flags post discharge. 4/6/18 Monitor patient for change in mentation,behavior,fever,chills,urine color,odor or clarity. Increase water intake and continue bladder training.

## 2018-04-06 NOTE — PROGRESS NOTES
Patient was admitted to [facility] 3/31/18-4/5/18 for UTI. Discharged home. Home Health completed with [facility] on 3/29/18- confirmed with Damari Wilkinson at intake. Outgoing call made to [facility]. No answer,unable to leave message. Patient has a referral out for Home Based Primary Care vs Palliative Care. Outgoing call made to [facility] to check benefits for services. No answer, unable to leave a message. Will attempt to contact again. Transitions of care follow up scheduled with Dr. Guerline Bentley on 4/9/18.

## 2018-04-06 NOTE — TELEPHONE ENCOUNTER
Pt's daughter Catina Aguilar) is returning the NN call [facility]) # [phone]. Catina Aguilar) state's ok to leave a detailed message.

## 2018-04-09 ENCOUNTER — DOCUMENTATION ONLY (OUTPATIENT)
Dept: INTERNAL MEDICINE CLINIC | Age: 73
End: 2018-04-09

## 2018-04-09 ENCOUNTER — NURSE NAVIGATOR (OUTPATIENT)
Dept: PALLATIVE CARE | Age: 73
End: 2018-04-09

## 2018-04-09 NOTE — PROGRESS NOTES
Form faxed back to [facility] for order - hospital hold, altered mental status - fax # [phone]

## 2018-04-09 NOTE — PROGRESS NOTES
[facility] Palliative Medicine Office  Nursing Note  [phone]  Fax [phone]     Name:  Gunner Hopper  YOB: 1945     Dtr and caregiver \"Wenky\" returned call. She says effective 4/1/18, pt has [employer]. She is supposed to receive the card within a day or two and she will call to let nurse know.      DWIGHT McneillN, RN  Clinical Referral Navigator

## 2018-04-12 ENCOUNTER — OFFICE VISIT (OUTPATIENT)
Dept: INTERNAL MEDICINE CLINIC | Age: 73
End: 2018-04-12

## 2018-04-12 VITALS
HEART RATE: 85 BPM | WEIGHT: 106.8 LBS | RESPIRATION RATE: 16 BRPM | SYSTOLIC BLOOD PRESSURE: 122 MMHG | TEMPERATURE: 97.9 F | DIASTOLIC BLOOD PRESSURE: 72 MMHG | BODY MASS INDEX: 20.97 KG/M2 | OXYGEN SATURATION: 98 % | HEIGHT: 60 IN

## 2018-04-12 DIAGNOSIS — H60.8X3: ICD-10-CM

## 2018-04-12 DIAGNOSIS — R33.9 URINARY RETENTION: ICD-10-CM

## 2018-04-12 DIAGNOSIS — R41.82 ALTERED MENTAL STATUS, UNSPECIFIED ALTERED MENTAL STATUS TYPE: ICD-10-CM

## 2018-04-12 DIAGNOSIS — H61.23 HEARING LOSS OF BOTH EARS DUE TO CERUMEN IMPACTION: ICD-10-CM

## 2018-04-12 DIAGNOSIS — Z87.440 HISTORY OF URINARY TRACT INFECTION: ICD-10-CM

## 2018-04-12 DIAGNOSIS — F03.91 DEMENTIA WITH BEHAVIORAL DISTURBANCE, UNSPECIFIED DEMENTIA TYPE: Primary | ICD-10-CM

## 2018-04-12 DIAGNOSIS — R82.998 LEUKOCYTES IN URINE: ICD-10-CM

## 2018-04-12 LAB
BILIRUB UR QL STRIP: NORMAL
GLUCOSE UR-MCNC: NEGATIVE MG/DL
KETONES P FAST UR STRIP-MCNC: NORMAL MG/DL
PH UR STRIP: 5.5 [PH] (ref 4.6–8)
PROT UR QL STRIP: NORMAL
SP GR UR STRIP: 1.03 (ref 1–1.03)
UA UROBILINOGEN AMB POC: NORMAL (ref 0.2–1)
URINALYSIS CLARITY POC: NORMAL
URINALYSIS COLOR POC: YELLOW
URINE BLOOD POC: NORMAL
URINE LEUKOCYTES POC: NORMAL
URINE NITRITES POC: NEGATIVE

## 2018-04-12 RX ORDER — ACETIC ACID 20.65 MG/ML
4 SOLUTION AURICULAR (OTIC) 3 TIMES DAILY
Qty: 15 ML | Refills: 0 | Status: SHIPPED | OUTPATIENT
Start: 2018-04-12 | End: 2018-04-19

## 2018-04-12 NOTE — PROGRESS NOTES
[facility] He is a 67 y.o. female, she presents today for:    Admitted 3/31/2018  Discharged 4/5/2018  Discharge diagnosis: UTI, parkinsons, dementia, urinary retention with bilateral hydroureter  Consults: Palliative and urology  Medications changed at time of discharge as noted below. Some ongoing discussion with palliative regarding home health provided. PMH/PSH: reviewed and updated  Sochx:  reports that she has never smoked. She has never used smokeless tobacco. She reports that she does not drink alcohol or use illicit drugs. Famhx: reviewed and updated     All: No Known Allergies  Med:   Current Outpatient Prescriptions   Medication Sig    traZODone (DESYREL) 10 mg/mL susp 10 mg/mL oral suspension (compounded) Take 5 mL by mouth nightly.  LORazepam (INTENSOL) 2 mg/mL concentrated solution Take 0.5 mL by mouth every eight (8) hours as needed. Max Daily Amount: 3 mg. For anxiety, agitation    carbidopa-levodopa (PARCOPA)  mg rapid dissolve tablet Take 1 Tab by mouth three (3) times daily.  donepezil 5 mg TbDi Take 5 mg by mouth nightly. No current facility-administered medications for this visit. Review of Systems   Unable to perform ROS: Dementia     PE:  Blood pressure 122/72, pulse 85, temperature 97.9 °F (36.6 °C), temperature source Oral, resp. rate 16, height 5' (1.524 m), weight 106 lb 12.8 oz (48.4 kg), SpO2 98 %. Body mass index is 20.86 kg/(m^2). Physical Exam   Constitutional: She appears well-developed and well-nourished. No distress. HENT:   Head: Normocephalic. Cardiovascular: Normal rate. Pulmonary/Chest: Effort normal and breath sounds normal. No respiratory distress. Neurological:   Poor balance when walking. Psychiatric:   Eyes closed, leaning back. Yawning. Nursing note and vitals reviewed.     Labs:   Results for orders placed or performed in visit on 04/12/18   AMB POC URINALYSIS DIP STICK AUTO W/O MICRO   Result Value Ref Range Color (UA POC) Yellow     Clarity (UA POC) Cloudy     Glucose (UA POC) Negative Negative    Bilirubin (UA POC) 2+ Negative    Ketones (UA POC) Trace Negative    Specific gravity (UA POC) 1.030 1.001 - 1.035    Blood (UA POC) Trace Negative    pH (UA POC) 5.5 4.6 - 8.0    Protein (UA POC) Trace Negative    Urobilinogen (UA POC) 0.2 mg/dL 0.2 - 1    Nitrites (UA POC) Negative Negative    Leukocyte esterase (UA POC) Trace Negative     A/P:  67 y.o. female    ICD-10-CM ICD-9-CM    1. Dementia with behavioral disturbance, unspecified dementia type F03.91 294.21    2. Urinary retention R33.9 788.20 URINALYSIS W/ RFLX MICROSCOPIC   3. History of urinary tract infection Z87.440 V13.02 AMB POC URINALYSIS DIP STICK AUTO W/O MICRO      URINALYSIS W/ RFLX MICROSCOPIC   4. Altered mental status, unspecified altered mental status type R41.82 780.97    5. Leukocytes in urine R82.99 791.7 CULTURE, URINE   6. Excessive cerumen in both ear canals H61.23 380.4    7. Chemical otitis externa of both ears, unspecified chronicity H60.8X3 380.22 acetic acid (VOSOL) 2 % otic solution     Dementia: recently changed from tablet to liquid medications. Daughter notes that it is hard to get her to take any medication. Trying to given trazodone and donepezil at the least. Notes that neurologist was not strongly in favor of having to use carbidopa-levodopa. Poor oral intake: worsened in last 2 days per daughter. Just left hospital for UTI. Repeat urinalysis requested today. poc without strong findings ,requested culture and urinalysis to be sent to lab, however after visit revealed only culture sent.    - Okay to trial giving donepezil in Am.    - advised to continue to offer liquids, trial straw (patient refusing to eat because needs dentures. .. discussed that using a straw may help her with intake.      Excessive cerumen in ears with impaction: patient indicates pain in hears with hands, was able to use cerumen spoons to completely remove large amount of red/orange wax bilaterally. With assistance of daughter and  she tolerated this well. - follow-up care with acetic acid in case of small abraision    Also made call to  to verify [facility] codes and sent on to palliative care coordinator. Appreciate assistance of Marie Cunha who also met with patient. - She was given AVS and expressed understanding with the diagnosis and plan as discussed. Follow-up Disposition:  Return in about 2 weeks (around 4/26/2018) for follow-up dementia and poor oral intake. No future appointments.       60 minutes time spent with >50% in counseling and/or coordination of care

## 2018-04-12 NOTE — MR AVS SNAPSHOT
[address] [facility] [address] [place] [address] 
[phone] Patient: Brittney Hopper MRN: [id number] AFY:[id number] Visit Information Date & Time Provider Department Dept. Phone Encounter #  
 4/12/2018  2:30 PM Shannon King MD [id number] [id number] Follow-up Instructions Return in about 2 weeks (around 4/26/2018) for follow-up dementia and poor oral intake. Upcoming Health Maintenance Date Due DTaP/Tdap/Td series (1 - Tdap) 5/25/1965 FOBT Q 1 YEAR AGE 50-75 5/25/1994 ZOSTER VACCINE AGE 60> 3/25/2004 GLAUCOMA SCREENING Q2Y 5/25/2009 MEDICARE YEARLY EXAM 4/11/2018 Pneumococcal 65+ Low/Medium Risk (2 of 2 - PPSV23) 7/12/2018 BREAST CANCER SCRN MAMMOGRAM 7/20/2019 Allergies as of 4/12/2018  Review Complete On: 4/12/2018 By: Alberto Cotto No Known Allergies Current Immunizations  Reviewed on 7/12/2017 Name Date Influenza Vaccine (Quad) PF 2/25/2018  1:49 PM  
 Pneumococcal Conjugate (PCV-13) 7/12/2017  4:56 PM  
  
 Not reviewed this visit You Were Diagnosed With   
  
 Codes Comments Dementia due to Parkinson's disease with behavioral disturbance ([facility] [address]    -  Primary ICD-10-CM: G20, F02.81 ICD-9-CM: 332.0, 294.11 Urinary retention     ICD-10-CM: R33.9 ICD-9-CM: 788.20 History of urinary tract infection     ICD-10-CM: Z87.440 ICD-9-CM: V13.02 Altered mental status, unspecified altered mental status type     ICD-10-CM: R41.82 
ICD-9-CM: 780.97 Leukocytes in urine     ICD-10-CM: R82.99 
ICD-9-CM: 791.7 Excessive cerumen in both ear canals     ICD-10-CM: H61.23 
ICD-9-CM: 380. 4 Chemical otitis externa of both ears, unspecified chronicity     ICD-10-CM: H60.8X3 ICD-9-CM: 380.22 Vitals BP Pulse Temp Resp Height(growth percentile) Weight(growth percentile) 122/72 (BP 1 Location: Left arm, BP Patient Position: Sitting) 85 97.9 °F (36.6 °C) (Oral) 16 5' (1.524 m) 106 lb 12.8 oz (48.4 kg) SpO2 BMI OB Status Smoking Status 98% 20.86 kg/m2 Postmenopausal Never Smoker BMI and BSA Data Body Mass Index Body Surface Area  
 20.86 kg/m 2 1.43 m 2 Preferred Pharmacy Pharmacy Name Phone [id number] [address], [address] [place] [address] [phone] Your Updated Medication List  
  
   
This list is accurate as of 4/12/18  4:26 PM.  Always use your most recent med list.  
  
  
  
  
 acetic acid 2 % otic solution Commonly known as:  [place] Yanira Administer 4 Drops into each ear three (3) times daily for 7 days. carbidopa-levodopa  mg rapid dissolve tablet Commonly known as:  PARCOPA Take 1 Tab by mouth three (3) times daily. donepezil 5 mg Tbdi Take 5 mg by mouth nightly. LORazepam 2 mg/mL concentrated solution Commonly known as:  INTENSOL Take 0.5 mL by mouth every eight (8) hours as needed. Max Daily Amount: 3 mg. For anxiety, agitation  
  
 traZODone 10 mg/mL Susp 10 mg/mL oral suspension (compounded) Commonly known as:  Katelynn Homme Take 5 mL by mouth nightly. Prescriptions Sent to Pharmacy Refills  
 acetic acid (VOSOL) 2 % otic solution 0 Sig: Administer 4 Drops into each ear three (3) times daily for 7 days. Class: Normal  
 Pharmacy: [facility]/pharmacy [address], [address] [place] [address] Ph #: [phone] Route: Both Ears We Performed the Following AMB POC URINALYSIS DIP STICK AUTO W/O MICRO [11294 CPT(R)] CULTURE, URINE [id number] CPT(R)] URINALYSIS W/ RFLX MICROSCOPIC [78295 CPT(R)] Follow-up Instructions Return in about 2 weeks (around 4/26/2018) for follow-up dementia and poor oral intake. Patient Instructions Palliative [phone] Introducing \[facility]\"" & HEALTH SERVICES! Deo Vinson introduces [employer] patient portal. Now you can access parts of your medical record, email your doctor's office, and request medication refills online. 1. In your internet browser, go to [email or web address] [email or web address]t 2. Click on the First Time User? Click Here link in the Sign In box. You will see the New Member Sign Up page. 3. Enter your [employer] Access Code exactly as it appears below. You will not need to use this code after youve completed the sign-up process. If you do not sign up before the expiration date, you must request a new code. · [employer] Access Code: [facility] Expires: 5/26/2018  1:26 PM 
 
4. Enter the last four digits of your Social Security Number (xxxx) and Date of Birth (mm/dd/yyyy) as indicated and click Submit. You will be taken to the next sign-up page. 5. Create a [employer] ID. This will be your [employer] login ID and cannot be changed, so think of one that is secure and easy to remember. 6. Create a [employer] password. You can change your password at any time. 7. Enter your Password Reset Question and Answer. This can be used at a later time if you forget your password. 8. Enter your e-mail address. You will receive e-mail notification when new information is available in [address] 9. Click Sign Up. You can now view and download portions of your medical record. 10. Click the Download Summary menu link to download a portable copy of your medical information. If you have questions, please visit the Frequently Asked Questions section of the [employer] website. Remember, [employer] is NOT to be used for urgent needs. For medical emergencies, dial 911. Now available from your iPhone and Android! Please provide this summary of care documentation to your next provider. Your primary care clinician is listed as Gilford Lewandowsky. If you have any questions after today's visit, please call [phone].

## 2018-04-12 NOTE — PROGRESS NOTES
RM#1  Chief Complaint   Patient presents with    Transitions Of Care     daughter states possible UIT     Results for orders placed or performed in visit on 04/12/18   AMB POC URINALYSIS DIP STICK AUTO W/O MICRO   Result Value Ref Range    Color (UA POC) Yellow     Clarity (UA POC) Cloudy     Glucose (UA POC) Negative Negative    Bilirubin (UA POC) 2+ Negative    Ketones (UA POC) Trace Negative    Specific gravity (UA POC) 1.030 1.001 - 1.035    Blood (UA POC) Trace Negative    pH (UA POC) 5.5 4.6 - 8.0    Protein (UA POC) Trace Negative    Urobilinogen (UA POC) 0.2 mg/dL 0.2 - 1    Nitrites (UA POC) Negative Negative    Leukocyte esterase (UA POC) Trace Negative       1. Have you been to the ER, urgent care clinic since your last visit? Hospitalized since your last visit? No    2. Have you seen or consulted any other health care providers outside of the [address] [place] [address] since your last visit? Include any pap smears or colon screening.  No  Health Maintenance Due   Topic Date Due    DTaP/Tdap/Td series (1 - Tdap) 05/25/1965    FOBT Q 1 YEAR AGE 50-75  05/25/1994    ZOSTER VACCINE AGE 60>  03/25/2004    GLAUCOMA SCREENING Q2Y  05/25/2009    MEDICARE YEARLY EXAM  04/11/2018

## 2018-04-13 ENCOUNTER — TELEPHONE (OUTPATIENT)
Dept: INTERNAL MEDICINE CLINIC | Age: 73
End: 2018-04-13

## 2018-04-13 ENCOUNTER — NURSE NAVIGATOR (OUTPATIENT)
Dept: PALLATIVE CARE | Age: 73
End: 2018-04-13

## 2018-04-13 ENCOUNTER — PATIENT OUTREACH (OUTPATIENT)
Dept: INTERNAL MEDICINE CLINIC | Age: 73
End: 2018-04-13

## 2018-04-13 LAB
APPEARANCE UR: CLEAR
BACTERIA #/AREA URNS HPF: ABNORMAL /[HPF]
BILIRUB UR QL STRIP: NEGATIVE
CASTS URNS QL MICRO: ABNORMAL /LPF
COLOR UR: YELLOW
CRYSTALS URNS MICRO: ABNORMAL
EPI CELLS #/AREA URNS HPF: ABNORMAL /HPF
GLUCOSE UR QL: NEGATIVE
HGB UR QL STRIP: ABNORMAL
KETONES UR QL STRIP: ABNORMAL
LEUKOCYTE ESTERASE UR QL STRIP: ABNORMAL
MICRO URNS: ABNORMAL
MUCOUS THREADS URNS QL MICRO: PRESENT
NITRITE UR QL STRIP: NEGATIVE
PH UR STRIP: 5.5 [PH] (ref 5–7.5)
PROT UR QL STRIP: ABNORMAL
RBC #/AREA URNS HPF: ABNORMAL /HPF
SP GR UR: 1.03 (ref 1–1.03)
UNIDENT CRYS URNS QL MICRO: PRESENT
UROBILINOGEN UR STRIP-MCNC: 0.2 MG/DL (ref 0.2–1)
WBC #/AREA URNS HPF: ABNORMAL /HPF

## 2018-04-13 NOTE — PROGRESS NOTES
Outgoing call made to Quinn Desai for Home based Primary Care. Introduced self and verified patient's name and . Advised patient does not qualify for Home Based Primary Care or Palliative at this time. [facility] does not cover Home Based Primary Care and patient does not meet criteria for palliative care at this time. Will notify family. Outgoing call made to daughter \"Wenky. \" Left message to return call. Calling to give information about [facility] coverage and give resources to family.

## 2018-04-13 NOTE — PROGRESS NOTES
Home Based Primary Care & Supportive Services   (previously: At Home)  [phone] [facility], [address] [place] [address], [facility] [employer]EMA Javed at [facility] called to follow up on Home Based Primary Care referral for Tracy Chenglucius Hopper. Ms. Elvis Nieves says pt was seen at their office by Dr. Shelton Delvalle yesterday. Dtr would prefer that pt be seen at home. Ms. Elvis Nieves verified taht pt now has [employer]. If pt is able to get in to PCP office, she would not meet homebound criteria for [facility] and also, [facility] is not in-network with [facility] Medicare Replacement insurance. Ms. Elvis Nieves says pt ambulates, but she did become agitated in the PCP office yesterday. This nurse explained that if pt's condition declines, pt becomes homebound, and pt meets criteria for Palliative Medicine:  A life-limiting illness with life expectancy of less than 2 years and in addition to the limited life expectancy, the presence of 1 or more COPE needs (Care Decisions, Overwhelming Symptoms, Psychosocial Distress, and End-Stage Disease),  Palliative Home services does participate with [facility] and could see pt.     Tal Chin RN                                                                                                                                                                                                   Referral Navigator, Home Based Primary Care & Supportive Services

## 2018-04-13 NOTE — TELEPHONE ENCOUNTER
Spoke with pt daughter Amna June, after verifying pt , daughter is on HIPPA. Informed Amna June that not enough urine to send culture out. Daughter stated pt had started drinking and eating better last night but was not doing so well today. Daughter stated she did not have pt new ins and asked if I could give her pt ins. Information, information given. Daughter will be taking her mom to be seen.

## 2018-04-13 NOTE — TELEPHONE ENCOUNTER
Please advise family:    - Urinalysis was sent and may be sign of infection. .. Unclear. - Unfortuantely urine culture could not be sent yesterday as not enough urine. Please inquire how Elaine Stahl is today. .. Is she drinking more? Clear signs of dehydration on urine tests. If drinking more, could they come in to leave urine sample for urine culture? If not drinking more, would suggest going to ER for further evaluation.      Thanks  Esperanza Gilliland MD

## 2018-04-20 ENCOUNTER — DOCUMENTATION ONLY (OUTPATIENT)
Dept: INTERNAL MEDICINE CLINIC | Age: 73
End: 2018-04-20

## 2018-04-20 NOTE — PROGRESS NOTES
[employer] Disabled Parking Placard or License Plates Application/Blank Form/received on 4/20/18 scanned into CC and placed in provider's Box.

## 2018-04-24 ENCOUNTER — HOSPITAL ENCOUNTER (EMERGENCY)
Age: 73
Discharge: HOME | End: 2018-04-24
Attending: EMERGENCY MEDICINE
Payer: MEDICARE

## 2018-04-24 ENCOUNTER — APPOINTMENT (OUTPATIENT)
Dept: CT IMAGING | Age: 73
End: 2018-04-24
Attending: PHYSICIAN ASSISTANT
Payer: MEDICARE

## 2018-04-24 VITALS
OXYGEN SATURATION: 98 % | RESPIRATION RATE: 20 BRPM | TEMPERATURE: 98.2 F | BODY MASS INDEX: 23.62 KG/M2 | SYSTOLIC BLOOD PRESSURE: 141 MMHG | HEART RATE: 75 BPM | DIASTOLIC BLOOD PRESSURE: 73 MMHG | HEIGHT: 56 IN | WEIGHT: 105 LBS

## 2018-04-24 DIAGNOSIS — S09.90XA CLOSED HEAD INJURY, INITIAL ENCOUNTER: Primary | ICD-10-CM

## 2018-04-24 LAB
ALBUMIN SERPL-MCNC: 3.5 G/DL (ref 3.5–5)
ALBUMIN/GLOB SERPL: 0.9 {RATIO} (ref 1.1–2.2)
ALP SERPL-CCNC: 58 U/L (ref 45–117)
ALT SERPL-CCNC: 33 U/L (ref 12–78)
ANION GAP SERPL CALC-SCNC: 9 MMOL/L (ref 5–15)
APPEARANCE UR: CLEAR
AST SERPL-CCNC: 25 U/L (ref 15–37)
BACTERIA URNS QL MICRO: NEGATIVE /HPF
BASOPHILS # BLD: 0 K/UL (ref 0–0.1)
BASOPHILS NFR BLD: 0 % (ref 0–1)
BILIRUB SERPL-MCNC: 1.8 MG/DL (ref 0.2–1)
BILIRUB UR QL: NEGATIVE
BUN SERPL-MCNC: 9 MG/DL (ref 6–20)
BUN/CREAT SERPL: 11 (ref 12–20)
CALCIUM SERPL-MCNC: 9.2 MG/DL (ref 8.5–10.1)
CHLORIDE SERPL-SCNC: 108 MMOL/L (ref 97–108)
CO2 SERPL-SCNC: 25 MMOL/L (ref 21–32)
COLOR UR: ABNORMAL
CREAT SERPL-MCNC: 0.79 MG/DL (ref 0.55–1.02)
DIFFERENTIAL METHOD BLD: ABNORMAL
EOSINOPHIL # BLD: 0.1 K/UL (ref 0–0.4)
EOSINOPHIL NFR BLD: 1 % (ref 0–7)
EPITH CASTS URNS QL MICRO: ABNORMAL /LPF
ERYTHROCYTE [DISTWIDTH] IN BLOOD BY AUTOMATED COUNT: 12.6 % (ref 11.5–14.5)
GLOBULIN SER CALC-MCNC: 3.9 G/DL (ref 2–4)
GLUCOSE SERPL-MCNC: 95 MG/DL (ref 65–100)
GLUCOSE UR STRIP.AUTO-MCNC: NEGATIVE MG/DL
HCT VFR BLD AUTO: 38.1 % (ref 35–47)
HGB BLD-MCNC: 12.9 G/DL (ref 11.5–16)
HGB UR QL STRIP: NEGATIVE
IMM GRANULOCYTES # BLD: 0 K/UL (ref 0–0.04)
IMM GRANULOCYTES NFR BLD AUTO: 0 % (ref 0–0.5)
KETONES UR QL STRIP.AUTO: ABNORMAL MG/DL
LEUKOCYTE ESTERASE UR QL STRIP.AUTO: NEGATIVE
LYMPHOCYTES # BLD: 1.1 K/UL (ref 0.8–3.5)
LYMPHOCYTES NFR BLD: 14 % (ref 12–49)
MCH RBC QN AUTO: 31.5 PG (ref 26–34)
MCHC RBC AUTO-ENTMCNC: 33.9 G/DL (ref 30–36.5)
MCV RBC AUTO: 92.9 FL (ref 80–99)
MONOCYTES # BLD: 0.5 K/UL (ref 0–1)
MONOCYTES NFR BLD: 6 % (ref 5–13)
NEUTS SEG # BLD: 5.9 K/UL (ref 1.8–8)
NEUTS SEG NFR BLD: 78 % (ref 32–75)
NITRITE UR QL STRIP.AUTO: NEGATIVE
NRBC # BLD: 0 K/UL (ref 0–0.01)
NRBC BLD-RTO: 0 PER 100 WBC
PH UR STRIP: 6 [PH] (ref 5–8)
PLATELET # BLD AUTO: 185 K/UL (ref 150–400)
PMV BLD AUTO: 10.2 FL (ref 8.9–12.9)
POTASSIUM SERPL-SCNC: 3.5 MMOL/L (ref 3.5–5.1)
PROT SERPL-MCNC: 7.4 G/DL (ref 6.4–8.2)
PROT UR STRIP-MCNC: NEGATIVE MG/DL
RBC # BLD AUTO: 4.1 M/UL (ref 3.8–5.2)
RBC #/AREA URNS HPF: ABNORMAL /HPF (ref 0–5)
SODIUM SERPL-SCNC: 142 MMOL/L (ref 136–145)
SP GR UR REFRACTOMETRY: 1.02 (ref 1–1.03)
TROPONIN I SERPL-MCNC: <0.04 NG/ML
UROBILINOGEN UR QL STRIP.AUTO: 0.2 EU/DL (ref 0.2–1)
WBC # BLD AUTO: 7.6 K/UL (ref 3.6–11)
WBC URNS QL MICRO: ABNORMAL /HPF (ref 0–4)

## 2018-04-24 PROCEDURE — 70450 CT HEAD/BRAIN W/O DYE: CPT

## 2018-04-24 PROCEDURE — 93005 ELECTROCARDIOGRAM TRACING: CPT

## 2018-04-24 PROCEDURE — 84484 ASSAY OF TROPONIN QUANT: CPT | Performed by: PHYSICIAN ASSISTANT

## 2018-04-24 PROCEDURE — 72125 CT NECK SPINE W/O DYE: CPT

## 2018-04-24 PROCEDURE — 85025 COMPLETE CBC W/AUTO DIFF WBC: CPT | Performed by: PHYSICIAN ASSISTANT

## 2018-04-24 PROCEDURE — 99284 EMERGENCY DEPT VISIT MOD MDM: CPT

## 2018-04-24 PROCEDURE — 36415 COLL VENOUS BLD VENIPUNCTURE: CPT | Performed by: PHYSICIAN ASSISTANT

## 2018-04-24 PROCEDURE — 81001 URINALYSIS AUTO W/SCOPE: CPT | Performed by: PHYSICIAN ASSISTANT

## 2018-04-24 PROCEDURE — 80053 COMPREHEN METABOLIC PANEL: CPT | Performed by: PHYSICIAN ASSISTANT

## 2018-04-24 NOTE — ED TRIAGE NOTES
Daughter reports pt fell just prior to arrival hitting head on the ground. Daughter state she tried to catch her but she was unable to make it chavez her in time. Denies LOC. Pt has history of dementia.
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
## 2018-04-25 LAB
ATRIAL RATE: 82 BPM
CALCULATED P AXIS, ECG09: -4 DEGREES
CALCULATED R AXIS, ECG10: 36 DEGREES
CALCULATED T AXIS, ECG11: 46 DEGREES
DIAGNOSIS, 93000: NORMAL
P-R INTERVAL, ECG05: 180 MS
Q-T INTERVAL, ECG07: 382 MS
QRS DURATION, ECG06: 74 MS
QTC CALCULATION (BEZET), ECG08: 446 MS
VENTRICULAR RATE, ECG03: 82 BPM

## 2018-04-25 NOTE — ED NOTES
Pt resting in bed, on monitor, family at bedside, pt in&out cathed for us sample-sent to lab. Pt smiling, but will not answer orientation questions-family states she will sometimes \"get in moods due to her dementia\". ALEX HERNANDEZ, WCTM.

## 2018-04-25 NOTE — ED NOTES
MD reviewed discharge instructions with the patient. The patient verbalized understanding. Patient ambulated out of ED via w/c with  and daughter. No complaints or concerns noted.

## 2018-04-25 NOTE — DISCHARGE INSTRUCTIONS
???????? - [ Learning About a Closed Head Injury ]  ??????????    ????????????????? ?????????????????? ?????????????????? ???????????? ?????????????  ????????????????  ??????  ????????????????????\"???\"? ???????? ????? 4 ????????  ????????????????????????? ?????? ??????????? ???  · ???????????????????????  · ???  · ????????????????????  · ???????  · ??????????  · ????????  · ????????  · ???????????? ??????????  ???????????  ????????????? ???????????? ?????????? ?????????? ????????????????? 24 ?????????????  ???????? ??????? ???????  · ????????????????? ????????????? ?????????????????? ????????????????????  · ?????????????????????  · ?????????????? (Tylenol)? ??? (Advil? Motrin) ? ????? (Aleve)? ???????? ?????????????????  · ??????????????  · ???????????? ?????????? ????????????????  ?????????????????? ?????????????????????????????????????? ??????????????????????????  ??????????  ?? [email or web address] ?? [email or web address] ?????????????????? \"???????? - [ Learning About a Closed Head Injury ]? \"  ??????: 5035?85?69?  ????: 11.4  © 2344-0590 [employer]wise, [employer]. ??????????????? ????????? ??????????????????????????????? [employer] ???????????????????? We hope that we have addressed all of your medical concerns. The examination and treatment you received in the Emergency Department were for an emergent problem and were not intended as complete care. It is important that you follow up with your healthcare provider(s) for ongoing care. If your symptoms worsen or do not improve as expected, and you are unable to reach your usual health care provider(s), you should return to the Emergency Department. Today's healthcare is undergoing tremendous change, and patient satisfaction surveys are one of the many tools to assess the quality of medical care. You may receive a survey from the [employer] organization regarding your experience in the Emergency Department.   I hope that your experience has been completely positive, particularly the medical care that I provided. As such, please participate in the survey; anything less than excellent does not meet my expectations or intentions. [facility] and [facility] participate in nationally recognized quality of care measures. If your blood pressure is greater than 120/80, as reported below, we urge that you seek medical care to address the potential of high blood pressure, commonly known as hypertension. Hypertension can be hereditary or can be caused by certain medical conditions, pain, stress, or \"white coat syndrome. \"       Please make an appointment with your health care provider(s) for follow up of your Emergency Department visit. VITALS:   Patient Vitals for the past 8 hrs:   Temp Pulse Resp BP SpO2   04/24/18 2004 - 73 16 136/68 96 %   04/24/18 1829 98.3 °F (36.8 °C) 79 16 142/82 98 %          Thank you for allowing us to provide you with medical care today. We realize that you have many choices for your emergency care needs. Please choose us in the future for any continued health care needs. Kelsea Patell, [address] [place] [address] 20.   Office: [phone]            Recent Results (from the past 24 hour(s))   EKG, 12 LEAD, INITIAL    Collection Time: 04/24/18  6:44 PM   Result Value Ref Range    Ventricular Rate 82 BPM    Atrial Rate 82 BPM    P-R Interval 180 ms    QRS Duration 74 ms    Q-T Interval 382 ms    QTC Calculation (Bezet) 446 ms    Calculated P Axis -4 degrees    Calculated R Axis 36 degrees    Calculated T Axis 46 degrees    Diagnosis       Normal sinus rhythm  When compared with ECG of 31-MAR-2018 14:58,  No significant change was found     CBC WITH AUTOMATED DIFF    Collection Time: 04/24/18  7:28 PM   Result Value Ref Range    WBC 7.6 3.6 - 11.0 K/uL    RBC 4.10 3.80 - 5.20 M/uL    HGB 12.9 11.5 - 16.0 g/dL    HCT 38.1 35.0 - 47.0 %    MCV 92.9 80.0 - 99.0 FL    MCH 31.5 26.0 - 34.0 PG    MCHC 33.9 30.0 - 36.5 g/dL    RDW 12.6 11.5 - 14.5 %    PLATELET 525 266 - 178 K/uL    MPV 10.2 8.9 - 12.9 FL    NRBC 0.0 0  WBC    ABSOLUTE NRBC 0.00 0.00 - 0.01 K/uL    NEUTROPHILS 78 (H) 32 - 75 %    LYMPHOCYTES 14 12 - 49 %    MONOCYTES 6 5 - 13 %    EOSINOPHILS 1 0 - 7 %    BASOPHILS 0 0 - 1 %    IMMATURE GRANULOCYTES 0 0.0 - 0.5 %    ABS. NEUTROPHILS 5.9 1.8 - 8.0 K/UL    ABS. LYMPHOCYTES 1.1 0.8 - 3.5 K/UL    ABS. MONOCYTES 0.5 0.0 - 1.0 K/UL    ABS. EOSINOPHILS 0.1 0.0 - 0.4 K/UL    ABS. BASOPHILS 0.0 0.0 - 0.1 K/UL    ABS. IMM. GRANS. 0.0 0.00 - 0.04 K/UL    DF AUTOMATED     METABOLIC PANEL, COMPREHENSIVE    Collection Time: 04/24/18  7:28 PM   Result Value Ref Range    Sodium 142 136 - 145 mmol/L    Potassium 3.5 3.5 - 5.1 mmol/L    Chloride 108 97 - 108 mmol/L    CO2 25 21 - 32 mmol/L    Anion gap 9 5 - 15 mmol/L    Glucose 95 65 - 100 mg/dL    BUN 9 6 - 20 MG/DL    Creatinine 0.79 0.55 - 1.02 MG/DL    BUN/Creatinine ratio 11 (L) 12 - 20      GFR est AA >60 >60 ml/min/1.73m2    GFR est non-AA >60 >60 ml/min/1.73m2    Calcium 9.2 8.5 - 10.1 MG/DL    Bilirubin, total 1.8 (H) 0.2 - 1.0 MG/DL    ALT (SGPT) 33 12 - 78 U/L    AST (SGOT) 25 15 - 37 U/L    Alk.  phosphatase 58 45 - 117 U/L    Protein, total 7.4 6.4 - 8.2 g/dL    Albumin 3.5 3.5 - 5.0 g/dL    Globulin 3.9 2.0 - 4.0 g/dL    A-G Ratio 0.9 (L) 1.1 - 2.2     TROPONIN I    Collection Time: 04/24/18  7:28 PM   Result Value Ref Range    Troponin-I, Qt. <0.04 <0.05 ng/mL   URINALYSIS W/MICROSCOPIC    Collection Time: 04/24/18  8:04 PM   Result Value Ref Range    Color YELLOW/STRAW      Appearance CLEAR CLEAR      Specific gravity 1.017 1.003 - 1.030      pH (UA) 6.0 5.0 - 8.0      Protein NEGATIVE  NEG mg/dL    Glucose NEGATIVE  NEG mg/dL    Ketone TRACE (A) NEG mg/dL    Bilirubin NEGATIVE  NEG      Blood NEGATIVE  NEG      Urobilinogen 0.2 0.2 - 1.0 EU/dL    Nitrites NEGATIVE  NEG Leukocyte Esterase NEGATIVE  NEG      WBC PENDING /hpf    RBC PENDING /hpf    Epithelial cells PENDING /lpf    Bacteria PENDING /hpf       Ct Head Wo Cont    Result Date: 4/24/2018  INDICATION: unwitnessed fall Exam: Noncontrast CT of the brain is performed with 5 mm collimation. CT dose reduction was achieved with the use of the standardized protocol tailored for this examination and automatic exposure control for dose modulation. Adaptive statistical iterative reconstruction (ASIR) was utilized. Direct comparison is made to prior CT dated March 2018. FINDINGS: There is no acute intracranial hemorrhage, mass, mass effect or herniation. Ventricular system is normal. The gray-white matter differentiation is well-preserved. The mastoid air cells are well pneumatized. The visualized paranasal sinuses are normal.     IMPRESSION: No acute intracranial hemorrhage, mass or infarct. Ct Spine Cerv Wo Cont    Result Date: 4/24/2018  EXAM:  CT CERVICAL SPINE WITHOUT CONTRAST INDICATION:   Lenette Glendy and hit head on the ground. COMPARISON: 2/22/2018. TECHNIQUE: Multislice helical CT of the cervical spine was performed without intravenous contrast administration. Sagittal and coronal reconstructions were generated. CT dose reduction was achieved through use of a standardized protocol tailored for this examination and automatic exposure control for dose modulation. Adaptive statistical iterative reconstruction (ASIR) was utilized. FINDINGS: Straightening of normal cervical lordosis may be positional. No fracture or dislocation. The craniocervical junction is normal. A sub-6 mm, subpleural nodule in the right upper lobe requires no follow-up. C2-C3:  No herniation or stenosis. Minimal disc bulge. C3-C4:  A disc osteophyte complex causes mild canal stenosis. C4-C5:  Disc osteophyte complex causes mild canal stenosis. Uncovertebral and facet osteoarthritis cause mild right neural foraminal stenosis.  C5-C6:  Uncovertebral and facet osteoarthritis cause right and mild left neural foraminal stenosis. A disc osteophyte complex causes mild to moderate canal stenosis. C6-C7:  A disc osteophyte complex causes mild to moderate canal stenosis. Uncovertebral osteoarthritis causes left neural foraminal stenosis. C7-T1:  No herniation or stenosis. IMPRESSION: No fracture. Mild degenerative disease as described above, worst at C5-C6.

## 2018-05-01 DIAGNOSIS — G20 DEMENTIA DUE TO PARKINSON'S DISEASE WITH BEHAVIORAL DISTURBANCE (HCC): ICD-10-CM

## 2018-05-01 DIAGNOSIS — F02.818 DEMENTIA DUE TO PARKINSON'S DISEASE WITH BEHAVIORAL DISTURBANCE (HCC): ICD-10-CM

## 2018-05-01 NOTE — TELEPHONE ENCOUNTER
Please return her call. It may be that this was meant to go to neurologist?.   I am not aware of form. But it may be in an inbasket. It paco be the DMV form entered by Josi on 4/20. Though oddly I don't recall this form. ..   Allie Arreaga MD

## 2018-05-02 RX ORDER — LORAZEPAM 2 MG/ML
1 CONCENTRATE ORAL
Qty: 30 ML | Refills: 0 | OUTPATIENT
Start: 2018-05-02

## 2018-05-04 ENCOUNTER — TELEPHONE (OUTPATIENT)
Dept: INTERNAL MEDICINE CLINIC | Age: 73
End: 2018-05-04

## 2018-05-04 ENCOUNTER — DOCUMENTATION ONLY (OUTPATIENT)
Dept: INTERNAL MEDICINE CLINIC | Age: 73
End: 2018-05-04

## 2018-05-04 NOTE — TELEPHONE ENCOUNTER
Spoke with pt daughter, after verifying pt .  Let daughter know DMV form has been faxed and original copy up front for

## 2018-05-10 ENCOUNTER — APPOINTMENT (OUTPATIENT)
Dept: GENERAL RADIOLOGY | Age: 73
End: 2018-05-10
Attending: EMERGENCY MEDICINE
Payer: MEDICARE

## 2018-05-10 ENCOUNTER — HOSPITAL ENCOUNTER (EMERGENCY)
Age: 73
Discharge: HOME | End: 2018-05-10
Attending: EMERGENCY MEDICINE
Payer: MEDICARE

## 2018-05-10 VITALS
HEART RATE: 80 BPM | HEIGHT: 56 IN | OXYGEN SATURATION: 99 % | SYSTOLIC BLOOD PRESSURE: 140 MMHG | RESPIRATION RATE: 16 BRPM | TEMPERATURE: 98.2 F | WEIGHT: 106 LBS | BODY MASS INDEX: 23.84 KG/M2 | DIASTOLIC BLOOD PRESSURE: 86 MMHG

## 2018-05-10 DIAGNOSIS — R60.9 EDEMA, UNSPECIFIED TYPE: Primary | ICD-10-CM

## 2018-05-10 LAB
ALBUMIN SERPL-MCNC: 3.6 G/DL (ref 3.5–5)
ALBUMIN/GLOB SERPL: 1 {RATIO} (ref 1.1–2.2)
ALP SERPL-CCNC: 50 U/L (ref 45–117)
ALT SERPL-CCNC: 23 U/L (ref 12–78)
ANION GAP SERPL CALC-SCNC: 8 MMOL/L (ref 5–15)
AST SERPL-CCNC: 24 U/L (ref 15–37)
BASOPHILS # BLD: 0 K/UL (ref 0–0.1)
BASOPHILS NFR BLD: 1 % (ref 0–1)
BILIRUB SERPL-MCNC: 1.7 MG/DL (ref 0.2–1)
BNP SERPL-MCNC: 266 PG/ML (ref 0–125)
BUN SERPL-MCNC: 13 MG/DL (ref 6–20)
BUN/CREAT SERPL: 16 (ref 12–20)
CALCIUM SERPL-MCNC: 9.3 MG/DL (ref 8.5–10.1)
CHLORIDE SERPL-SCNC: 107 MMOL/L (ref 97–108)
CO2 SERPL-SCNC: 26 MMOL/L (ref 21–32)
CREAT SERPL-MCNC: 0.82 MG/DL (ref 0.55–1.02)
DIFFERENTIAL METHOD BLD: NORMAL
EOSINOPHIL # BLD: 0.1 K/UL (ref 0–0.4)
EOSINOPHIL NFR BLD: 2 % (ref 0–7)
ERYTHROCYTE [DISTWIDTH] IN BLOOD BY AUTOMATED COUNT: 13 % (ref 11.5–14.5)
GLOBULIN SER CALC-MCNC: 3.5 G/DL (ref 2–4)
GLUCOSE SERPL-MCNC: 122 MG/DL (ref 65–100)
HCT VFR BLD AUTO: 37 % (ref 35–47)
HGB BLD-MCNC: 12.1 G/DL (ref 11.5–16)
IMM GRANULOCYTES # BLD: 0 K/UL (ref 0–0.04)
IMM GRANULOCYTES NFR BLD AUTO: 0 % (ref 0–0.5)
LYMPHOCYTES # BLD: 1.3 K/UL (ref 0.8–3.5)
LYMPHOCYTES NFR BLD: 24 % (ref 12–49)
MCH RBC QN AUTO: 31.4 PG (ref 26–34)
MCHC RBC AUTO-ENTMCNC: 32.7 G/DL (ref 30–36.5)
MCV RBC AUTO: 96.1 FL (ref 80–99)
MONOCYTES # BLD: 0.5 K/UL (ref 0–1)
MONOCYTES NFR BLD: 9 % (ref 5–13)
NEUTS SEG # BLD: 3.6 K/UL (ref 1.8–8)
NEUTS SEG NFR BLD: 65 % (ref 32–75)
NRBC # BLD: 0 K/UL (ref 0–0.01)
NRBC BLD-RTO: 0 PER 100 WBC
PLATELET # BLD AUTO: 194 K/UL (ref 150–400)
PMV BLD AUTO: 10.3 FL (ref 8.9–12.9)
POTASSIUM SERPL-SCNC: 3.5 MMOL/L (ref 3.5–5.1)
PROT SERPL-MCNC: 7.1 G/DL (ref 6.4–8.2)
RBC # BLD AUTO: 3.85 M/UL (ref 3.8–5.2)
SODIUM SERPL-SCNC: 141 MMOL/L (ref 136–145)
WBC # BLD AUTO: 5.5 K/UL (ref 3.6–11)

## 2018-05-10 PROCEDURE — G8980 MOBILITY D/C STATUS: HCPCS

## 2018-05-10 PROCEDURE — 83880 ASSAY OF NATRIURETIC PEPTIDE: CPT | Performed by: PHYSICIAN ASSISTANT

## 2018-05-10 PROCEDURE — G8978 MOBILITY CURRENT STATUS: HCPCS

## 2018-05-10 PROCEDURE — 97161 PT EVAL LOW COMPLEX 20 MIN: CPT

## 2018-05-10 PROCEDURE — 99284 EMERGENCY DEPT VISIT MOD MDM: CPT

## 2018-05-10 PROCEDURE — 80053 COMPREHEN METABOLIC PANEL: CPT | Performed by: PHYSICIAN ASSISTANT

## 2018-05-10 PROCEDURE — 71046 X-RAY EXAM CHEST 2 VIEWS: CPT

## 2018-05-10 PROCEDURE — 36415 COLL VENOUS BLD VENIPUNCTURE: CPT | Performed by: PHYSICIAN ASSISTANT

## 2018-05-10 PROCEDURE — 97116 GAIT TRAINING THERAPY: CPT

## 2018-05-10 PROCEDURE — G8979 MOBILITY GOAL STATUS: HCPCS

## 2018-05-10 PROCEDURE — 85025 COMPLETE CBC W/AUTO DIFF WBC: CPT | Performed by: PHYSICIAN ASSISTANT

## 2018-05-10 PROCEDURE — 93005 ELECTROCARDIOGRAM TRACING: CPT
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
## 2018-05-10 NOTE — ED TRIAGE NOTES
Per daughter translating for pt she is here for bilat. foot swelling with left worse then right. Daughter states she just noted this last night. Per daughter no pain to feet or shortness of breath.
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
## 2018-05-10 NOTE — PROGRESS NOTES
Date of previous inpatient admission/ ED visit? ED visit 4/24/18 for a fall    What brought the patient back to ED? Patient in the ED today for bilateral foot swelling    Did patient decline recommended services during last admission/ ED visit (if yes, what)? No recommendation for services last ED visit. Noted [facility] [address] referral for [facility] during 3/31/18 hospitalization. Has patient seen a provider since their last inpatient admission/ED visit (if yes, when)? PCP visit 4/12/18    CM Interventions:  From previous inpatient admission/ED visit: [place] followed for skilled care. Daughter provided information on [facility]. From current inpatient admission/ED visit: Met w/patient, spouse, and daughter Radha Wise. Introduced to role of CM. EMR reviewed. Informed CM the need to have information regarding alternative LOC when appropriate. CM discussed medicaid AH versus private AL and medicaid LTC beds in NFs. Ms. Jelena Albrecht He has [facility] on file since daughter is Consumer Directive Agent since April however w/ additional family members also assisting not sure when patient will need placement. Provided [place] [facility] number (asked to request adult services regarding increasing LOC and the need for placement) and list of NFs. DME includes a RW.   [facility] is local pharmacy utilized. No additional needs verbalized. Transport home provided by family. Care Management Interventions  PCP Verified by CM: Yes  Last Visit to PCP: 04/10/18  Palliative Care Criteria Met (RRAT>21 & CHF Dx)?: No  Mode of Transport at Discharge:  Other (see comment) (family)  Transition of Care Consult (CM Consult): Discharge Planning  MyChart Signup: No  Discharge Durable Medical Equipment: No  Health Maintenance Reviewed: Yes  Physical Therapy Consult: Yes  Occupational Therapy Consult: No  Speech Therapy Consult: No  Current Support Network: Lives with Caregiver, Own Home, Lives with Spouse  Confirm Follow Up Transport: Family  Plan discussed with Pt/Family/Caregiver: Yes  [place] Resource Information Provided?: No  Discharge Location  Discharge Placement: Home

## 2018-05-10 NOTE — ED NOTES
Discharge instructions reviewed by doctor with family. Patient via wheelchair to waiting room, accompanied by family.

## 2018-05-10 NOTE — ED NOTES
2:54 PM  I have evaluated the patient as the Provider in Triage. I have reviewed Her vital signs and the triage nurse assessment. I have talked with the patient and any available family and advised that I am the provider in triage and have ordered the appropriate study to initiate their work up based on the clinical presentation during my assessment. I have advised that the patient will be accommodated in the Main ED as soon as possible. I have also requested to contact the triage nurse or myself immediately if the patient experiences any changes in their condition during this brief waiting period. Patient presents with 1 day history of bilateral foot swelling, left worse than right. She reports \"hearing her pulse\" in her left ear, and has also had some difficulty walking, slightly worse from baseline. Denies chest pain, shortness of breath or leg pain.   COSTA Hobbs

## 2018-05-10 NOTE — DISCHARGE INSTRUCTIONS
Leg and Ankle Edema: Care Instructions  Your Care Instructions  Swelling in the legs, ankles, and feet is called edema. It is common after you sit or stand for a while. Long plane flights or car rides often cause swelling in the legs and feet. You may also have swelling if you have to stand for long periods of time at your job. Problems with the veins in the legs (varicose veins) and changes in hormones can also cause swelling. Sometimes the swelling in the ankles and feet is caused by a more serious problem, such as heart failure, infection, blood clots, or liver or kidney disease. Follow-up care is a key part of your treatment and safety. Be sure to make and go to all appointments, and call your doctor if you are having problems. It's also a good idea to know your test results and keep a list of the medicines you take. How can you care for yourself at home? · If your doctor gave you medicine, take it as prescribed. Call your doctor if you think you are having a problem with your medicine. · Whenever you are resting, raise your legs up. Try to keep the swollen area higher than the level of your heart. · Take breaks from standing or sitting in one position. ¨ Walk around to increase the blood flow in your lower legs. ¨ Move your feet and ankles often while you stand, or tighten and relax your leg muscles. · Wear support stockings. Put them on in the morning, before swelling gets worse. · Eat a balanced diet. Lose weight if you need to. · Limit the amount of salt (sodium) in your diet. Salt holds fluid in the body and may increase swelling. When should you call for help? Call 911 anytime you think you may need emergency care. For example, call if:  ? · You have symptoms of a blood clot in your lung (called a pulmonary embolism). These may include:  ¨ Sudden chest pain. ¨ Trouble breathing. ¨ Coughing up blood.    ?Call your doctor now or seek immediate medical care if:  ? · You have signs of a blood clot, such as:  ¨ Pain in your calf, back of the knee, thigh, or groin. ¨ Redness and swelling in your leg or groin. ? · You have symptoms of infection, such as:  ¨ Increased pain, swelling, warmth, or redness. ¨ Red streaks or pus. ¨ A fever. ? Watch closely for changes in your health, and be sure to contact your doctor if:  ? · Your swelling is getting worse. ? · You have new or worsening pain in your legs. ? · You do not get better as expected. Where can you learn more? Go to http://jose juan-juancarlos.info/. Enter B539 in the search box to learn more about \"Leg and Ankle Edema: Care Instructions. \"  Current as of: March 20, 2017  Content Version: 11.4  © 5521-5936 [employer]. Care instructions adapted under license by [employer] (which disclaims liability or warranty for this information). If you have questions about a medical condition or this instruction, always ask your healthcare professional. [place] [address] any warranty or liability for your use of this information.

## 2018-05-10 NOTE — SENIOR SERVICES NOTE
Spoke with nurse. MD in a room with a patient. Patient ambulating with hand hold assisst x 120 feet. Slow but steady gait pattern. Decreased step clearance observed with bilateral LEs (L>R). Discussed the progression of dementia and common physical signs associated including a more shuffled gait pattern. No pain reported throughout. Per granddaughter, patient has \"stopped doing he exercises\". Secondary to cognitive status, patient is not appropriate for skilled PT at this time. Family members with patient 24/7 as needed. Granddaughter had questions about assisted living facilities. See CM notes. Cleared for discharge home with family. Full evaluation to follow.      Lisbeth Poole PT, DPT

## 2018-05-11 LAB
ATRIAL RATE: 72 BPM
CALCULATED P AXIS, ECG09: 1 DEGREES
CALCULATED R AXIS, ECG10: 40 DEGREES
CALCULATED T AXIS, ECG11: 42 DEGREES
DIAGNOSIS, 93000: NORMAL
P-R INTERVAL, ECG05: 178 MS
Q-T INTERVAL, ECG07: 394 MS
QRS DURATION, ECG06: 82 MS
QTC CALCULATION (BEZET), ECG08: 431 MS
VENTRICULAR RATE, ECG03: 72 BPM

## 2018-05-17 ENCOUNTER — DOCUMENTATION ONLY (OUTPATIENT)
Dept: INTERNAL MEDICINE CLINIC | Age: 73
End: 2018-05-17

## 2018-08-07 ENCOUNTER — DOCUMENTATION ONLY (OUTPATIENT)
Dept: INTERNAL MEDICINE CLINIC | Age: 73
End: 2018-08-07

## 2018-08-10 ENCOUNTER — TELEPHONE (OUTPATIENT)
Dept: INTERNAL MEDICINE CLINIC | Age: 73
End: 2018-08-10

## 2018-08-10 NOTE — TELEPHONE ENCOUNTER
Form for nutritional supplies faxed back to [facility] F # [phone] with all needed information

## 2018-08-10 NOTE — TELEPHONE ENCOUNTER
[employer] is calling from [facility] in regards to the medical necessity form for nutritional supplies. She states they received the completed form back however question 8 needs to be checked yes, B on number 8 needs to be soul source, and C needs to have height and weight listed for pt. Please re fax with this additional information. [employer] can be reached at [phone] if any questions.

## 2018-09-21 ENCOUNTER — OFFICE VISIT (OUTPATIENT)
Dept: INTERNAL MEDICINE CLINIC | Age: 73
End: 2018-09-21

## 2018-09-21 VITALS
OXYGEN SATURATION: 100 % | DIASTOLIC BLOOD PRESSURE: 53 MMHG | RESPIRATION RATE: 14 BRPM | WEIGHT: 104.13 LBS | TEMPERATURE: 98.8 F | SYSTOLIC BLOOD PRESSURE: 123 MMHG | HEIGHT: 56 IN | BODY MASS INDEX: 23.42 KG/M2 | HEART RATE: 87 BPM

## 2018-09-21 DIAGNOSIS — F03.91 DEMENTIA WITH BEHAVIORAL DISTURBANCE, UNSPECIFIED DEMENTIA TYPE: ICD-10-CM

## 2018-09-21 DIAGNOSIS — I87.2 VENOUS STASIS DERMATITIS OF BOTH LOWER EXTREMITIES: ICD-10-CM

## 2018-09-21 DIAGNOSIS — M79.89 LEG SWELLING: ICD-10-CM

## 2018-09-21 DIAGNOSIS — G47.01 INSOMNIA DUE TO MEDICAL CONDITION: Primary | ICD-10-CM

## 2018-09-21 RX ORDER — QUETIAPINE FUMARATE 25 MG/1
25 TABLET, FILM COATED ORAL
Qty: 30 TAB | Refills: 0 | Status: SHIPPED | OUTPATIENT
Start: 2018-09-21 | End: 2019-01-08 | Stop reason: SDUPTHER

## 2018-09-21 NOTE — PROGRESS NOTES
History of Present Illness:  
Joseph Dozier He is a 75 y.o. female here for evaluation: Chief Complaint Patient presents with  Insomnia Patient reports having trouble sleeping at night. Occuring for a while per patient's daughter.  Medication Evaluation Here with daughter and . Daughter notes with trazodone 50mg, she was losing weight. Stopped med and she started eating again. She has also stopped the risperidone as well. This was stopped due to problems swallowing pills. Had seen Dr. Elva Erazo but not seeing regularly now. Last seen early jan-Feb this year. Kimberlee Kruger has seen her prior, but no longer takes her insurance. The quetiapine made her hand shake--had as 25mg dose from 1/2 to 1 tab TID as needed. Reviewed meds as below at visit. Reviewed follow-up and PCP mgt of LE edema with daughter at visit. Past Medical History:  
Diagnosis Date  Dementia  History of bladder infections  Osteopenia Prior to Admission medications Medication Sig Start Date End Date Taking? Authorizing Provider LORazepam (INTENSOL) 2 mg/mL concentrated solution Take 0.5 mL by mouth every eight (8) hours as needed. Max Daily Amount: 3 mg. For anxiety, agitation 4/5/18  Yes Debra Calvert MD  
carbidopa-levodopa (PARCOPA)  mg rapid dissolve tablet Take 1 Tab by mouth three (3) times daily. 4/5/18   Debra Calvert MD  
donepezil 5 mg TbDi Take 5 mg by mouth nightly. 4/5/18   Debra Calvert MD  
traZODone (DESYREL) 10 mg/mL susp 10 mg/mL oral suspension (compounded) Take 5 mL by mouth nightly. 4/5/18   MD ALLY Cam Vitals:  
 09/21/18 1453 BP: 123/53 Pulse: 87 Resp: 14 Temp: 98.8 °F (37.1 °C) TempSrc: Oral  
SpO2: 100% Weight: 104 lb 2 oz (47.2 kg) Height: 4' 8\" (1.422 m) PainSc:   5 PainLoc: Foot Body mass index is 23.34 kg/(m^2). Physical Exam:  
 
Physical Exam  
 Constitutional: She appears well-developed and well-nourished. No distress. Cooperative during visit. Pt primarily standing at counter in exam room by sink during visit. HENT:  
Head: Normocephalic and atraumatic. Eyes: Conjunctivae are normal. Right eye exhibits no discharge. Left eye exhibits no discharge. No scleral icterus. Cardiovascular: Normal rate, regular rhythm, normal heart sounds and intact distal pulses. Exam reveals no gallop and no friction rub. No murmur heard. Pulmonary/Chest: Effort normal and breath sounds normal. No respiratory distress. She has no wheezes. She has no rales. She exhibits no tenderness. Abdominal: Soft. Bowel sounds are normal. She exhibits no distension. There is no tenderness. Musculoskeletal: She exhibits edema (1-2+ to mid-shin, bilat LE's. ). She exhibits no tenderness. Neurological: She is alert. She exhibits normal muscle tone. Coordination normal.  
Skin: Skin is warm. She is not diaphoretic. No pallor. Psychiatric: She has a normal mood and affect. Her behavior is normal. Judgment and thought content normal.  
 
 
Skin/LE exam (continued):   
Leg measurements: 
Right leg/calf measured 10cm below inferior pole of patella 34.5cm circumference. Left leg/calf measured 10cm below inferior pole of patella 36cm circumference. Reviewed with daughter/ at visit. Assessment and Plan: ICD-10-CM ICD-9-CM 1. Insomnia due to medical condition G47.01 327.01 QUEtiapine (SEROQUEL) 25 mg tablet REFERRAL TO PSYCHIATRY 2. Dementia with behavioral disturbance, unspecified dementia type F03.91 294.21 REFERRAL TO PSYCHIATRY 3. Leg swelling M79.89 729.81   
4. Venous stasis dermatitis of both lower extremities I87.2 454.1 1. Reviewed with daughter, that (of prior meds), quetiapine has been best tolerated. Plan PRN use HS only. Follow-up with behavioral health/psychiatry to manage with alternative meds reviewed at iit. 3,4:  No active infection at this time. Since only taking liquids for meds, and need to compound above medication, plan review long-term plan for mgt LE edema with PCP at follow-up. Follow-up Disposition: 
Return in about 2 weeks (around 10/5/2018) for medication follow-up, referral follow-up, leg swelling follow-up--PCP Dr. Jair Borrego here. reviewed medications and side effects in detail For additional documentation of information and/or recommendations discussed this visit, please see notes in instructions. Plan and evaluation (above) reviewed with pt/daughter at visit Daughter voiced understanding of plan and provided with time to ask/review questions. After Visit Summary (AVS) provided to pt/daughter after visit with additional instructions as needed/reviewed. Note:  They note right ulcerated area improving--ulcer and redness--with topical wound care they are using. Follow-up reviewed with PCP as above for discussion about fluid mgt.

## 2018-09-21 NOTE — MR AVS SNAPSHOT
[address] [address] [place] [address] 
[phone] Patient: Aria Hopper MRN: [id number] [id number] Visit Information Date & Time Provider Department Dept. Phone Encounter #  
 9/21/2018  2:45 PM Jose Meraz, [address] [place] [address] and Internal Medicine [phone] [id number] Follow-up Instructions Return in about 2 weeks (around 10/5/2018) for medication follow-up, referral follow-up, leg swelling follow-up--PCP Dr. Katlyn Madrigal here. Upcoming Health Maintenance Date Due DTaP/Tdap/Td series (1 - Tdap) 5/25/1965 FOBT Q 1 YEAR AGE 50-75 5/25/1994 ZOSTER VACCINE AGE 60> 3/25/2004 GLAUCOMA SCREENING Q2Y 5/25/2009 Pneumococcal 65+ Low/Medium Risk (2 of 2 - PPSV23) 7/12/2018 MEDICARE YEARLY EXAM 8/1/2018 Influenza Age 5 to Adult 8/1/2018 BREAST CANCER SCRN MAMMOGRAM 7/20/2019 Allergies as of 9/21/2018  Review Complete On: 9/21/2018 By: Jose Meraz MD  
 No Known Allergies Current Immunizations  Reviewed on 7/12/2017 Name Date Influenza Vaccine (Quad) PF 2/25/2018  1:49 PM  
 Pneumococcal Conjugate (PCV-13) 7/12/2017  4:56 PM  
  
 Not reviewed this visit You Were Diagnosed With   
  
 Codes Comments Insomnia due to medical condition    -  Primary ICD-10-CM: G47.01 
ICD-9-CM: 327.01 Dementia with behavioral disturbance, unspecified dementia type     ICD-10-CM: F03.91 
ICD-9-CM: 294.21 Leg swelling     ICD-10-CM: M79.89 ICD-9-CM: 729.81 Venous stasis dermatitis of both lower extremities     ICD-10-CM: I87.2 ICD-9-CM: 454.1 Vitals BP Pulse Temp Resp Height(growth percentile) Weight(growth percentile) 123/53 (BP 1 Location: Left arm, BP Patient Position: Sitting) 87 98.8 °F (37.1 °C) (Oral) 14 4' 8\" (1.422 m) 104 lb 2 oz (47.2 kg) SpO2 BMI OB Status Smoking Status 100% 23.34 kg/m2 Postmenopausal Never Smoker BMI and BSA Data Body Mass Index Body Surface Area  
 23.34 kg/m 2 1.37 m 2 Preferred Pharmacy Pharmacy Name Phone [id number] [place], [address] [facility] [phone] Your Updated Medication List  
  
   
This list is accurate as of 9/21/18  3:37 PM.  Always use your most recent med list.  
  
  
  
  
 carbidopa-levodopa  mg rapid dissolve tablet Commonly known as:  PARCOPA Take 1 Tab by mouth three (3) times daily. donepezil 5 mg Tbdi Take 5 mg by mouth nightly. LORazepam 2 mg/mL concentrated solution Commonly known as:  INTENSOL Take 0.5 mL by mouth every eight (8) hours as needed. Max Daily Amount: 3 mg. For anxiety, agitation QUEtiapine 25 mg tablet Commonly known as:  SEROquel Take 1 Tab by mouth nightly as needed. For sleep. traZODone 10 mg/mL Susp 10 mg/mL oral suspension (compounded) Commonly known as:  Debbe Gunner Take 5 mL by mouth nightly. Prescriptions Printed Refills QUEtiapine (SEROQUEL) 25 mg tablet 0 Sig: Take 1 Tab by mouth nightly as needed. For sleep. Class: Print Route: Oral  
  
We Performed the Following REFERRAL TO PSYCHIATRY [REF91 Custom] Comments:  
 Please evaluate for insomnia, dementia, behavioral mgt. Follow-up Instructions Return in about 2 weeks (around 10/5/2018) for medication follow-up, referral follow-up, leg swelling follow-up--PCP Dr. Veronica Sandy here. Referral Information Referral ID Referred By Referred To  
  
 [id number] Errol Diamond MD   
   [facility] [address] [place] [address], [facility] Phone: [phone] Fax: [phone] Visits Status Start Date End Date 1 New Request 9/21/18 9/21/19 If your referral has a status of pending review or denied, additional information will be sent to support the outcome of this decision. Patient Instructions 1. Please schedule appt with Dr. Jef Zuniga at the location below: 
 
[address] [facility] (Outpatient) [place], Practice  [place], [address] [facility], [address] [facility], [address] [phone] fax [phone] Sultan YUMIKO Zuniga MD   
 
 
If you prefer, you can also see Dr. Jef Zuniga at the location below, as reviewed: Sher[place] Tubbs 5 at [facility] (Outpatient) [address] [facility] [address], [facility], [address], [place], [address] Phone [phone] fax [phone] Sultan YUMIKO Zuniga MD   
 
 
2. If [address] [facility] is unable to compound the medication, you can see if [facility] [address] or [facility] [place] can compound for you instead. [facility] [address], [address] Phone [phone] Fax [phone] Hours 9am  6pm Monday  Friday 
9am  1pm Saturday OR [facility] Location: 
[address] [facility], [address] [facility] 
[phone] 
fax: [phone] Introducing [facility]! [employer] introduces Social Media Simplified patient portal. Now you can access parts of your medical record, email your doctor's office, and request medication refills online. 1. In your internet browser, go to [email or web address] 2. Click on the First Time User? Click Here link in the Sign In box. You will see the New Member Sign Up page. 3. Enter your [employer] Simplified Access Code exactly as it appears below. You will not need to use this code after youve completed the sign-up process. If you do not sign up before the expiration date, you must request a new code. · [employer] Sim[employer]ified Access Code: [email or web address] Expires: 12/20/2018  2:36 PM 
 
4. Enter the last four digits of your Social Security Number (xxxx) and Date of Birth (mm/dd/yyyy) as indicated and click Submit. You will be taken to the next sign-up page. 5. Create a [employer] ID. This will be your [employer] login ID and cannot be changed, so think of one that is secure and easy to remember. 6. Create a [employer] password. You can change your password at any time. 7. Enter your Password Reset Question and Answer. This can be used at a later time if you forget your password. 8. Enter your e-mail address. You will receive e-mail notification when new information is available in [address] 9. Click Sign Up. You can now view and download portions of your medical record. 10. Click the Download Summary menu link to download a portable copy of your medical information. If you have questions, please visit the Frequently Asked Questions section of the [employer] website. Remember, [employer] is NOT to be used for urgent needs. For medical emergencies, dial 911. Now available from your iPhone and Android! Please provide this summary of care documentation to your next provider. Your primary care clinician is listed as Lana Camargo. If you have any questions after today's visit, please call [phone].

## 2018-09-21 NOTE — PATIENT INSTRUCTIONS
1.  Please schedule appt with Dr. Ravi Garcia at the location below: 
 
[address] (Outpatient) Jennifer [place]itt, Practice  [place], [address] [facility], [address] [facility], [address] [phone] fax [phone] Sultan YUMIKO Garcia MD   
 
 
If you prefer, you can also see Dr. Ravi Garcia at the location below, as reviewed: Sherice Tubbs 5 at [facility] (Outpatient) [address] [facility], [facility], [address], [place], [address] Phone [phone] fax [phone] Sultan YUMIKO Garcia MD   
 
 
2. If [address] [place] [address] is unable to compound the medication, you can see if [facility] [address] or [facility] can compound for you instead. [facility] [address] Via [employer] [address] [place] [address], [address] Phone [phone] Fax [phone] Hours 9am  6pm Monday  Friday 
9am  1pm Saturday OR [facility] Location: 
[address] [facility], [address] [facility] 
[phone] 
fax: [phone]

## 2018-09-21 NOTE — PROGRESS NOTES
RM 16 
 
Present with daughter and . Chief Complaint Patient presents with  Insomnia Patient reports having trouble sleeping at night. Occuring for a while per patient's daughter.  Medication Evaluation 1. Have you been to the ER, urgent care clinic since your last visit? Hospitalized since your last visit? Yes Reason for visit: 5/10/18, [facility], foot swelling. 4/24/18, [facility], fall/head injury 2. Have you seen or consulted any other health care providers outside of the [address] [place] [address] [place] since your last visit? Include any pap smears or colon screening. No 
 
Health Maintenance Due Topic Date Due  
 DTaP/Tdap/Td series (1 - Tdap) 05/25/1965  FOBT Q 1 YEAR AGE 50-75  05/25/1994  ZOSTER VACCINE AGE 60>  03/25/2004  GLAUCOMA SCREENING Q2Y  05/25/2009  Pneumococcal 65+ Low/Medium Risk (2 of 2 - PPSV23) 07/12/2018  MEDICARE YEARLY EXAM  08/01/2018  Influenza Age 5 to Adult  08/01/2018 PHQ over the last two weeks 9/21/2018 Little interest or pleasure in doing things Not at all Feeling down, depressed, irritable, or hopeless Not at all Total Score PHQ 2 0 Fall Risk Assessment, last 12 mths 9/21/2018 Able to walk? Yes Fall in past 12 months? Yes Fall with injury? Yes  
Number of falls in past 12 months 4 Fall Risk Score 5 Learning Assessment 9/21/2018 PRIMARY LEARNER Patient BARRIERS PRIMARY LEARNER NONE PRIMARY LANGUAGE CHINESE (MANDARIN) LEARNER PREFERENCE PRIMARY DEMONSTRATION  
ANSWERED BY patient RELATIONSHIP SELF

## 2018-10-04 ENCOUNTER — OFFICE VISIT (OUTPATIENT)
Dept: INTERNAL MEDICINE CLINIC | Age: 73
End: 2018-10-04

## 2018-10-04 VITALS
TEMPERATURE: 98.1 F | RESPIRATION RATE: 16 BRPM | BODY MASS INDEX: 22.72 KG/M2 | DIASTOLIC BLOOD PRESSURE: 65 MMHG | WEIGHT: 101 LBS | SYSTOLIC BLOOD PRESSURE: 127 MMHG | HEIGHT: 56 IN | OXYGEN SATURATION: 99 % | HEART RATE: 79 BPM

## 2018-10-04 DIAGNOSIS — I87.2 VENOUS STASIS DERMATITIS OF BOTH LOWER EXTREMITIES: ICD-10-CM

## 2018-10-04 DIAGNOSIS — R73.09 ELEVATED GLUCOSE: ICD-10-CM

## 2018-10-04 DIAGNOSIS — R44.3 HALLUCINATION: ICD-10-CM

## 2018-10-04 DIAGNOSIS — N39.41 URGE INCONTINENCE OF URINE: ICD-10-CM

## 2018-10-04 DIAGNOSIS — R31.9 HEMATURIA, UNSPECIFIED TYPE: ICD-10-CM

## 2018-10-04 DIAGNOSIS — F03.91 DEMENTIA WITH BEHAVIORAL DISTURBANCE, UNSPECIFIED DEMENTIA TYPE: ICD-10-CM

## 2018-10-04 DIAGNOSIS — R60.0 LOWER EXTREMITY EDEMA: Primary | ICD-10-CM

## 2018-10-04 LAB
BILIRUB UR QL STRIP: NEGATIVE
GLUCOSE UR-MCNC: NEGATIVE MG/DL
KETONES P FAST UR STRIP-MCNC: NEGATIVE MG/DL
PH UR STRIP: 6 [PH] (ref 4.6–8)
PROT UR QL STRIP: NEGATIVE
SP GR UR STRIP: 1.02 (ref 1–1.03)
UA UROBILINOGEN AMB POC: NORMAL (ref 0.2–1)
URINALYSIS CLARITY POC: CLEAR
URINALYSIS COLOR POC: YELLOW
URINE BLOOD POC: NORMAL
URINE LEUKOCYTES POC: NORMAL
URINE NITRITES POC: NEGATIVE

## 2018-10-04 NOTE — MR AVS SNAPSHOT
[address] [place] [address] 
[phone] Patient: Bernie Hopper MRN: [id number] MTO:[id number] Visit Information Date & Time Provider Department Dept. Phone Encounter #  
 10/4/2018 10:30 AM Lamar Elias MD [facility] [phone] [id number] Follow-up Instructions Return in about 1 month (around 11/4/2018) for follow-up lower extremity swelling. Your Appointments 1/8/2019  3:00 PM  
New Patient with Diane Rincon MD  
[facility]) Appt Note: NP referred by Dr. Jason Harp- dx: dementia- told to arrive at [address] [place] [address] [address] [phone]  
[facility] [place] [address] [place] [address]. Upcoming Health Maintenance Date Due DTaP/Tdap/Td series (1 - Tdap) 5/25/1965 Shingrix Vaccine Age 50> (1 of 2) 5/25/1994 FOBT Q 1 YEAR AGE 50-75 5/25/1994 GLAUCOMA SCREENING Q2Y 5/25/2009 Pneumococcal 65+ Low/Medium Risk (2 of 2 - PPSV23) 7/12/2018 MEDICARE YEARLY EXAM 8/1/2018 Influenza Age 5 to Adult 8/1/2018 BREAST CANCER SCRN MAMMOGRAM 7/20/2019 Allergies as of 10/4/2018  Review Complete On: 9/21/2018 By: Jenifer Yañez MD  
 No Known Allergies Current Immunizations  Reviewed on 7/12/2017 Name Date Influenza Vaccine (Quad) PF 2/25/2018  1:49 PM  
 Pneumococcal Conjugate (PCV-13) 7/12/2017  4:56 PM  
  
 Not reviewed this visit You Were Diagnosed With   
  
 Codes Comments Lower extremity edema    -  Primary ICD-10-CM: R60.0 ICD-9-CM: 387. 3 Venous stasis dermatitis of both lower extremities     ICD-10-CM: I87.2 ICD-9-CM: 454.1 Elevated glucose     ICD-10-CM: R73.09 
ICD-9-CM: 790.29  Urge incontinence of urine     ICD-10-CM: N39.41 
ICD-9-CM: 788.31   
 Dementia with behavioral disturbance, unspecified dementia type     ICD-10-CM: F03.91 
ICD-9-CM: 294.21 Hallucination     ICD-10-CM: R44.3 ICD-9-CM: 780.1 Vitals BP Pulse Temp Resp Height(growth percentile) Weight(growth percentile) 127/65 (BP 1 Location: Left arm, BP Patient Position: Sitting) 79 98.1 °F (36.7 °C) (Oral) 16 4' 8\" (1.422 m) 101 lb (45.8 kg) SpO2 BMI OB Status Smoking Status 99% 22.64 kg/m2 Postmenopausal Never Smoker Vitals History BMI and BSA Data Body Mass Index Body Surface Area  
 22.64 kg/m 2 1.35 m 2 Preferred Pharmacy Pharmacy Name Phone [id number] [address] [phone] Your Updated Medication List  
  
   
This list is accurate as of 10/4/18 11:38 AM.  Always use your most recent med list.  
  
  
  
  
 carbidopa-levodopa  mg rapid dissolve tablet Commonly known as:  PARCOPA Take 1 Tab by mouth three (3) times daily. donepezil 5 mg Tbdi Take 5 mg by mouth nightly. LORazepam 2 mg/mL concentrated solution Commonly known as:  INTENSOL Take 0.5 mL by mouth every eight (8) hours as needed. Max Daily Amount: 3 mg. For anxiety, agitation QUEtiapine 25 mg tablet Commonly known as:  SEROquel Take 1 Tab by mouth nightly as needed. For sleep. We Performed the Following AMB POC URINALYSIS DIP STICK AUTO W/O MICRO [08676 CPT(R)] HEMOGLOBIN A1C WITH EAG [58929 CPT(R)] METABOLIC PANEL, BASIC [51500 CPT(R)] Follow-up Instructions Return in about 1 month (around 11/4/2018) for follow-up lower extremity swelling. Patient Instructions   
- continue to keep daytime home bright, with curtains open. - continue to try to give seroquel liquid (quetiapine) at night. Follow-up with Dr. Franics Drummond (psychiatrist) as scheduled in January). Please see if you can be seen with neurologist to help verify diagnosis of 
 
 
  
Helping A Person With Dementia: Care Instructions Your Care Instructions Dementia is a loss of mental skills that affects daily life. It is different from mild memory loss that occurs with aging. Dementia can cause problems with memory, thinking clearly, and planning. It is different for everyone. But it usually gets worse slowly. Some people who have dementia can function well for a long time. But at some point it may become hard for the person to care for himself or herself. It can be upsetting to learn that a loved one has this condition. You may be afraid and worried about what will happen. You may wonder how you will care for the person. There is no cure for dementia. But medicine may be able to slow memory loss and improve thinking for a while. Other medicines may help with sleep, depression, and behavior changes. Dementia is different for everyone. In some cases, people can function well for a long time. You can help your loved one by making his or her home life easier and safer. You also need to take care of yourself. Caregiving can be stressful. But support is available to help you and give you a break when you need it. The Alzheimer's Association offers good information and support. If you are caring for someone with dementia, you can help make life safer and more comfortable. You can also help your loved one make decisions about future care. You may also want to bring up legal and financial issues. These are hard but important conversations to have. Follow-up care is a key part of your loved one's treatment and safety. Be sure to make and go to all appointments, and call your doctor if your loved one is having problems. It's also a good idea to know your loved one's test results and keep a list of the medicines he or she takes. How can you care for your loved one at home? Taking care of the person · If the person takes medicine for dementia, help him or her take it exactly as prescribed. Call the doctor if you notice any problems with the medicine. · Make a list of the person's medicines. Review it with all of his or her doctors. · Help the person eat a balanced diet. Serve plenty of whole grains, fruits, and vegetables every day. If the person is not hungry at mealtimes, give snacks at midmorning and in the afternoon. Offer drinks such as Boost, Ensure, or Sustacal if the person is losing weight. · Encourage exercise. Walking and other activities may slow the decline of mental ability. Help the person stay active mentally with reading, crossword puzzles, or other hobbies. · Take steps to help if the person is sundowning. This is the restless behavior and trouble with sleeping that may occur in late afternoon and at night. Try not to let the person nap during the day. Offer a glass of warm milk or caffeine-free tea before bedtime. · Develop a routine. Your loved one will feel less frustrated or confused with a clear, simple plan of what to do every day. · Be patient. A task may take the person longer than it used to. · For as long as he or she is able, allow your loved one to make decisions about activities, food, clothing, and other choices. Let him or her be independent, even if tasks take more time or are not done perfectly. Tailor tasks to the person's abilities. For example, if cooking is no longer safe, ask for other help. Your loved one can help set the table, or make simple dishes such as a salad. When the person needs help, offer it gently. Staying safe · Make your home (or your loved one's home) safe. Tack down rugs, and put no-slip tape in the tub. Install handrails, and put safety switches on stoves and appliances. Keep rooms free of clutter. Make sure walkways around furniture are clear. Do not move furniture around, because the person may become confused. · Use locks on doors and cupboards. Lock up knives, scissors, medicines, cleaning supplies, and other dangerous things. · Do not let the person drive or cook if he or she can't do it safely. A person with dementia should not drive unless he or she is able to pass an on-road driving test. Your state 's license bureau can do a driving test if there is any question. · Get medical alert jewelry for the person so that you can be contacted if he or she wanders away. If possible, provide a safe place for wandering, such as an enclosed yard or garden. Taking care of yourself · Ask your doctor about support groups and other resources in your area. · Take care of your health. Be sure to eat healthy foods and get enough rest and exercise. · Take time for yourself. Respite services provide someone to stay with the person for a short time while you get out of the house for a few hours. · Make time for an activity that you enjoy. Read, listen to music, paint, do crafts, or play an instrument, even if it's only for a few minutes a day. · Spend time with family, friends, and others in your support system. When should you call for help? Call 911 anytime you think the person may need emergency care. For example, call if: 
  · The person who has dementia wanders away and you can't find him or her.  
  · The person who has dementia is seriously injured.  
 Call the doctor now or seek immediate medical care if: 
  · The person suddenly sees things that are not there (hallucinates).  
  · The person has a sudden change in his or her behavior.  
 Watch closely for changes in the person's health, and be sure to contact the doctor if: 
  · The person has symptoms that could cause injury.  
  · The person has problems with his or her medicine.  
  · You need more information to care for a person with dementia.  
  · You need respite care so you can take a break. Where can you learn more? Go to http://jose juan-juancarlos.info/. Enter U457 in the search box to learn more about \"Helping A Person With Dementia: Care Instructions. \" Current as of: December 7, 2017 Content Version: 11.8 © 3271-7250 Healthwise[employer] Incorporated. Care instructions adapted under license by [employer] (which disclaims liability or warranty for this information). If you have questions about a medical condition or this instruction, always ask your healthcare professional. [address] any warranty or liability for your use of this information. Introducing [facility]! [facility] introduces [employer] patient portal. Now you can access parts of your medical record, email your doctor's office, and request medication refills online. 1. In your internet browser, go to [email or web address] 2. Click on the First Time User? Click Here link in the Sign In box. You will see the New Member Sign Up page. 3. Enter your [employer] Access Code exactly as it appears below. You will not need to use this code after youve completed the sign-up process. If you do not sign up before the expiration date, you must request a new code. · [employer] Access Code: [email or web address] Expires: 12/20/2018  2:36 PM 
 
4. Enter the last four digits of your Social Security Number (xxxx) and Date of Birth (mm/dd/yyyy) as indicated and click Submit. You will be taken to the next sign-up page. 5. Create a [employer] ID. This will be your [employer] login ID and cannot be changed, so think of one that is secure and easy to remember. 6. Create a [employer] password. You can change your password at any time. 7. Enter your Password Reset Question and Answer. This can be used at a later time if you forget your password. 8. Enter your e-mail address. You will receive e-mail notification when new information is available in [address] 9. Click Sign Up. You can now view and download portions of your medical record. 10. Click the Download Summary menu link to download a portable copy of your medical information. If you have questions, please visit the Frequently Asked Questions section of the [employer] website. Remember, [employer] is NOT to be used for urgent needs. For medical emergencies, dial 911. Now available from your iPhone and Android! Please provide this summary of care documentation to your next provider. Your primary care clinician is listed as Eileen Pan. If you have any questions after today's visit, please call [phone].

## 2018-10-04 NOTE — PROGRESS NOTES
HPI 
 Christopher Bearden He is a 75 y.o. female, she presents today for: In clinic 2 weeks ago with new wound on right lower leg. Referred to Dr. Barbara Srinivasan for further aid with treatment of dementia with mood disturbance/delusion/hallucination. Have referred to neurology and psychiatry previously. Has not completed evaluations recently. When discharged from inpatient psyc stay in a April. And daughter did not like management via care more provider. Now with new insurance. Appointment on books with Dr. Barbara Srinivasan January. Daughter provides history:  also present. They are primary care providers for Mrs. Layne He 
 - eating and appetite is okay. Weight seems to still be low depsite this. - mood remains up and down. - ongoing sleep disturbance with wandering and trying to go upstairs. Hears someone calling her to upstairs Can be found standing on stairs still for several areas. - is wrapping and packing things to be hidden. - will sometimes become aggressive physically with family members, has to be commanded. Tries to take mother outside in parking lot 1-2 times per day, Seems to know day and night. Will have short sleeps during the day. Has tried crushing medications into food. And she seems to taste and is very picky. Likes to mix up things to drink. Refusing all medication, cannot give. No medications at this time. Did take quetiapine 1 time without much change in sleep. PMH/PSH: reviewed and updated Sochx:  reports that she has never smoked. She has never used smokeless tobacco. She reports that she does not drink alcohol or use illicit drugs. Famhx: reviewed and updated All: No Known Allergies Med:  
Current Outpatient Prescriptions Medication Sig  QUEtiapine (SEROQUEL) 25 mg tablet Take 1 Tab by mouth nightly as needed. For sleep.   
 LORazepam (INTENSOL) 2 mg/mL concentrated solution Take 0.5 mL by mouth every eight (8) hours as needed. Max Daily Amount: 3 mg. For anxiety, agitation  carbidopa-levodopa (PARCOPA)  mg rapid dissolve tablet Take 1 Tab by mouth three (3) times daily.  donepezil 5 mg TbDi Take 5 mg by mouth nightly.  traZODone (DESYREL) 10 mg/mL susp 10 mg/mL oral suspension (compounded) Take 5 mL by mouth nightly. No current facility-administered medications for this visit. Review of Systems Constitutional: Negative for chills, fever and malaise/fatigue. Respiratory: Negative for cough. remainder of review of systems limited by dementia. See HPI for more details. PE: 
Blood pressure 127/65, pulse 79, temperature 98.1 °F (36.7 °C), temperature source Oral, resp. rate 16, height 4' 8\" (1.422 m), weight 101 lb (45.8 kg), SpO2 99 %. Body mass index is 22.64 kg/(m^2). Physical Exam  
Constitutional: She appears well-developed and well-nourished. No distress. HENT:  
Head: Normocephalic. Mouth/Throat: Oropharynx is clear and moist.  
Eyes: Conjunctivae and EOM are normal.  
Neck: Neck supple. No thyromegaly present. Cardiovascular: Normal rate, regular rhythm and normal heart sounds. Pulmonary/Chest: Effort normal and breath sounds normal. No respiratory distress. She has no wheezes. Abdominal: Soft. Musculoskeletal:  
Lower extremities with light pink erythema and warm in dependent region of lower calf. Edema > on left than right today. Healing stasis uncler on right leg. 2+ arterial pedal pulses. Not painful to touch. Neurological: She is alert. Normal facies with good symmetry. Smiles, bows, shakes hands. Seems relaxed and comfortable, but requires redirection a few times to stay seated during encounter. No localized weakness. Skin: Skin is warm and dry. Nursing note and vitals reviewed. Labs:  
See addendum U/A without sign of infection, but trace blood and leuks. Culture requested. A/P: 
75 y.o. female ICD-10-CM ICD-9-CM 1. Lower extremity edema G43.9 829.8 METABOLIC PANEL, BASIC 2. Venous stasis dermatitis of both lower extremities I97.5 240.1 METABOLIC PANEL, BASIC 3. Elevated glucose R73.09 790.29 HEMOGLOBIN A1C WITH EAG 4. Urge incontinence of urine N39.41 788.31 AMB POC URINALYSIS DIP STICK AUTO W/O MICRO 5. Dementia with behavioral disturbance, unspecified dementia type F03.91 294.21 REFERRAL TO NEUROLOGY  
   CULTURE, URINE 6. Hallucination R44.3 780.1 CULTURE, URINE 7. Hematuria, unspecified type R31.9 599.70 CULTURE, URINE Lower extremity edema, suspect fro mvenous stasis:  Discussed use of compression stockings during the day or when not in bed. Family soaking legs in water and providing massages, this seems to be helping healing. Improved from prior. Continue manual therapies. Recheck labs today to make sure metabolic status is good (albumin, kindey function. Urge incontinence. Not a good candidate for oral therapies. Continue with diaper as needed. Dementia: severe, with associated hallucitination/vs delusion. Has had 1 hospitalization for aggressive and wandering behavior. Will request futher assessment with neurology. Appreciate assistance of Dr. Beth Bai. Upcoming appointment in January with Dr. Frandy Shafer.  
 
 - She was given AVS and expressed understanding with the diagnosis and plan as discussed. Follow-up Disposition: 
Return in about 1 month (around 11/4/2018) for follow-up lower extremity swelling. Future Appointments Date Time Provider Kyler Dsouza 1/8/2019 3:00 PM Tung Earl MD [address] [place] [address]

## 2018-10-04 NOTE — PROGRESS NOTES
Rm#2 
Presents w/ daughter and  Chief Complaint Patient presents with  Leg Swelling f/u red, erythemic round areas on lower legs bilaterally  Medication Evaluation  
  dementia f/u  Referral Follow Up  
 Incontinence  
  urinary incontinence  Insomnia 1. Have you been to the ER, urgent care clinic since your last visit? Hospitalized since your last visit? No 
 
2. Have you seen or consulted any other health care providers outside of the [address] [place] [address] since your last visit? Include any pap smears or colon screening. No 
Health Maintenance Due Topic Date Due  
 DTaP/Tdap/Td series (1 - Tdap) 05/25/1965  Shingrix Vaccine Age 50> (1 of 2) 05/25/1994  
 FOBT Q 1 YEAR AGE 50-75  05/25/1994  GLAUCOMA SCREENING Q2Y  05/25/2009  Pneumococcal 65+ Low/Medium Risk (2 of 2 - PPSV23) 07/12/2018  MEDICARE YEARLY EXAM  08/01/2018  Influenza Age 5 to Adult  08/01/2018 Wants flu vaccine today

## 2018-10-05 ENCOUNTER — OFFICE VISIT (OUTPATIENT)
Dept: NEUROLOGY | Age: 73
End: 2018-10-05

## 2018-10-05 VITALS
RESPIRATION RATE: 16 BRPM | HEART RATE: 90 BPM | OXYGEN SATURATION: 97 % | WEIGHT: 103.4 LBS | HEIGHT: 56 IN | SYSTOLIC BLOOD PRESSURE: 130 MMHG | BODY MASS INDEX: 23.26 KG/M2 | DIASTOLIC BLOOD PRESSURE: 54 MMHG | TEMPERATURE: 96.7 F

## 2018-10-05 DIAGNOSIS — G31.83 LEWY BODY DEMENTIA WITH BEHAVIORAL DISTURBANCE (HCC): Primary | ICD-10-CM

## 2018-10-05 DIAGNOSIS — F02.818 LEWY BODY DEMENTIA WITH BEHAVIORAL DISTURBANCE (HCC): Primary | ICD-10-CM

## 2018-10-05 LAB
BACTERIA UR CULT: NORMAL
BUN SERPL-MCNC: 18 MG/DL (ref 8–27)
BUN/CREAT SERPL: 22 (ref 12–28)
CALCIUM SERPL-MCNC: 9.5 MG/DL (ref 8.7–10.3)
CHLORIDE SERPL-SCNC: 108 MMOL/L (ref 96–106)
CO2 SERPL-SCNC: 18 MMOL/L (ref 20–29)
CREAT SERPL-MCNC: 0.83 MG/DL (ref 0.57–1)
EST. AVERAGE GLUCOSE BLD GHB EST-MCNC: 108 MG/DL
GLUCOSE SERPL-MCNC: 97 MG/DL (ref 65–99)
HBA1C MFR BLD: 5.4 % (ref 4.8–5.6)
POTASSIUM SERPL-SCNC: 4.2 MMOL/L (ref 3.5–5.2)
SODIUM SERPL-SCNC: 146 MMOL/L (ref 134–144)

## 2018-10-05 NOTE — PROGRESS NOTES
Chief Complaint Patient presents with  Follow-up Pt here for a f/u for dementia. HPI 
 
50-year-old woman whom I saw in the hospital in April of this year for altered mental status. She has a baseline history of dementia. When I saw her at the time she was back to her baseline. She has a history of Parkinson's disease according to her record and her daughter who is present. She sees psychiatry. She does not take any of her medications. The only medication she takes under duress is Seroquel and a liquid formulation which she takes at night but not daily. She is awake most of the night roaming around. Doors have to be locked. She can get angry and aggressive at times. She hallucinates daily. Sometimes she has clear processing but much of the time she is demented. Her level of function is that she can slowly dress, feed herself, bathe herself and toilet without assistance. She does have a tendency to fall backwards. Bowel movement every other day. She is mostly nonverbal with the exception if she is angry or having hallucinations. Review of Systems Unable to perform ROS: Dementia Past Medical History:  
Diagnosis Date  Dementia  History of bladder infections  Osteopenia History reviewed. No pertinent family history. Social History Social History  Marital status:  Spouse name: N/A  
 Number of children: N/A  
 Years of education: N/A Occupational History  Not on file. Social History Main Topics  Smoking status: Never Smoker  Smokeless tobacco: Never Used  Alcohol use No  
 Drug use: No  
 Sexual activity: Not on file Other Topics Concern  Not on file Social History Narrative 66 year vold  female admitted voluntarily for dementia. Pt lives with her daughter, who reports pt. Has been more confused, wandering and occasionally aggressive in the last 3 weeks.  Her internist just changed the pt from haldol to zyprexa because the pt complained of sedation. Pt is to return home to her daughter after treatment. She is able to speak and understand limited [place]. No Known Allergies Current Outpatient Prescriptions Medication Sig  QUEtiapine (SEROQUEL) 25 mg tablet Take 1 Tab by mouth nightly as needed. For sleep.  LORazepam (INTENSOL) 2 mg/mL concentrated solution Take 0.5 mL by mouth every eight (8) hours as needed. Max Daily Amount: 3 mg. For anxiety, agitation  carbidopa-levodopa (PARCOPA)  mg rapid dissolve tablet Take 1 Tab by mouth three (3) times daily.  donepezil 5 mg TbDi Take 5 mg by mouth nightly. No current facility-administered medications for this visit. Neurologic Exam  
 
Mental Status WD/WN adult in NAD, normal grooming VSS Elderly woman awake. She seems a little drowsy but she wakes up when I talk to her. She can tell me her name. She cannot follow commands. She does not speak [place]. PERRL, nonicteric, reduced blink rate Face is symmetric, tongue midline, masklike face Speech is minimal and clear with appropriate volume No limb ataxia. No abnl movements. No tremor Bradykinetic with movement Gait is narrow but shuffling CVS RRR Lungs nonlabored Skin is warm and dry Visit Vitals  /54 (BP 1 Location: Right arm, BP Patient Position: Sitting)  Pulse 90  Temp 96.7 °F (35.9 °C) (Oral)  Resp 16  
 Ht 4' 8\" (1.422 m)  Wt 46.9 kg (103 lb 6.4 oz)  SpO2 97%  BMI 23.18 kg/m2 Assessment and Plan Diagnoses and all orders for this visit: 1. Lewy body dementia with behavioral disturbance 74-year-old woman with what I suspect is dementia with Lewy body (DLB). She has some parkinsonian features to also include shuffling gait, masklike face. The hallucinations are somewhat pathognomonic for DLB.   At this point I think her sleep needs to be optimized as much as possible to help avoid daytime napping. I think it is reasonable to start Seroquel as a standing dose every evening at bedtime. Patient is not willing to take any other medications. She is Tung tried carbidopa and daughter says it did not help at all. Typically, with this condition carbidopa is of minimal benefit if any. Seroquel does have a black box warning for stroke which the daughter is aware of. I think it is reasonable to do low-dose at this time. Daughter obtains this medication at a specialty pharmacy where it compounded into a liquid formulation. Try this nightly for now. I would like to see her in 3 months. [facility], DO 
NEUROLOGIST Diplomate BEKAH

## 2018-10-05 NOTE — MR AVS SNAPSHOT
[facility] [address] [place] [address] 
[phone] Patient: Leland Hopper MRN: [id number] Visit Information Date & Time Provider Department Dept. Phone Encounter #  
 10/5/2018  1:20  [facility], [address] Neurology Clinic at [facility] [phone] Follow-up Instructions Return in about 3 months (around 1/5/2019). Your Appointments 1/8/2019  3:00 PM  
New Patient with Rocio Miguel MD  
[address] [facility]) Appt Note: NP referred by Dr. Asher Morse- dx: dementia- told to arrive at [address] [place] [address] [phone]  
[facility] [address] Upcoming Health Maintenance Date Due DTaP/Tdap/Td series (1 - Tdap) 5/25/1965 Shingrix Vaccine Age 50> (1 of 2) 5/25/1994 FOBT Q 1 YEAR AGE 50-75 5/25/1994 GLAUCOMA SCREENING Q2Y 5/25/2009 Pneumococcal 65+ Low/Medium Risk (2 of 2 - PPSV23) 7/12/2018 MEDICARE YEARLY EXAM 8/1/2018 Influenza Age 5 to Adult 8/1/2018 BREAST CANCER SCRN MAMMOGRAM 7/20/2019 Allergies as of 10/5/2018  Review Complete On: 10/5/2018 By: 812 [facility], DO No Known Allergies Current Immunizations  Reviewed on 7/12/2017 Name Date Influenza Vaccine (Quad) PF 2/25/2018  1:49 PM  
 Pneumococcal Conjugate (PCV-13) 7/12/2017  4:56 PM  
  
 Not reviewed this visit You Were Diagnosed With   
  
 Codes Comments Lewy body dementia with behavioral disturbance    -  Primary ICD-10-CM: G31.83, F02.81 ICD-9-CM: 331.82, 294.11 Vitals BP Pulse Temp Resp Height(growth percentile) Weight(growth percentile) 130/54 (BP 1 Location: Right arm, BP Patient Position: Sitting) 90 96.7 °F (35.9 °C) (Oral) 16 4' 8\" (1.422 m) 103 lb 6.4 oz (46.9 kg) SpO2 BMI OB Status Smoking Status 97% 23.18 kg/m2 Postmenopausal Never Smoker Vitals History BMI and BSA Data Body Mass Index Body Surface Area  
 23.18 kg/m 2 1.36 m 2 Preferred Pharmacy Pharmacy Name Phone [id number] Thais Dempsey, [address] [place] [address] [phone] Your Updated Medication List  
  
   
This list is accurate as of 10/5/18  1:36 PM.  Always use your most recent med list.  
  
  
  
  
 carbidopa-levodopa  mg rapid dissolve tablet Commonly known as:  PARCOPA Take 1 Tab by mouth three (3) times daily. donepezil 5 mg Tbdi Take 5 mg by mouth nightly. LORazepam 2 mg/mL concentrated solution Commonly known as:  INTENSOL Take 0.5 mL by mouth every eight (8) hours as needed. Max Daily Amount: 3 mg. For anxiety, agitation QUEtiapine 25 mg tablet Commonly known as:  SEROquel Take 1 Tab by mouth nightly as needed. For sleep. Follow-up Instructions Return in about 3 months (around 1/5/2019). Introducing [facility]! Morro Cardona introduces [employer] patient portal. Now you can access parts of your medical record, email your doctor's office, and request medication refills online. 1. In your internet browser, go to [email or web address] Mi [email or web address] [email or web address] 2. Click on the First Time User? Click Here link in the Sign In box. You will see the New Member Sign Up page. 3. Enter your [employer] Access Code exactly as it appears below. You will not need to use this code after youve completed the sign-up process. If you do not sign up before the expiration date, you must request a new code. · [employer] Access Code: [email or web address] Expires: 12/20/2018  2:36 PM 
 
4. Enter the last four digits of your Social Security Number (xxxx) and Date of Birth (mm/dd/yyyy) as indicated and click Submit. You will be taken to the next sign-up page. 5. Create a [employer] ID.  This will be your [employer] login ID and cannot be changed, so think of one that is secure and easy to remember. 6. Create a [employer] password. You can change your password at any time. 7. Enter your Password Reset Question and Answer. This can be used at a later time if you forget your password. 8. Enter your e-mail address. You will receive e-mail notification when new information is available in [address] 9. Click Sign Up. You can now view and download portions of your medical record. 10. Click the Download Summary menu link to download a portable copy of your medical information. If you have questions, please visit the Frequently Asked Questions section of the [employer] website. Remember, [employer] is NOT to be used for urgent needs. For medical emergencies, dial 911. Now available from your iPhone and Android! Please provide this summary of care documentation to your next provider. Your primary care clinician is listed as Herb Taylor. If you have any questions after today's visit, please call [phone].

## 2018-10-18 NOTE — PATIENT INSTRUCTIONS
- continue to keep daytime home bright, with curtains open. - continue to try to give seroquel liquid (quetiapine) at night. Follow-up with Dr. Samson Ontiveros (psychiatrist) as scheduled in January). Please see if you can be seen with neurologist to help verify diagnosis of 
 
 
  
Helping A Person With Dementia: Care Instructions Your Care Instructions Dementia is a loss of mental skills that affects daily life. It is different from mild memory loss that occurs with aging. Dementia can cause problems with memory, thinking clearly, and planning. It is different for everyone. But it usually gets worse slowly. Some people who have dementia can function well for a long time. But at some point it may become hard for the person to care for himself or herself. It can be upsetting to learn that a loved one has this condition. You may be afraid and worried about what will happen. You may wonder how you will care for the person. There is no cure for dementia. But medicine may be able to slow memory loss and improve thinking for a while. Other medicines may help with sleep, depression, and behavior changes. Dementia is different for everyone. In some cases, people can function well for a long time. You can help your loved one by making his or her home life easier and safer. You also need to take care of yourself. Caregiving can be stressful. But support is available to help you and give you a break when you need it. The Alzheimer's Association offers good information and support. If you are caring for someone with dementia, you can help make life safer and more comfortable. You can also help your loved one make decisions about future care. You may also want to bring up legal and financial issues. These are hard but important conversations to have. Follow-up care is a key part of your loved one's treatment and safety.  Be sure to make and go to all appointments, and call your doctor if your loved one is having problems. It's also a good idea to know your loved one's test results and keep a list of the medicines he or she takes. How can you care for your loved one at home? Taking care of the person · If the person takes medicine for dementia, help him or her take it exactly as prescribed. Call the doctor if you notice any problems with the medicine. · Make a list of the person's medicines. Review it with all of his or her doctors. · Help the person eat a balanced diet. Serve plenty of whole grains, fruits, and vegetables every day. If the person is not hungry at mealtimes, give snacks at midmorning and in the afternoon. Offer drinks such as Boost, Ensure, or Sustacal if the person is losing weight. · Encourage exercise. Walking and other activities may slow the decline of mental ability. Help the person stay active mentally with reading, crossword puzzles, or other hobbies. · Take steps to help if the person is sundowning. This is the restless behavior and trouble with sleeping that may occur in late afternoon and at night. Try not to let the person nap during the day. Offer a glass of warm milk or caffeine-free tea before bedtime. · Develop a routine. Your loved one will feel less frustrated or confused with a clear, simple plan of what to do every day. · Be patient. A task may take the person longer than it used to. · For as long as he or she is able, allow your loved one to make decisions about activities, food, clothing, and other choices. Let him or her be independent, even if tasks take more time or are not done perfectly. Tailor tasks to the person's abilities. For example, if cooking is no longer safe, ask for other help. Your loved one can help set the table, or make simple dishes such as a salad. When the person needs help, offer it gently. Staying safe · Make your home (or your loved one's home) safe.  Tack down rugs, and put no-slip tape in the tub. Install handrails, and put safety switches on stoves and appliances. Keep rooms free of clutter. Make sure walkways around furniture are clear. Do not move furniture around, because the person may become confused. · Use locks on doors and cupboards. Lock up knives, scissors, medicines, cleaning supplies, and other dangerous things. · Do not let the person drive or cook if he or she can't do it safely. A person with dementia should not drive unless he or she is able to pass an on-road driving test. Your state 's license bureau can do a driving test if there is any question. · Get medical alert jewelry for the person so that you can be contacted if he or she wanders away. If possible, provide a safe place for wandering, such as an enclosed yard or garden. Taking care of yourself · Ask your doctor about support groups and other resources in your area. · Take care of your health. Be sure to eat healthy foods and get enough rest and exercise. · Take time for yourself. Respite services provide someone to stay with the person for a short time while you get out of the house for a few hours. · Make time for an activity that you enjoy. Read, listen to music, paint, do crafts, or play an instrument, even if it's only for a few minutes a day. · Spend time with family, friends, and others in your support system. When should you call for help? Call 911 anytime you think the person may need emergency care. For example, call if: 
  · The person who has dementia wanders away and you can't find him or her.  
  · The person who has dementia is seriously injured.  
 Call the doctor now or seek immediate medical care if: 
  · The person suddenly sees things that are not there (hallucinates).  
  · The person has a sudden change in his or her behavior.  
 Watch closely for changes in the person's health, and be sure to contact the doctor if: 
  · The person has symptoms that could cause injury.   · The person has problems with his or her medicine.  
  · You need more information to care for a person with dementia.  
  · You need respite care so you can take a break. Where can you learn more? Go to http://jose juan-juancarlos.info/. Enter A503 in the search box to learn more about \"Helping A Person With Dementia: Care Instructions. \" Current as of: December 7, 2017 Content Version: 11.8 © 3141-8495 Healthwise[employer]orporated. Care instructions adapted under license by [employer] (which disclaims liability or warranty for this information). If you have questions about a medical condition or this instruction, always ask your healthcare professional. [place] [address] any warranty or liability for your use of this information. 100

## 2018-12-14 DIAGNOSIS — G47.01 INSOMNIA DUE TO MEDICAL CONDITION: ICD-10-CM

## 2018-12-14 RX ORDER — QUETIAPINE FUMARATE 25 MG/1
TABLET, FILM COATED ORAL
Qty: 30 TAB | Refills: 0 | OUTPATIENT
Start: 2018-12-14

## 2018-12-14 RX ORDER — QUETIAPINE FUMARATE 25 MG/1
25 TABLET, FILM COATED ORAL
Qty: 90 TAB | Refills: 1 | Status: SHIPPED | OUTPATIENT
Start: 2018-12-14 | End: 2019-01-08 | Stop reason: SDUPTHER

## 2018-12-14 NOTE — TELEPHONE ENCOUNTER
Last written here as 30-day supply quetiapine tabs on 9-21-18. Future Appointments   Date Time Provider Kyler Dsouza   1/8/2019  3:00 PM Dewayne Lozano MD [facility]   1/11/2019  2:40 PM Jennifer, [address] [place] [address] K, DO C/ [address]     Refill request routed to PCP to review. She was also seen by Dr. Usman Carranza with neurology, at which time medication was recommended, but not refilled.

## 2018-12-18 ENCOUNTER — TELEPHONE (OUTPATIENT)
Dept: INTERNAL MEDICINE CLINIC | Age: 73
End: 2018-12-18

## 2018-12-18 ENCOUNTER — TELEPHONE (OUTPATIENT)
Dept: NEUROLOGY | Age: 73
End: 2018-12-18

## 2018-12-18 NOTE — TELEPHONE ENCOUNTER
Marielena brown NP w/ [employer] was doing a home visit for the pt w/ the daughter present. During the appt the NP was listening to pts heart and had some questions in regards to the pts diagnosis and bp as well as questions in regards to medications the pt has/is taking.  The daughter  Gershon Kawasaki would like a call back from a nurse from her mothers pcp office.   [phone]

## 2018-12-18 NOTE — TELEPHONE ENCOUNTER
Valentina Garcia calling from a at home visit with the pt and needs a little more info from a nurse about pt's diagnosis.  Please call back

## 2018-12-19 NOTE — TELEPHONE ENCOUNTER
Found phone number for [facility] NP on other phone message and called Stefani ([phone]). Reporting that she would recommend the following home supplies:    - hospital bed    - walker and wheelchair    Also recommending daughter call to see if home health aid is covered via insurance. Elevated heartrate was reported to her from daughter, but in home measured 105 after she had been walking around, regular, not concerning for arrhythmia. Please call Susana Edmondson (daughter) and ask   1) would she like us to request home medical equipment including: hospital bed, walker, wheelchair? 2) clarify daughters concern about heart rate. Likely we need to schedule appointment if she is measuring high heart rates.  (more than 120bpm)    Thanks  Geronimo Fragoso MD

## 2018-12-19 NOTE — TELEPHONE ENCOUNTER
Spoke with Osman Mccall and based on what she is asking, advised she call Dr Mickey Gil who prescribes her meds. She stated she will do so.

## 2018-12-20 NOTE — TELEPHONE ENCOUNTER
Spoke with pt daughter, after verifying pt name and . Advised daughter to contact pt insurance company to see if they would cover a hospital bed, walker and wheelchair . Daughter stated she would contact them and let us know so we can send in an order for [facility] supplies. Daughter stated that her Mom really doesn't speak much so unsure if she is still having increased heart rate. Pt is scheduled to be seen for increased HR 19 at 4 pm . Answered any and all questions daughter had. Daughter had understanding.

## 2019-01-07 ENCOUNTER — OFFICE VISIT (OUTPATIENT)
Dept: INTERNAL MEDICINE CLINIC | Age: 74
End: 2019-01-07

## 2019-01-07 VITALS
DIASTOLIC BLOOD PRESSURE: 81 MMHG | HEIGHT: 56 IN | TEMPERATURE: 98.1 F | SYSTOLIC BLOOD PRESSURE: 163 MMHG | RESPIRATION RATE: 18 BRPM | OXYGEN SATURATION: 99 % | HEART RATE: 90 BPM | BODY MASS INDEX: 26.32 KG/M2 | WEIGHT: 117 LBS

## 2019-01-07 DIAGNOSIS — N39.0 RECURRENT UTI: ICD-10-CM

## 2019-01-07 DIAGNOSIS — R00.0 TACHYCARDIA: ICD-10-CM

## 2019-01-07 DIAGNOSIS — G31.83 LEWY BODY DEMENTIA WITH BEHAVIORAL DISTURBANCE (HCC): ICD-10-CM

## 2019-01-07 DIAGNOSIS — R26.89 SHUFFLING GAIT: ICD-10-CM

## 2019-01-07 DIAGNOSIS — R60.0 LOWER EXTREMITY EDEMA: ICD-10-CM

## 2019-01-07 DIAGNOSIS — R32 URINARY INCONTINENCE, UNSPECIFIED TYPE: Primary | ICD-10-CM

## 2019-01-07 DIAGNOSIS — M54.6 CHRONIC THORACIC BACK PAIN, UNSPECIFIED BACK PAIN LATERALITY: ICD-10-CM

## 2019-01-07 DIAGNOSIS — G89.29 CHRONIC THORACIC BACK PAIN, UNSPECIFIED BACK PAIN LATERALITY: ICD-10-CM

## 2019-01-07 DIAGNOSIS — Z87.440 HISTORY OF BLADDER INFECTIONS: ICD-10-CM

## 2019-01-07 DIAGNOSIS — F02.818 LEWY BODY DEMENTIA WITH BEHAVIORAL DISTURBANCE (HCC): ICD-10-CM

## 2019-01-07 PROBLEM — R41.82 ALTERED MENTAL STATUS: Status: RESOLVED | Noted: 2018-02-22 | Resolved: 2019-01-07

## 2019-01-07 PROBLEM — M54.9 BACK PAIN: Status: ACTIVE | Noted: 2019-01-07

## 2019-01-07 LAB
BILIRUB UR QL STRIP: NEGATIVE
GLUCOSE UR-MCNC: NEGATIVE MG/DL
KETONES P FAST UR STRIP-MCNC: NEGATIVE MG/DL
PH UR STRIP: 7 [PH] (ref 4.6–8)
PROT UR QL STRIP: NEGATIVE
SP GR UR STRIP: 1.02 (ref 1–1.03)
UA UROBILINOGEN AMB POC: NORMAL (ref 0.2–1)
URINALYSIS CLARITY POC: CLEAR
URINALYSIS COLOR POC: YELLOW
URINE BLOOD POC: NORMAL
URINE LEUKOCYTES POC: NEGATIVE
URINE NITRITES POC: NEGATIVE

## 2019-01-07 RX ORDER — NITROFURANTOIN 25; 75 MG/1; MG/1
100 CAPSULE ORAL 2 TIMES DAILY
Qty: 10 CAP | Refills: 0 | Status: SHIPPED | OUTPATIENT
Start: 2019-01-07 | End: 2019-01-12

## 2019-01-07 RX ORDER — FUROSEMIDE 20 MG/1
TABLET ORAL
Qty: 30 TAB | Refills: 1 | Status: SHIPPED | OUTPATIENT
Start: 2019-01-07 | End: 2019-07-23

## 2019-01-07 NOTE — LETTER
2019 5:20 PM 
 
Ms. Jeanne Kohler He 
[address] [facility] [id number] New medications today:  
Orders Placed This Encounter  AMB SUPPLY ORDER Hospital bed with rails.  AMB SUPPLY ORDER Please supply with 2 wheeled walker, adjustable height.  CULTURE, URINE  TSH 3RD GENERATION  
 CBC W/O DIFF  
 METABOLIC PANEL, BASIC  
 AMB POC URINALYSIS DIP STICK AUTO W/O MICRO  2D ECHO COMPLETE ADULT (TTE) W OR WO CONTR Standing Status:   Future Standing Expiration Date:   7/10/2019 Order Specific Question:   Reason for Exam: Answer:   evaluate LV and RV function. Order Specific Question:   Contrast Enhancement (Bubble Study, Definity, Optison) may be used if criteria listed in established evidence-based protocol has been identified. Answer: Yes  furosemide (LASIX) 20 mg tablet Si tablet daily. Dispense:  30 Tab Refill:  1  
 nitrofurantoin, macrocrystal-monohydrate, (MACROBID) 100 mg capsule Sig: Take 1 Cap by mouth two (2) times a day for 5 days. Dispense:  10 Cap Refill:  0 Please ask [employer] who is preferred provider for medical supplies. You can fax orders to this company.

## 2019-01-07 NOTE — PROGRESS NOTES
HPI   Donavon Norton He is a 75 y.o. female, she presents today for:    Daughter and sister in law provide history and translate some questions to mother. Dementia makes answers less reliable. Noted to have elevated heart rate by home visiting NP. Daughter notes correlation between elevated heart rate and when when has been taking her Seroquel daily. Also notes high appetite. Even when taking seroquel, may only fall asleep for a matter of 3-4 hours. Taking vitamin D, fish oil, vitamin B12. Also taking chinese herbs. Scheduled to see geriatric psychiatry tomorrow. PMH/PSH: reviewed and updated  Sochx:  reports that  has never smoked. she has never used smokeless tobacco. She reports that she does not drink alcohol or use drugs. Famhx: reviewed and updated     All: No Known Allergies  Med:   Current Outpatient Medications   Medication Sig    QUEtiapine (SEROQUEL) 25 mg tablet Take 1 Tab by mouth nightly.  QUEtiapine (SEROQUEL) 25 mg tablet Take 1 Tab by mouth nightly as needed. For sleep.  LORazepam (INTENSOL) 2 mg/mL concentrated solution Take 0.5 mL by mouth every eight (8) hours as needed. Max Daily Amount: 3 mg. For anxiety, agitation    carbidopa-levodopa (PARCOPA)  mg rapid dissolve tablet Take 1 Tab by mouth three (3) times daily.  donepezil 5 mg TbDi Take 5 mg by mouth nightly. No current facility-administered medications for this visit. Review of Systems   Unable to perform ROS: Dementia     PE:  Blood pressure 163/81, pulse 90, temperature 98.1 °F (36.7 °C), temperature source Oral, resp. rate 18, height 4' 8\" (1.422 m), weight 117 lb (53.1 kg), SpO2 99 %. Body mass index is 26.23 kg/m². Physical Exam   Constitutional: She is oriented to person, place, and time. She appears well-developed and well-nourished. No distress. Pleasant, calm. Smiling intermittently. Responds to daughter. Following directions   HENT:   Head: Normocephalic.    Mouth/Throat: Oropharynx is clear and moist.   Eyes: Conjunctivae and EOM are normal.   Neck: Neck supple. Cardiovascular: Normal rate, regular rhythm and normal heart sounds. Pulmonary/Chest: Effort normal and breath sounds normal.   Abdominal: Soft. She exhibits no distension. There is no tenderness. Musculoskeletal: She exhibits no edema. Lymphadenopathy:     She has no cervical adenopathy. Neurological: She is alert and oriented to person, place, and time. Skin: Skin is warm and dry. Capillary refill takes less than 2 seconds. Psychiatric: She has a normal mood and affect. Nursing note and vitals reviewed. Labs:   No results found for any visits on 01/07/19. A/P:  75 y.o. female    ICD-10-CM ICD-9-CM    1. Urinary incontinence, unspecified type R32 788.30 AMB POC URINALYSIS DIP STICK AUTO W/O MICRO   2. Lower extremity edema R60.0 782.3 furosemide (LASIX) 20 mg tablet      METABOLIC PANEL, BASIC      2D ECHO COMPLETE ADULT (TTE) W OR WO CONTR   3. Tachycardia R00.0 785.0 TSH 3RD GENERATION      CBC W/O DIFF      METABOLIC PANEL, BASIC   4. Lewy body dementia with behavioral disturbance G31.83 331.82 AMB SUPPLY ORDER    F02.81 294.11 AMB SUPPLY ORDER   5. Shuffling gait R26.89 781.2 AMB SUPPLY ORDER      AMB SUPPLY ORDER   6. History of bladder infections Z87.440 V13.02    7. Chronic thoracic back pain, unspecified back pain laterality M54.6 724.1 AMB SUPPLY ORDER    G89.29 338.29 AMB SUPPLY ORDER   8. Recurrent UTI N39.0 599.0 CULTURE, URINE      nitrofurantoin, macrocrystal-monohydrate, (MACROBID) 100 mg capsule     UTI, recurrent: mild hematuria today, send for urine culture. Treat with nitrofurantoin. LE edema: also does not lie flat to sleep (daughter believes more back pain relate). Echocardiogram to evaluate function requested. Trial low dose furosemide. Tachycardia, mild in office with HR 90 and regular tachycardia reported by home visiting nurse. Screen for anemia, thyroid disorder. EKG wnl in May. Dementia with behavior disturbance, hallucination, and sleep disturbance: appreciate assistance of Dr. Susie Gray. Encouraged daughter to be open to trying new medication to help with sleep. To also follow-up with geriatric psychology. - She was given AVS and expressed understanding with the diagnosis and plan as discussed.   Follow-up Disposition: Not on File  Future Appointments   Date Time Provider Kyler Dsouza   1/8/2019  3:00 PM Ajay Santillan  [facility]   1/11/2019  2:40 PM Lovely Rodriguez [facility] 30,  [facility]

## 2019-01-07 NOTE — PROGRESS NOTES
Chief Complaint   Patient presents with    Dementia     follow up    Irregular Heart Beat     tachycardia about two weeks ago    Urinary Retention     leaking urine

## 2019-01-08 ENCOUNTER — OFFICE VISIT (OUTPATIENT)
Dept: BEHAVIORAL/MENTAL HEALTH CLINIC | Age: 74
End: 2019-01-08

## 2019-01-08 VITALS
BODY MASS INDEX: 25.64 KG/M2 | HEIGHT: 56 IN | DIASTOLIC BLOOD PRESSURE: 66 MMHG | SYSTOLIC BLOOD PRESSURE: 140 MMHG | WEIGHT: 114 LBS | HEART RATE: 81 BPM

## 2019-01-08 DIAGNOSIS — F02.818 FRONTOTEMPORAL DEMENTIA WITH BEHAVIORAL DISTURBANCE (HCC): Primary | ICD-10-CM

## 2019-01-08 DIAGNOSIS — G47.01 INSOMNIA DUE TO MEDICAL CONDITION: ICD-10-CM

## 2019-01-08 DIAGNOSIS — G31.09 FRONTOTEMPORAL DEMENTIA WITH BEHAVIORAL DISTURBANCE (HCC): Primary | ICD-10-CM

## 2019-01-08 PROBLEM — N39.0 RECURRENT UTI (URINARY TRACT INFECTION): Status: ACTIVE | Noted: 2019-01-08

## 2019-01-08 RX ORDER — QUETIAPINE FUMARATE 50 MG/1
50 TABLET, FILM COATED ORAL
Qty: 30 TAB | Refills: 1 | Status: SHIPPED | OUTPATIENT
Start: 2019-01-08 | End: 2019-01-31 | Stop reason: SDUPTHER

## 2019-01-08 RX ORDER — DIVALPROEX SODIUM 125 MG/1
125 TABLET, DELAYED RELEASE ORAL 3 TIMES DAILY
Qty: 60 TAB | Refills: 1 | Status: SHIPPED | OUTPATIENT
Start: 2019-01-08 | End: 2019-01-08 | Stop reason: SDUPTHER

## 2019-01-08 RX ORDER — DIVALPROEX SODIUM 125 MG/1
125 TABLET, DELAYED RELEASE ORAL 3 TIMES DAILY
Qty: 90 TAB | Refills: 1 | Status: SHIPPED | OUTPATIENT
Start: 2019-01-08 | End: 2019-03-07 | Stop reason: SDUPTHER

## 2019-01-08 RX ORDER — BISMUTH SUBSALICYLATE 262 MG
1 TABLET,CHEWABLE ORAL DAILY
COMMUNITY
End: 2020-04-28

## 2019-01-08 NOTE — PROGRESS NOTES
Ambulatory Initial Psychiatric Evaluation     Chief Complaint:   Chief Complaint   Patient presents with    New Patient        History of Present Illness: Stephanie Bird He is a 75 y.o. , [facility]  female who presents with history of dementia, recurrent UTI, lower extremity edema, questionable diagnosis of Parkinson's disease, was seen today to establish her mental health treatment here. Patient was brought by her  and her daughter. Patient was recently seen by Dr. Dong Cortes, neurologist in October 2018 and was diagnosed with Lewey Body Dementia based on her history of Parkinson's disease, which was never fully investigated. Most of the history was given by her daughter as patient is speaks very little [place]. Daughter reported that patient is starting to have some problem with her memory around 2016 which was not very significant. Slowly sleep started to get disturbed and significant mood fluctuation with aggressive behaviors. She was admitted to inpatient psychiatry at [address] [place] [address] in April 2017 for 11 days and was discharged on the combination of Namenda and Haldol. [facility] [address] [facility]. In the program patient was seen by psych nurse practitioner Jayden Santiago for few years, and was  tried her on many psych medications with little benefits. In last few years patient has been tried on Aricept, which caused her diarrhea and loss of appetite, Namenda, which was not fully titrated and was stopped. She was also tried on Zyprexa, Haldol, Risperdal, trazodone and lorazepam for variety of symptoms. At one time she was tried on Sinemet for PD, which she could not tolerate. Daughter does not know the details of why these medications were restarted or stopped. Daughter reported that  patient's biggest problem is not sleeping well and wandering at night.   She also has significant mood fluctuation from being very happy to be extremely irritable and physically aggressive towards her  and caregivers. She has been hitting and scratching her  frequently in the evening. Patient does not yell but continues to mumble. She is eating well and has put on some weight. Daughter was not clear whether patient has visual hallucinations or delusional thinking. Patient does not have any significant falls but does have urinary incontinence for long time. She has recurrent UTIs and has been admitted multiple times with symptoms of delirium. Most of her visual experiences has been around the UTI episodes. Patient is currently on low-dose of Seroquel, which is not making her sleep more than 2-3 hours per night. Patient is redirectable. I saw the patient in the presence of her  and daughter. She was very bright and laughed inappropriately. She answered my questions, when it was translated by her daughter. She is alert and oriented to time, place and person. According to the daughter patient does not have significant memory problems but most of her difficulties are  her abrupt behavior changes. Later on patient was able to participate in brief cognitive testing and did reasonably well. Patient only has high school education from [place]. Patient never had any behavioral issues before 2016 and does not have any history of mental illness. Patient has multiple medical problems including urinary incontinence, recurrent UTI, back pain, lower extremity edema and questionable diagnosis of Parkinson's disease. Though there is a diagnosis of shuffling gait, she walked perfectly fine with me. MRI of head done on 2/24/2018 showed only mild diffuse cerebral atrophy. Scales:    [facility]: 14/28 - [facility]    Past Psychiatric History:   As per HPI, patient does not have any history of mental illness prior to 2016. She had 1 psychiatric hospitalization at [facility] in 2017.   Patient does not have any psychiatric follow-up in the community at this time.       Past history of substance use: There is no history of substance use disorder    Social History:   Patient has been  for 50+ years. She has 1 son and 1 daughter. She currently lives with her daughter and her . She high school education from [place]. She came to [place] in 1993 with her family. 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 There is no history of childhood abuse. There is no history of legal problems. Family History: Mother has history of dementia. Past Medical History:   Past Medical History:   Diagnosis Date    Dementia     History of bladder infections     Osteopenia          Allergies:   No Known Allergies     Medication List:   Current Outpatient Medications   Medication Sig Dispense Refill    multivitamin (ONE A DAY) tablet Take 1 Tab by mouth daily.  QUEtiapine (SEROQUEL) 50 mg tablet Take 1 Tab by mouth nightly. For sleep. 30 Tab 1    divalproex DR (DEPAKOTE) 125 mg tablet Take 1 Tab by mouth three (3) times daily. 60 Tab 1    furosemide (LASIX) 20 mg tablet 1 tablet daily. 30 Tab 1    nitrofurantoin, macrocrystal-monohydrate, (MACROBID) 100 mg capsule Take 1 Cap by mouth two (2) times a day for 5 days.  10 Cap 0        ROS:  Constitutional: positive for fatigue and weight gain  Eyes: positive for contacts/glasses  Ears, nose, mouth, throat, and face: positive for hearing loss  Respiratory: negative for cough or wheezing  Cardiovascular: negative for chest pain, palpitations  Gastrointestinal: negative for reflux symptoms and constipation  Genitourinary:positive for frequency and urinary incontinence  Musculoskeletal:positive for arthralgias  Neurological: positive for memory problems  Behavioral/Psych: positive for aggressive behavior, behavior problems and sleep disturbance, negative for SI or HI    Psychiatric/Mental Status Examination:     MENTAL STATUS EXAM:  Sensorium  oriented to time, place and person   Orientation person, place and time/date   Relations cooperative and passive   Eye Contact appropriate   Appearance:  age appropriate and casually dressed   Motor Behavior:  within normal limits   Speech:  normal pitch and normal volume   Vocabulary average   Thought Process: circumstantial and tangential   Thought Content delusions and confabulation   Suicidal ideations none   Homicidal ideations none   Mood:  euphoric   Affect:  full range   Memory recent  impaired   Memory remote:  adequate   Concentration:  impaired   Abstraction:  concrete   Insight:  poor   Reliability poor   Judgment:  poor       Assessement & Diagnoses: This is a 78-year-old,  [facility] female, with history of dementia was seen today with significant behavioral disturbances. Her current symptomatology fits with frontotemporal dementia with poor sleep, aggressive behavior, significant mood fluctuation and mild cognitive impairment. Patient continues to have some visual hallucinations during episodes of recurrent UTIs. Today patient was hypomanic and showed mild cognitive impairment. Primary diagnosis: Frontotemporal dementia with behavioral disturbances, insomnia    Secondary diagnosis: No personality disorder noted     Tertiary diagnosis: Multiple medical problems as noted above    Strength & Weaknesses: Cooperative and pleasant, supportive family, good job history    Treatment Plan:   1. Medication: increasing Seroquel to 50 mg at bedtime, start Depakote 125 mg 3 times a day  2. Discussed: the potential medication side effects  dry mouth, GI disturbance, somnolence, tremor  patient given opportunity to ask questions  3. Psychotherapy: No psychotherapy recommended  4. Medical: Continue with PCP  5. Education: Patient's daughter and  were educated on my clinical impression, treatment and prognosis. 6. Return to Clinic: Follow-up Disposition:  Return in about 2 months (around 3/8/2019).      The risk versus benefits of treatment were discussed and side effects explained. Patient/family agreed with plan. Patient instructed to call with any side effects.      Time spent with Patient:  30 to 74 minutes    Clementina Monroe MD  1/8/2019

## 2019-01-09 ENCOUNTER — TELEPHONE (OUTPATIENT)
Dept: INTERNAL MEDICINE CLINIC | Age: 74
End: 2019-01-09

## 2019-01-09 DIAGNOSIS — R31.9 HEMATURIA, UNSPECIFIED TYPE: Primary | ICD-10-CM

## 2019-01-09 LAB
BACTERIA UR CULT: NORMAL
BUN SERPL-MCNC: 19 MG/DL (ref 8–27)
BUN/CREAT SERPL: 22 (ref 12–28)
CALCIUM SERPL-MCNC: 9.8 MG/DL (ref 8.7–10.3)
CHLORIDE SERPL-SCNC: 102 MMOL/L (ref 96–106)
CO2 SERPL-SCNC: 24 MMOL/L (ref 20–29)
CREAT SERPL-MCNC: 0.85 MG/DL (ref 0.57–1)
ERYTHROCYTE [DISTWIDTH] IN BLOOD BY AUTOMATED COUNT: 13.3 % (ref 12.3–15.4)
GLUCOSE SERPL-MCNC: 87 MG/DL (ref 65–99)
HCT VFR BLD AUTO: 36.3 % (ref 34–46.6)
HGB BLD-MCNC: 12 G/DL (ref 11.1–15.9)
MCH RBC QN AUTO: 30.6 PG (ref 26.6–33)
MCHC RBC AUTO-ENTMCNC: 33.1 G/DL (ref 31.5–35.7)
MCV RBC AUTO: 93 FL (ref 79–97)
PLATELET # BLD AUTO: 284 X10E3/UL (ref 150–379)
POTASSIUM SERPL-SCNC: 4.1 MMOL/L (ref 3.5–5.2)
RBC # BLD AUTO: 3.92 X10E6/UL (ref 3.77–5.28)
SODIUM SERPL-SCNC: 140 MMOL/L (ref 134–144)
TSH SERPL DL<=0.005 MIU/L-ACNC: 1.83 UIU/ML (ref 0.45–4.5)
WBC # BLD AUTO: 6.4 X10E3/UL (ref 3.4–10.8)

## 2019-01-09 NOTE — TELEPHONE ENCOUNTER
Spoke with pt daughter( on HIPPA) after verifying pt name and . Went over test results and recommendations . Answered any and all questions daughter had. Daughter will bring pt in after completing abx. Daughter verbalized understanding.

## 2019-01-09 NOTE — TELEPHONE ENCOUNTER
Labs obtained to evaluate increased heartrate noted. No anemia, normal thyroid function, electrolytes all in normal range. Urine culture does not show clear infection. Consider other cause for blood in urine. Please call and advise to complete a urine test after completing antibiotics. orders placed.      Lana Garcia MD

## 2019-01-09 NOTE — PROGRESS NOTES
Labs obtained to evaluate increased heartrate noted. No anemia, normal thyroid function, electrolytes all in normal range. Urine culture does not show clear infection. Consider other cause for blood in urine. Will have repeat urinalysis requested with microscopic exam (see phone note).    Wero Floyd MD

## 2019-01-09 NOTE — TELEPHONE ENCOUNTER
----- Message from Kanika Maldonado sent at 1/9/2019  1:06 PM EST -----  Regarding: Dr Dayron Hardy is returning doctors or nurse call from today, please call pt back at [phone]

## 2019-01-15 ENCOUNTER — HOSPITAL ENCOUNTER (OUTPATIENT)
Dept: NON INVASIVE DIAGNOSTICS | Age: 74
Discharge: HOME | End: 2019-01-15
Attending: INTERNAL MEDICINE
Payer: MEDICARE

## 2019-01-15 ENCOUNTER — TELEPHONE (OUTPATIENT)
Dept: INTERNAL MEDICINE CLINIC | Age: 74
End: 2019-01-15

## 2019-01-15 DIAGNOSIS — R60.0 LOWER EXTREMITY EDEMA: ICD-10-CM

## 2019-01-15 PROCEDURE — 93306 TTE W/DOPPLER COMPLETE: CPT

## 2019-01-15 NOTE — TELEPHONE ENCOUNTER
Please call and advise, overall heart ultrasound (echocardiogram) shows good function. No significiant abnormality. Is the leg swelling improved since starting the lasix? Plan to recheck metabolic profile and albumin level when returning. (as well as urine test).      Thanks  Genet Mckinley MD

## 2019-01-18 NOTE — TELEPHONE ENCOUNTER
Spoke with pt daughter, after verifying pt name and . Went over echo results and recommendations. Per daughter, leg swelling has improved some. Daughter did state that she is unsure if pt takes lasix daily due to her dementia. Daughter will be coming by to get a specimen cup to obtain a UA  . Answered any and all questions daughter had. . Daughter verbalized understanding.

## 2019-01-23 LAB
APPEARANCE UR: CLEAR
BILIRUB UR QL STRIP: NEGATIVE
COLOR UR: YELLOW
GLUCOSE UR QL: NEGATIVE
HGB UR QL STRIP: NEGATIVE
KETONES UR QL STRIP: NEGATIVE
LEUKOCYTE ESTERASE UR QL STRIP: NEGATIVE
MICRO URNS: NORMAL
NITRITE UR QL STRIP: NEGATIVE
PH UR STRIP: 7 [PH] (ref 5–7.5)
PROT UR QL STRIP: NEGATIVE
SP GR UR: 1.01 (ref 1–1.03)
UROBILINOGEN UR STRIP-MCNC: 0.2 MG/DL (ref 0.2–1)

## 2019-01-31 DIAGNOSIS — G47.01 INSOMNIA DUE TO MEDICAL CONDITION: ICD-10-CM

## 2019-01-31 RX ORDER — QUETIAPINE FUMARATE 50 MG/1
50 TABLET, FILM COATED ORAL
Qty: 90 TAB | Refills: 0 | Status: SHIPPED | OUTPATIENT
Start: 2019-01-31 | End: 2019-03-08 | Stop reason: SDUPTHER

## 2019-02-14 ENCOUNTER — DOCUMENTATION ONLY (OUTPATIENT)
Dept: INTERNAL MEDICINE CLINIC | Age: 74
End: 2019-02-14

## 2019-02-15 ENCOUNTER — DOCUMENTATION ONLY (OUTPATIENT)
Dept: INTERNAL MEDICINE CLINIC | Age: 74
End: 2019-02-15

## 2019-02-28 ENCOUNTER — CLINICAL SUPPORT (OUTPATIENT)
Dept: INTERNAL MEDICINE CLINIC | Age: 74
End: 2019-02-28

## 2019-02-28 DIAGNOSIS — R32 URINARY INCONTINENCE IN FEMALE: Primary | ICD-10-CM

## 2019-02-28 LAB
BILIRUB UR QL STRIP: NEGATIVE
GLUCOSE UR-MCNC: NEGATIVE MG/DL
KETONES P FAST UR STRIP-MCNC: NEGATIVE MG/DL
PH UR STRIP: 6 [PH] (ref 4.6–8)
PROT UR QL STRIP: NEGATIVE
SP GR UR STRIP: 1.02 (ref 1–1.03)
UA UROBILINOGEN AMB POC: ABNORMAL (ref 0.2–1)
URINALYSIS CLARITY POC: CLEAR
URINALYSIS COLOR POC: YELLOW
URINE BLOOD POC: ABNORMAL
URINE LEUKOCYTES POC: ABNORMAL
URINE NITRITES POC: NEGATIVE

## 2019-02-28 NOTE — PROGRESS NOTES
\"pinky\" pts daughter dropped off urine sample because she thought pt may have a UTI. She states that pt has had increased urinary incontinence. Slight increase in confusion. No c/o pain OR other symptoms. Notified daughter that we would send urine culture to lab and notify her when result is back. Increase fluid intake in the mean time.   Daughter voiced understanding

## 2019-03-02 LAB — BACTERIA UR CULT: NO GROWTH

## 2019-03-07 RX ORDER — DIVALPROEX SODIUM 125 MG/1
125 TABLET, DELAYED RELEASE ORAL 3 TIMES DAILY
Qty: 90 TAB | Refills: 1 | Status: SHIPPED | OUTPATIENT
Start: 2019-03-07 | End: 2019-07-23

## 2019-03-21 ENCOUNTER — OFFICE VISIT (OUTPATIENT)
Dept: BEHAVIORAL/MENTAL HEALTH CLINIC | Age: 74
End: 2019-03-21

## 2019-03-21 VITALS
BODY MASS INDEX: 27.22 KG/M2 | HEART RATE: 100 BPM | SYSTOLIC BLOOD PRESSURE: 159 MMHG | WEIGHT: 121 LBS | DIASTOLIC BLOOD PRESSURE: 71 MMHG | HEIGHT: 56 IN

## 2019-03-21 DIAGNOSIS — G47.01 INSOMNIA DUE TO MEDICAL CONDITION: ICD-10-CM

## 2019-03-21 DIAGNOSIS — F02.818 FRONTOTEMPORAL DEMENTIA WITH BEHAVIORAL DISTURBANCE (HCC): Primary | ICD-10-CM

## 2019-03-21 DIAGNOSIS — G31.09 FRONTOTEMPORAL DEMENTIA WITH BEHAVIORAL DISTURBANCE (HCC): Primary | ICD-10-CM

## 2019-03-21 NOTE — PROGRESS NOTES
Psychiatric Outpatient Progress Note    Account Number:  [id number]  Name: Iram Hopper    SUBJECTIVE:   CHIEF COMPLAINT:  Iram Hopper is a 75 y.o. , [facility] female and was seen today for her first follow-up of psychiatric condition and psychotropic medication management. HPI:    Aziza Gruber reports the following psychiatric symptoms:  agitation, anxiety and FTD. The symptoms have been present for years and are of high severity. The symptoms occur daily. Patient was seen in the presence of her  and daughter. She was not able to ambulate without significant assistance and is taking only very small steps. Her affect is flat. According to the daughter, patient has declined significantly in her ambulation and is eating a lot with weight gain. She is eating sweets more than anything. Her behavior has calmed down and she is not as agitated as before. She is sleeping 3-4 hours at night. He is increasingly incontinent of bladder and is not aware of it. Patient requires significantly more assistance with her ADLs. She is more confused and it appears like her cognition has declined significantly. Patient is also very resistive of taking medications and daughter has only been able to give her 1 tablet of Depakote at night in combination with Seroquel. Patient has gained about 7 pounds and his current BMI is 27. Her blood pressure and heart rate is within normal limits. Patient was almost aphasic and did not speak for. Today, patient appears to have more Sx of PD ? Initial Assessement & Diagnoses(1/8/19): This is a 65-year-old,  [facility] female, with history of dementia was seen today with significant behavioral disturbances. Her current symptomatology fits with frontotemporal dementia with poor sleep, aggressive behavior, significant mood fluctuation and mild cognitive impairment. Patient continues to have some visual hallucinations during episodes of recurrent UTIs.   Today patient was hypomanic and showed mild cognitive impairment. Primary diagnosis: Frontotemporal dementia with behavioral disturbances, insomnia  Secondary diagnosis: No personality disorder noted   Tertiary diagnosis: Multiple medical problems as noted above  Strength & Weaknesses: Cooperative and pleasant, supportive family, good job history  Treatment Plan: Medication: increasing Seroquel to 50 mg at bedtime, start Depakote 125 mg 3 times a day    Contributing factors include: progressive dementia     Patient denies SI/HI/SIB. Side Effects:  weight gain, somnolence, tremor      Fam/Soc Hx (from [facility] with updates):   Patient has been  for 50+ years. She has 1 son and 1 daughter. She currently lives with her daughter and her . She high school education from [place]. She came to [place] in 1993 with her family. 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 There is no history of childhood abuse. There is no history of legal problems.        REVIEW OF SYSTEMS:  Constitutional: positive for fatigue and weight gain  Eyes: positive for contacts/glasses  Ears, nose, mouth, throat, and face: positive for hearing loss  Respiratory: negative for cough or wheezing  Cardiovascular: negative for chest pain, palpitations  Gastrointestinal: negative for reflux symptoms and constipation  Genitourinary:positive for frequency and urinary incontinence  Musculoskeletal:positive for arthralgias  Neurological: positive for severe gait instability,  memory problems  Behavioral/Psych: positive for aggressive behavior, behavior problems and sleep disturbance, negative for SI or HI    Scales: (1/8/19)  [facility]: 14/28 - [facility]      Visit Vitals  /71   Pulse 100   Ht 4' 8\" (1.422 m)   Wt 54.9 kg (121 lb)   BMI 27.13 kg/m²       OBJECTIVE:                 Mental Status exam: WNL except for      Sensorium  confused   Relations passive    Eye Contact    poor   Appearance:  age appropriate and casually dressed Motor Behavior/Gait:  hypoactive, gait unsteady and bradykinesis   Speech:  aphasia   Thought Process: disorganized   Thought Content preoccupations   Suicidal ideations none   Homicidal ideations none   Mood:  anxious   Affect:  flat   Memory recent  impaired   Memory remote:  impaired   Concentration:  impaired   Abstraction:  concrete   Insight:  poor   Reliability poor   Judgment:  poor       MEDICAL DECISION MAKING  Data: pertinent labs, imaging, medical records and diagnostic tests reviewed and incorporated in diagnosis and treatment plan    No Known Allergies     Current Outpatient Medications   Medication Sig Dispense Refill    QUEtiapine (SEROQUEL) 50 mg tablet Take 1 Tab by mouth nightly. For sleep. 90 Tab 1    divalproex DR (DEPAKOTE) 125 mg tablet Take 1 Tab by mouth three (3) times daily. 90 Tab 1    multivitamin (ONE A DAY) tablet Take 1 Tab by mouth daily.  furosemide (LASIX) 20 mg tablet 1 tablet daily. 30 Tab 1          Problems addressed today:    ICD-10-CM ICD-9-CM    1. Frontotemporal dementia with behavioral disturbance G31.09 331.19     F02.81 294.11    2. Insomnia due to medical condition G47.01 327.01        Assessment:   Derrell Hopper  is a 75 y.o.  female  Is partially responding to treatment. Symptoms are unstable. Patient denies SI/HI/SIB. No evidence of AH/VH or delusions. Risk Scoring- chronic illnesses and prescription drug management    Treatment Plan:  1. Medications:          Medication Changes/Adjustments: Reduce Depakote, 125 mg, half tablet at bedtime                                                              Decrease Seroquel, 50 mg, half tablet at bedtime                                                              Start melatonin 1 mg, 1 p.o. at bedtime    Current Outpatient Medications   Medication Sig Dispense Refill    QUEtiapine (SEROQUEL) 50 mg tablet Take 1 Tab by mouth nightly. For sleep.  90 Tab 1    divalproex DR (DEPAKOTE) 125 mg tablet Take 1 Tab by mouth three (3) times daily. 90 Tab 1    multivitamin (ONE A DAY) tablet Take 1 Tab by mouth daily.  furosemide (LASIX) 20 mg tablet 1 tablet daily. 30 Tab 1                  The following regarding medications was addressed:    (The risks and benefits of the proposed medication; the potential medication side effects ie    dry mouth, weight gain, GI upset, headache; patient given opportunity to ask questions)       2. Counseling and coordination of care including instructions for treatment, risks/benefits, risk factor reduction and patient/family education. Family agrees with the plan. Daughter instructed to call with any side effects, questions or issues. 3.  Patient's  and daughter were provided supportive counseling for the caregiver stress. PSYCHOTHERAPY:  approx 20 minutes  Type:  Supportive/Solution Focused psychotherapy provided  Focus:     Current problems:   Medical/neurological issues    Psychoeducation provided:  Psych medications. Treatment plan reviewed with patient/family-including diagnosis and medications    Inezin is not progressing. Follow-up and Dispositions    · Return in about 2 months (around 5/21/2019).            Deacon Morrison MD  3/21/2019

## 2019-04-26 ENCOUNTER — OFFICE VISIT (OUTPATIENT)
Dept: INTERNAL MEDICINE CLINIC | Age: 74
End: 2019-04-26

## 2019-04-26 VITALS
SYSTOLIC BLOOD PRESSURE: 145 MMHG | DIASTOLIC BLOOD PRESSURE: 82 MMHG | TEMPERATURE: 98.9 F | OXYGEN SATURATION: 99 % | HEART RATE: 82 BPM | RESPIRATION RATE: 16 BRPM | WEIGHT: 121.8 LBS | HEIGHT: 56 IN | BODY MASS INDEX: 27.4 KG/M2

## 2019-04-26 DIAGNOSIS — N39.0 RECURRENT UTI (URINARY TRACT INFECTION): ICD-10-CM

## 2019-04-26 DIAGNOSIS — R32 URINARY INCONTINENCE, UNSPECIFIED TYPE: Primary | ICD-10-CM

## 2019-04-26 DIAGNOSIS — R31.9 URINARY TRACT INFECTION WITH HEMATURIA, SITE UNSPECIFIED: ICD-10-CM

## 2019-04-26 DIAGNOSIS — R13.12 OROPHARYNGEAL DYSPHAGIA: ICD-10-CM

## 2019-04-26 DIAGNOSIS — R82.90 ABNORMAL URINE: ICD-10-CM

## 2019-04-26 DIAGNOSIS — N39.0 URINARY TRACT INFECTION WITH HEMATURIA, SITE UNSPECIFIED: ICD-10-CM

## 2019-04-26 LAB
BILIRUB UR QL STRIP: NEGATIVE
GLUCOSE UR-MCNC: NEGATIVE MG/DL
KETONES P FAST UR STRIP-MCNC: NEGATIVE MG/DL
PH UR STRIP: 5 [PH] (ref 4.6–8)
PROT UR QL STRIP: NEGATIVE
SP GR UR STRIP: 1.02 (ref 1–1.03)
UA UROBILINOGEN AMB POC: NORMAL (ref 0.2–1)
URINALYSIS CLARITY POC: CLEAR
URINALYSIS COLOR POC: YELLOW
URINE BLOOD POC: NORMAL
URINE LEUKOCYTES POC: NORMAL
URINE NITRITES POC: NEGATIVE

## 2019-04-26 RX ORDER — CEFDINIR 300 MG/1
300 CAPSULE ORAL 2 TIMES DAILY
Qty: 20 CAP | Refills: 0 | Status: SHIPPED | OUTPATIENT
Start: 2019-04-26 | End: 2019-05-06

## 2019-04-26 NOTE — PROGRESS NOTES
HPI: 
Presents for acute care Daughter present - translates per pt preference. C/o leakage and urinary frequency 
+dysuria  
+urgency +hx recurrent UTI Cough with drinking Results in urinary incontinence  
+speech path eval showed aspiration when used straw Past medical, Social, and Family history reviewed Prior to Admission medications Medication Sig Start Date End Date Taking? Authorizing Provider  
cefdinir (OMNICEF) 300 mg capsule Take 1 Cap by mouth two (2) times a day for 10 days. 4/26/19 5/6/19 Yes Kristin Gutierrez MD  
QUEtiapine (SEROQUEL) 50 mg tablet Take 1 Tab by mouth nightly. For sleep. Patient taking differently: Take 50 mg by mouth nightly. For sleep. Indications: should be 25mg per daughter 3/12/19  Yes Edmund Phalen, MD  
divalproex DR (DEPAKOTE) 125 mg tablet Take 1 Tab by mouth three (3) times daily. 3/7/19  Yes Edmund Phalen, MD  
multivitamin (ONE A DAY) tablet Take 1 Tab by mouth daily. Yes Provider, Historical  
furosemide (LASIX) 20 mg tablet 1 tablet daily. 1/7/19   Priti Lowe MD  
  
 
 
ROS Complete ROS reviewed and negative or stable except as noted in HPI. Physical Exam  
Constitutional: She is oriented to person, place, and time. She appears well-nourished. No distress. HENT:  
Head: Normocephalic and atraumatic. Eyes: Pupils are equal, round, and reactive to light. EOM are normal. No scleral icterus. Neck: Normal range of motion. Neck supple. Cardiovascular: Normal rate, regular rhythm and normal heart sounds. Exam reveals no gallop and no friction rub. No murmur heard. Pulmonary/Chest: Effort normal and breath sounds normal. No respiratory distress. She has no wheezes. She has no rales. Abdominal: Soft. Bowel sounds are normal. She exhibits no distension. There is no tenderness. Musculoskeletal: Normal range of motion. She exhibits no edema. Neurological: She is alert and oriented to person, place, and time.  She exhibits normal muscle tone. Skin: Skin is warm. No rash noted. Psychiatric: She has a normal mood and affect. Nursing note and vitals reviewed. Prior labs reviewed. Assessment/Plan: ICD-10-CM ICD-9-CM 1. Urinary incontinence, unspecified type R32 788.30 REFERRAL TO [facility] SURG[facility] 2. Abnormal urine R82.90 791.9 AMB POC URINALYSIS DIP STICK AUTO W/O MICRO  
   CULTURE, URINE  
   URINALYSIS W/ RFLX MICROSCOPIC 3. Recurrent UTI (urinary tract infection) N39.0 599.0 REFERRAL TO FEMALE PELVIC MEDICINE AND RECONSTRUCTIVE SURGERY 4. Oropharyngeal dysphagia R13.12 787.22 XR SWALLOW FUNC VIDEO 5. Urinary tract infection with hematuria, site unspecified N39.0 599.0 cefdinir (OMNICEF) 300 mg capsule R31.9 599.70 Follow-up and Dispositions · Return in about 1 month (around 5/26/2019), or if symptoms worsen or fail to improve, for Medicare Wellness Visit. results and schedule of future studies reviewed with patient, daughter 
reviewed diet and weight  
reviewed medications and side effects in detail Empiric abx - omnicef since quinolone may interact with her meds Ref to urology/urogyn Repeat modified barium swallow - consider speech path referral for therapy

## 2019-04-26 NOTE — PROGRESS NOTES
Rm 13 chinese speaking, daughter states she does not need , phones in room for provider. Chief Complaint Patient presents with  Urinary Burning  
  more frequent, leakage when coughing. all start yesterday per daughter 1. Have you been to the ER, urgent care clinic since your last visit? Hospitalized since your last visit? No 
 
2. Have you seen or consulted any other health care providers outside of the [facility] since your last visit? Include any pap smears or colon screening. No 
 
Health Maintenance Due Topic Date Due  
 DTaP/Tdap/Td series (1 - Tdap) 05/25/1965  Shingrix Vaccine Age 50> (1 of 2) 05/25/1994  
 FOBT Q 1 YEAR AGE 50-75  05/25/1994  GLAUCOMA SCREENING Q2Y  05/25/2009  Pneumococcal 65+ years (2 of 2 - PPSV23) 07/12/2018  MEDICARE YEARLY EXAM  08/01/2018  BREAST CANCER SCRN MAMMOGRAM  07/20/2019 Fall Risk Assessment, last 12 mths 4/26/2019 Able to walk? Yes Fall in past 12 months? Yes Fall with injury? Yes  
Number of falls in past 12 months 8 or more Fall Risk Score 9  
 
 
 
3 most recent PHQ Screens 4/26/2019 PHQ Not Done - Little interest or pleasure in doing things Several days Feeling down, depressed, irritable, or hopeless Not at all Total Score PHQ 2 1 Learning Assessment 9/21/2018 PRIMARY LEARNER Patient BARRIERS PRIMARY LEARNER NONE PRIMARY LANGUAGE CHINESE (MANDARIN) LEARNER PREFERENCE PRIMARY DEMONSTRATION  
ANSWERED BY patient RELATIONSHIP SELF

## 2019-05-01 LAB — BACTERIA UR CULT: ABNORMAL

## 2019-05-02 NOTE — PROGRESS NOTES
Please notify pt of results The bacteria that has grown in her urine will be adequately treated by the chosen antibiotic. Continue current medication to complete the Rx'd course of therapy. Encourage an appt with the urologist if it has not otherwise been made yet.

## 2019-05-21 ENCOUNTER — HOSPITAL ENCOUNTER (OUTPATIENT)
Dept: GENERAL RADIOLOGY | Age: 74
Discharge: HOME | End: 2019-05-21
Attending: INTERNAL MEDICINE
Payer: MEDICARE

## 2019-05-21 ENCOUNTER — OFFICE VISIT (OUTPATIENT)
Dept: BEHAVIORAL/MENTAL HEALTH CLINIC | Age: 74
End: 2019-05-21

## 2019-05-21 VITALS
DIASTOLIC BLOOD PRESSURE: 81 MMHG | BODY MASS INDEX: 27.9 KG/M2 | HEART RATE: 87 BPM | HEIGHT: 56 IN | WEIGHT: 124 LBS | SYSTOLIC BLOOD PRESSURE: 173 MMHG

## 2019-05-21 DIAGNOSIS — G47.01 INSOMNIA DUE TO MEDICAL CONDITION: ICD-10-CM

## 2019-05-21 DIAGNOSIS — R13.12 OROPHARYNGEAL DYSPHAGIA: ICD-10-CM

## 2019-05-21 DIAGNOSIS — R13.12 OROPHARYNGEAL DYSPHAGIA: Primary | ICD-10-CM

## 2019-05-21 DIAGNOSIS — F02.818 FRONTOTEMPORAL DEMENTIA WITH BEHAVIORAL DISTURBANCE (HCC): Primary | ICD-10-CM

## 2019-05-21 DIAGNOSIS — G31.09 FRONTOTEMPORAL DEMENTIA WITH BEHAVIORAL DISTURBANCE (HCC): Primary | ICD-10-CM

## 2019-05-21 PROCEDURE — 74230 X-RAY XM SWLNG FUNCJ C+: CPT

## 2019-05-21 PROCEDURE — 92611 MOTION FLUOROSCOPY/SWALLOW: CPT

## 2019-05-21 RX ORDER — QUETIAPINE FUMARATE 25 MG/1
TABLET, FILM COATED ORAL
Refills: 1 | COMMUNITY
Start: 2019-04-17 | End: 2019-07-23 | Stop reason: SDUPTHER

## 2019-05-21 RX ORDER — MELATONIN 5 MG
10 CAPSULE ORAL
COMMUNITY
End: 2019-07-23 | Stop reason: SDUPTHER

## 2019-05-21 NOTE — PROGRESS NOTES
Psychiatric Outpatient Progress Note    Account Number:  [id number]  Name: Davin Hopper    SUBJECTIVE:    CHIEF COMPLAINT:  Davin Hopper is a 75 y.o. , [facility] female and was seen today for her first follow-up of psychiatric condition and psychotropic medication management. She was brought by her daughter.      HPI:    Tolu Brooks reports the following psychiatric symptoms:  agitation, anxiety and FTD. The symptoms have been present for years and are of high severity. The symptoms occur daily.     Patient was seen in the presence of her daughter. She was not able to ambulate without significant assistance and is taking only very small steps. Daughter reported that she is little bit better. She has hard time initiating her walk. Uses walker at home for ambulation. Her affect is bright today. She continues to eat  sweets more than anything. Her behavior has calmed down and she is not as agitated as before. She is sleeping little more ( 4-5 hours) per night. No day time naps. He is increasingly incontinent of bladder and is not aware of it. Patient requires significantly more assistance with her ADLs. She is more confused and continues to mumble a lot when awake. Patient is also very resistive of taking medications.      Patient has gained 3  pounds and his current BMI is 28. Her blood pressure and heart rate is within normal limits. Patient was almost aphasic.  Pt is currently treated for mild UTI by her PCP and has been referred to urologist.     Contributing factors include: progressive dementia     Patient denies SI/HI/SIB.      Side Effects:  weight gain, somnolence, tremor       Fam/Soc Hx (from [place] with updates):   Patient has been  for 52+ years. Jailene Levy has 1 son and 1 daughter. Jailene Levy currently lives with her daughter and her . Jailene Levy high school education from [place].  She came to [facility] in 1993 with her family. 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 Hollie Nicole is no history of childhood abuse. Chano Morrison is no history of legal problems.      REVIEW OF SYSTEMS:  Constitutional: positive for fatigue and weight gain  Eyes: positive for contacts/glasses  Ears, nose, mouth, throat, and face: positive for hearing loss  Respiratory: negative for cough or wheezing  Cardiovascular: negative for chest pain, palpitations  Gastrointestinal: negative for reflux symptoms and constipation  Genitourinary:positive for frequency and urinary incontinence  Musculoskeletal:positive for arthralgias  Neurological: positive for severe gait instability,  memory problems  Behavioral/Psych: positive for aggressive behavior, behavior problems and sleep disturbance, negative for SI or HI     Scales: (1/8/19)  [facility] [place] [place]: 14/28 - [facility]    Visit Vitals  /81   Pulse 87   Ht 4' 8\" (1.422 m)   Wt 56.2 kg (124 lb)   BMI 27.80 kg/m²       OBJECTIVE:                 Mental Status exam: WNL except for      Sensorium  confused   Relations passive    Eye Contact    poor   Appearance:  age appropriate and casually dressed   Motor Behavior/Gait:  hypoactive, gait unsteady and bradykinesis   Speech:  hypoverbal and soft   Thought Process: disorganized   Thought Content preoccupations   Suicidal ideations none   Homicidal ideations none   Mood:  anxious   Affect:  full range   Memory recent  impaired   Memory remote:  impaired   Concentration:  impaired   Abstraction:  concrete   Insight:  poor   Reliability poor   Judgment:  poor       MEDICAL DECISION MAKING  Data: pertinent labs, imaging, medical records and diagnostic tests reviewed and incorporated in diagnosis and treatment plan    No Known Allergies     Current Outpatient Medications   Medication Sig Dispense Refill    melatonin 5 mg cap capsule Take 10 mg by mouth nightly.  QUEtiapine (SEROQUEL) 50 mg tablet Take 1 Tab by mouth nightly. For sleep. (Patient taking differently: Take 25 mg by mouth nightly. For sleep.   Indications: should be 25mg per daughter) 80 Tab 1    divalproex DR (DEPAKOTE) 125 mg tablet Take 1 Tab by mouth three (3) times daily. (Patient taking differently: Take 62.5 mg by mouth nightly.) 90 Tab 1    multivitamin (ONE A DAY) tablet Take 1 Tab by mouth daily.  furosemide (LASIX) 20 mg tablet 1 tablet daily. 30 Tab 1    QUEtiapine (SEROQUEL) 25 mg tablet TAKE 1 TABLET BY MOUTH EVERY DAY AT NIGHT  1          Problems addressed today:    ICD-10-CM ICD-9-CM    1. Frontotemporal dementia with behavioral disturbance G31.09 331.19     F02.81 294.11    2. Insomnia due to medical condition G47.01 327.01        Assessment:   Griffin Mendoza He  is a 75 y.o.  female  Is partially  responding to treatment. Symptoms are slowly improving Patient denies SI/HI/SIB. No evidence of AH/VH or delusions. Risk Scoring- chronic illnesses and prescription drug management    Treatment Plan:  1. Medications:          Medication Changes/Adjustments: Continue Seroquel 50 mg, 1/2 Po at bedtime                                                              Continue  Depakote 125 mg, 1/2 PO , at bedtime                                                              Continue melatonin 10 mg, 1 PO, at bedtime    Current Outpatient Medications   Medication Sig Dispense Refill    melatonin 5 mg cap capsule Take 10 mg by mouth nightly.  QUEtiapine (SEROQUEL) 50 mg tablet Take 1 Tab by mouth nightly. For sleep. (Patient taking differently: Take 25 mg by mouth nightly. For sleep. Indications: should be 25mg per daughter) 80 Tab 1    divalproex DR (DEPAKOTE) 125 mg tablet Take 1 Tab by mouth three (3) times daily. (Patient taking differently: Take 62.5 mg by mouth nightly.) 90 Tab 1    multivitamin (ONE A DAY) tablet Take 1 Tab by mouth daily.  furosemide (LASIX) 20 mg tablet 1 tablet daily.  30 Tab 1    QUEtiapine (SEROQUEL) 25 mg tablet TAKE 1 TABLET BY MOUTH EVERY DAY AT NIGHT  1                  The following regarding medications was addressed:    (The risks and benefits of the proposed medication; the potential medication side effects ie    dry mouth, weight gain, GI upset, headache; patient given opportunity to ask questions)       2. Counseling and coordination of care including instructions for treatment, risks/benefits, risk factor reduction and patient/family education. She agrees with the plan. Patient instructed to call with any side effects, questions or issues. 3.  Pt was referred to Neurology to r/o PD    4. Daughter was provided supportive counseling for her caregiver stress. PSYCHOTHERAPY:  approx 20 minutes  Type:  Supportive/Solution Focused psychotherapy provided  Focus:     Current problems:   Progressive dementia   Medical issues    Psychoeducation provided:  Psych medications    Treatment plan reviewed with patient-including diagnosis and medications    Inezin is not progressing.     Prachi Smith MD  5/21/2019
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
## 2019-05-22 ENCOUNTER — TELEPHONE (OUTPATIENT)
Dept: INTERNAL MEDICINE CLINIC | Age: 74
End: 2019-05-22

## 2019-05-22 NOTE — TELEPHONE ENCOUNTER
Left message for pts daughter in regards to pts Speech referral. [facility] for [facility]. Referral also placed in mail.

## 2019-05-22 NOTE — TELEPHONE ENCOUNTER
----- Message from Robin Kruger MD sent at 5/21/2019 11:03 PM EDT -----  Please notify pt's daughter of results which have also been reported to her by the speech pathologist.    +aspiration with liquids while using a straw. Speech pathology recommendations  Two options for liquids at this time:  1. Continue thin liquids with small, single sips, slow rate, and no straws  2. Modify to nectar liquids with no straws, however patient may take rapid, successive sips as is her preference   Strongly recommend OP SLP treatment for additional education and to ensure tolerance of nectar thick liquids    Please help arrange outpatient speech pathology treatment at [address] [place] [address] - referral order placed.

## 2019-05-22 NOTE — PROGRESS NOTES
Please notify pt's daughter of results which have also been reported to her by the speech pathologist.    +aspiration with liquids while using a straw. Speech pathology recommendations  Two options for liquids at this time:  1. Continue thin liquids with small, single sips, slow rate, and no straws  2. Modify to nectar liquids with no straws, however patient may take rapid, successive sips as is her preference   Strongly recommend OP SLP treatment for additional education and to ensure tolerance of nectar thick liquids    Please help arrange outpatient speech pathology treatment at [address] [place] [address] - referral order placed.

## 2019-05-22 NOTE — TELEPHONE ENCOUNTER
Reviewed results and recommendations per Dr. Charlene Tenorio note to Jerel (pt daughter), she verbalized her understanding. I also provided her with [facility] contact information and phone number for referral.    Will check with Dr. Kiah Oliver  to see if there is anything else that needs to be done.

## 2019-05-30 ENCOUNTER — TELEPHONE (OUTPATIENT)
Dept: INTERNAL MEDICINE CLINIC | Age: 74
End: 2019-05-30

## 2019-05-30 DIAGNOSIS — R26.89 SHUFFLING GAIT: ICD-10-CM

## 2019-05-30 DIAGNOSIS — R26.89 POOR BALANCE: ICD-10-CM

## 2019-05-30 DIAGNOSIS — G31.09 FRONTOTEMPORAL DEMENTIA WITH BEHAVIORAL DISTURBANCE (HCC): Primary | ICD-10-CM

## 2019-05-30 DIAGNOSIS — F02.818 FRONTOTEMPORAL DEMENTIA WITH BEHAVIORAL DISTURBANCE (HCC): Primary | ICD-10-CM

## 2019-05-31 NOTE — TELEPHONE ENCOUNTER
Spoke with pt daughter, on HIPPA, after verifying pt name and . Daughter is requesting wheelchair for pt due to ambulation difficulties. Let daughter know I will send message to provider. Answered any and all questions daughter had. Daughter voiced understanding. Please place order for wheel chair to be faxed to [address].  Thank you

## 2019-05-31 NOTE — TELEPHONE ENCOUNTER
Spoke with pt daughter, after verifying pt name and . Let daughter know order for wheel chair has been faxed to [facility] # [phone]. Answered any and all questions daughter had, daughter voiced understanding.

## 2019-05-31 NOTE — TELEPHONE ENCOUNTER
General order written. Please confirm that this wheelchair is for transportation outside of clinic.    George Emery MD

## 2019-06-05 ENCOUNTER — DOCUMENTATION ONLY (OUTPATIENT)
Dept: INTERNAL MEDICINE CLINIC | Age: 74
End: 2019-06-05

## 2019-06-05 NOTE — PROGRESS NOTES
Received via fax Blank DME [facility] Form on 6/5/19,scanned into CC,placed in [address].

## 2019-06-05 NOTE — PROGRESS NOTES
Home Care Delivered DME For Mobility Form faxed to # [phone] on 6/5/19,confirmation received placed in Centralized Scanning.

## 2019-06-06 ENCOUNTER — DOCUMENTATION ONLY (OUTPATIENT)
Dept: INTERNAL MEDICINE CLINIC | Age: 74
End: 2019-06-06

## 2019-06-06 NOTE — PROGRESS NOTES
[address] Verbal Order Form For Home Medical Equipment faxed to # [phone] on 6/6/19,confirmation received placed in Centralized Scanning.

## 2019-06-10 ENCOUNTER — TELEPHONE (OUTPATIENT)
Dept: INTERNAL MEDICINE CLINIC | Age: 74
End: 2019-06-10

## 2019-06-10 NOTE — TELEPHONE ENCOUNTER
Pt's daughter Maged Rodas) called to check status of Shaheen Henry for wheelchair. Writer informed Maged Rodas) that the nurse faxed the necessary paperwork and form on 5/31/19. And that Martha Arango need's to get approval thru the pt's insurance.

## 2019-06-11 ENCOUNTER — TELEPHONE (OUTPATIENT)
Dept: INTERNAL MEDICINE CLINIC | Age: 74
End: 2019-06-11

## 2019-06-12 NOTE — TELEPHONE ENCOUNTER
Spoke with pt daughter, after verifying pt name and . Went over swallow study and recommendations . Daughter stated she was [place] and would call me back . Verified daughter had office #. Awaiting return call.

## 2019-06-12 NOTE — TELEPHONE ENCOUNTER
Called and spoke with pt daughter, after verifying pt name and . Went over results again and recommendations for speech pathologist  . Per daughter, she doesn't feel pt would benefit due to language barrier. Per daughter, [place] as been out before and pt would not speech would only smile. I informed daughter that I would inform provider of her concerns. Answered any and all questions, daughter voiced understanding.      Please advise, thank you

## 2019-06-12 NOTE — TELEPHONE ENCOUNTER
Please advise,   Patient with risk of aspiration, per recent swallow study. I recommend patient and daughter meet with speech pathologist.     Referral pended. [facility] could [facility], or home [facility]. Please let me know what is preferred.      Simone Natarajan MD

## 2019-07-11 ENCOUNTER — OFFICE VISIT (OUTPATIENT)
Dept: NEUROLOGY | Age: 74
End: 2019-07-11

## 2019-07-11 VITALS
HEART RATE: 88 BPM | BODY MASS INDEX: 27.8 KG/M2 | HEIGHT: 56 IN | DIASTOLIC BLOOD PRESSURE: 64 MMHG | OXYGEN SATURATION: 98 % | SYSTOLIC BLOOD PRESSURE: 142 MMHG

## 2019-07-11 DIAGNOSIS — F02.80 FRONTO-TEMPORAL DEMENTIA (HCC): ICD-10-CM

## 2019-07-11 DIAGNOSIS — G31.09 FRONTO-TEMPORAL DEMENTIA (HCC): ICD-10-CM

## 2019-07-11 DIAGNOSIS — R26.9 GAIT DISTURBANCE: ICD-10-CM

## 2019-07-11 DIAGNOSIS — R20.0 SENSORY LOSS: Primary | ICD-10-CM

## 2019-07-11 NOTE — PATIENT INSTRUCTIONS
A Healthy Lifestyle: Care Instructions  Your Care Instructions    A healthy lifestyle can help you feel good, stay at a healthy weight, and have plenty of energy for both work and play. A healthy lifestyle is something you can share with your whole family. A healthy lifestyle also can lower your risk for serious health problems, such as high blood pressure, heart disease, and diabetes. You can follow a few steps listed below to improve your health and the health of your family. Follow-up care is a key part of your treatment and safety. Be sure to make and go to all appointments, and call your doctor if you are having problems. It's also a good idea to know your test results and keep a list of the medicines you take. How can you care for yourself at home? · Do not eat too much sugar, fat, or fast foods. You can still have dessert and treats now and then. The goal is moderation. · Start small to improve your eating habits. Pay attention to portion sizes, drink less juice and soda pop, and eat more fruits and vegetables. ? Eat a healthy amount of food. A 3-ounce serving of meat, for example, is about the size of a deck of cards. Fill the rest of your plate with vegetables and whole grains. ? Limit the amount of soda and sports drinks you have every day. Drink more water when you are thirsty. ? Eat at least 5 servings of fruits and vegetables every day. It may seem like a lot, but it is not hard to reach this goal. A serving or helping is 1 piece of fruit, 1 cup of vegetables, or 2 cups of leafy, raw vegetables. Have an apple or some carrot sticks as an afternoon snack instead of a candy bar. Try to have fruits and/or vegetables at every meal.  · Make exercise part of your daily routine. You may want to start with simple activities, such as walking, bicycling, or slow swimming. Try to be active 30 to 60 minutes every day. You do not need to do all 30 to 60 minutes all at once.  For example, you can exercise 3 times a day for 10 or 20 minutes. Moderate exercise is safe for most people, but it is always a good idea to talk to your doctor before starting an exercise program.  · Keep moving. Catherine Griffin the lawn, work in the garden, or [place]. Take the stairs instead of the elevator at work. · If you smoke, quit. People who smoke have an increased risk for heart attack, stroke, cancer, and other lung illnesses. Quitting is hard, but there are ways to boost your chance of quitting tobacco for good. ? Use nicotine gum, patches, or lozenges. ? Ask your doctor about stop-smoking programs and medicines. ? Keep trying. In addition to reducing your risk of diseases in the future, you will notice some benefits soon after you stop using tobacco. If you have shortness of breath or asthma symptoms, they will likely get better within a few weeks after you quit. · Limit how much alcohol you drink. Moderate amounts of alcohol (up to 2 drinks a day for men, 1 drink a day for women) are okay. But drinking too much can lead to liver problems, high blood pressure, and other health problems. Family health  If you have a family, there are many things you can do together to improve your health. · Eat meals together as a family as often as possible. · Eat healthy foods. This includes fruits, vegetables, lean meats and dairy, and whole grains. · Include your family in your fitness plan. Most people think of activities such as jogging or tennis as the way to fitness, but there are many ways you and your family can be more active. Anything that makes you breathe hard and gets your heart pumping is exercise. Here are some tips:  ? Walk to do errands or to take your child to school or the bus.  ? Go for a family bike ride after dinner instead of watching TV. Where can you learn more? Go to http://jose juan-juancarlos.info/. Enter I364 in the search box to learn more about \"A Healthy Lifestyle: Care Instructions. \"  Current as of: September 11, 2018  Content Version: 11.9  © 5577-1556 [employer]. Care instructions adapted under license by [employer] (which disclaims liability or warranty for this information). If you have questions about a medical condition or this instruction, always ask your healthcare professional. [address] any warranty or liability for your use of this information.

## 2019-07-11 NOTE — PROGRESS NOTES
Name: Lady Rudy Hopper    Chief Complaint: gait disturbance. This is a 75year old female with a medical history of frontotemporal dementia. She is having trouble walking. Her gait seems to be stuck to the ground. SHe is hunched over. She has poor distal sensation and foot drop. Assesment and Plan  1. Gait disturbance   MRI of the lumbar  Spine  EMG of the lower extremities  RPR    2. Fronto temporal dementia   continue low dose Seroquel    3. Sensory loss        Allergies  Patient has no known allergies. Medications  Current Outpatient Medications   Medication Sig    melatonin 5 mg cap capsule Take 10 mg by mouth nightly.  QUEtiapine (SEROQUEL) 50 mg tablet Take 1 Tab by mouth nightly. For sleep. (Patient taking differently: Take 25 mg by mouth nightly. For sleep. Indications: should be 25mg per daughter)   Louise Styles QUEtiapine (SEROQUEL) 25 mg tablet TAKE 1 TABLET BY MOUTH EVERY DAY AT NIGHT    divalproex DR (DEPAKOTE) 125 mg tablet Take 1 Tab by mouth three (3) times daily. (Patient taking differently: Take 62.5 mg by mouth nightly.)    multivitamin (ONE A DAY) tablet Take 1 Tab by mouth daily.  furosemide (LASIX) 20 mg tablet 1 tablet daily. No current facility-administered medications for this visit. Medical History  Past Medical History:   Diagnosis Date    Dementia     History of bladder infections     Osteopenia      Review of Systems   Constitutional: Negative for chills and fever. HENT: Negative for ear pain. Eyes: Negative for pain and discharge. Respiratory: Negative for cough and hemoptysis. Cardiovascular: Negative for chest pain and claudication. Gastrointestinal: Negative for constipation and diarrhea. Genitourinary: Negative for flank pain and hematuria. Musculoskeletal: Positive for back pain, falls and myalgias. Skin: Negative for itching and rash. Neurological: Positive for sensory change. Negative for headaches.    Endo/Heme/Allergies: Negative for environmental allergies. Does not bruise/bleed easily. Psychiatric/Behavioral: Negative for depression and hallucinations. Exam:  Visit Vitals  /64   Pulse 88   Ht 4' 8\" (1.422 m)   SpO2 98%   BMI 27.80 kg/m²      General: Well developed, well nourished. Patient in no apparent distress   Head: Normocephalic, atraumatic, anicteric sclera   Neck Normal ROM, No thyromegally   Lungs:  Clear to auscultation bilaterally, No wheezes or rubs   Cardiac: Regular rate and rhythm with no murmurs. Abd: Bowel sounds were audible. No tenderness on palpation   Ext: No pedal edema   Skin: Supple no rash     NeurologicExam:  Mental Status: Alert and oriented to person place and time   Speech: Fluent no aphasia or dysarthria. Cranial Nerves:  II - XII Intact   Motor:  Full and symmetric strength of upper and lower proximal and distal muscles. Normal bulk and tone. Reflexes:   Deep tendon reflexes 2+/4 and symmetric. Sensory:   Symmetric distal reduction in sensory perception affecting all modalities   Gait:  Ataxic wheel chair bound    Tremor:   No tremor noted. Cerebellar:  Coordination intact. Neurovascular: No carotid bruits. No JVD           Imaging  CT Results (most recent):  Results from Hospital Encounter encounter on 04/24/18   CT SPINE CERV WO CONT    Narrative EXAM:  CT CERVICAL SPINE WITHOUT CONTRAST    INDICATION:   Adelita Raymundo and hit head on the ground. COMPARISON: 2/22/2018. TECHNIQUE: Multislice helical CT of the cervical spine was performed without  intravenous contrast administration. Sagittal and coronal reconstructions were  generated. CT dose reduction was achieved through use of a standardized protocol  tailored for this examination and automatic exposure control for dose  modulation. Adaptive statistical iterative reconstruction (ASIR) was utilized. FINDINGS:  Straightening of normal cervical lordosis may be positional. No fracture or  dislocation.  The craniocervical junction is normal. A sub-6 mm, subpleural  nodule in the right upper lobe requires no follow-up. C2-C3:  No herniation or stenosis. Minimal disc bulge. C3-C4:  A disc osteophyte complex causes mild canal stenosis. C4-C5:  Disc osteophyte complex causes mild canal stenosis. Uncovertebral and  facet osteoarthritis cause mild right neural foraminal stenosis. C5-C6:  Uncovertebral and facet osteoarthritis cause right and mild left neural  foraminal stenosis. A disc osteophyte complex causes mild to moderate canal  stenosis. C6-C7:  A disc osteophyte complex causes mild to moderate canal stenosis. Uncovertebral osteoarthritis causes left neural foraminal stenosis. C7-T1:  No herniation or stenosis. Impression IMPRESSION: No fracture. Mild degenerative disease as described above, worst at  C5-C6. MRI Results (most recent):  Results from [place] encounter on 02/22/18   MRI BRAIN WO CONT    Narrative INDICATION: Altered mental status. Exam: Multiplanar noncontrast MRI of the brain is performed with T1, T2,  gradient, FLAIR and diffusion sequences. Direct comparison is made to prior CT dated February 22, 2018. FINDINGS: There is mild diffuse cortical atrophy. There are periventricular  white matter hyperintensities consistent with chronic microvascular ischemic  changes. There is no restricted diffusion to suggest acute infarct. The  ventricular system is normal.The cervicomedullary junction is normal and there  is no tonsillar ectopia. There is no acute intracranial hemorrhage, prior  intracranial hemorrhage or extra-axial fluid collection. The visualized vascular  flow-voids of the skull base are normal. There is no intracranial mass lesion,  mass effect or herniation. Impression IMPRESSION: No acute intracranial hemorrhage or infarct.            Lab Review  Lab Results   Component Value Date/Time    WBC 6.4 01/08/2019 04:17 PM    HCT 36.3 01/08/2019 04:17 PM    HGB 12.0 01/08/2019 04:17 PM    PLATELET 791 72/68/7642 04:17 PM       Lab Results   Component Value Date/Time    Sodium 140 01/08/2019 04:17 PM    Potassium 4.1 01/08/2019 04:17 PM    Chloride 102 01/08/2019 04:17 PM    CO2 24 01/08/2019 04:17 PM    Glucose 87 01/08/2019 04:17 PM    Glucose 87 04/12/2017 06:14 AM    BUN 19 01/08/2019 04:17 PM    Creatinine 0.85 01/08/2019 04:17 PM    Calcium 9.8 01/08/2019 04:17 PM     Lab Results   Component Value Date/Time    Hemoglobin A1c 5.4 10/04/2018 11:35 AM        Lab Results   Component Value Date/Time    Vitamin B12 728 02/22/2018 10:22 PM     Lab Results   Component Value Date/Time    Cholesterol, total 137 02/22/2018 10:22 PM    HDL Cholesterol 64 02/22/2018 10:22 PM    LDL, calculated 59.4 02/22/2018 10:22 PM    VLDL, calculated 13.6 02/22/2018 10:22 PM    Triglyceride 68 02/22/2018 10:22 PM    CHOL/HDL Ratio 2.1 02/22/2018 10:22 PM

## 2019-07-12 LAB
B BURGDOR IGG+IGM SER-ACNC: <0.91 ISR (ref 0–0.9)
ERYTHROCYTE [DISTWIDTH] IN BLOOD BY AUTOMATED COUNT: 14 % (ref 12.3–15.4)
EST. AVERAGE GLUCOSE BLD GHB EST-MCNC: 126 MG/DL
HBA1C MFR BLD: 6 % (ref 4.8–5.6)
HCT VFR BLD AUTO: 37.9 % (ref 34–46.6)
HGB BLD-MCNC: 12.2 G/DL (ref 11.1–15.9)
MCH RBC QN AUTO: 30.4 PG (ref 26.6–33)
MCHC RBC AUTO-ENTMCNC: 32.2 G/DL (ref 31.5–35.7)
MCV RBC AUTO: 95 FL (ref 79–97)
PLATELET # BLD AUTO: 264 X10E3/UL (ref 150–450)
RBC # BLD AUTO: 4.01 X10E6/UL (ref 3.77–5.28)
RPR SER QL: NON REACTIVE
WBC # BLD AUTO: 6.4 X10E3/UL (ref 3.4–10.8)

## 2019-07-23 ENCOUNTER — OFFICE VISIT (OUTPATIENT)
Dept: BEHAVIORAL/MENTAL HEALTH CLINIC | Age: 74
End: 2019-07-23

## 2019-07-23 VITALS
DIASTOLIC BLOOD PRESSURE: 60 MMHG | HEART RATE: 80 BPM | SYSTOLIC BLOOD PRESSURE: 127 MMHG | WEIGHT: 118 LBS | HEIGHT: 56 IN | BODY MASS INDEX: 26.54 KG/M2

## 2019-07-23 DIAGNOSIS — G31.09 FRONTOTEMPORAL DEMENTIA WITH BEHAVIORAL DISTURBANCE (HCC): Primary | ICD-10-CM

## 2019-07-23 DIAGNOSIS — G47.01 INSOMNIA DUE TO MEDICAL CONDITION: ICD-10-CM

## 2019-07-23 DIAGNOSIS — F02.818 FRONTOTEMPORAL DEMENTIA WITH BEHAVIORAL DISTURBANCE (HCC): Primary | ICD-10-CM

## 2019-07-23 RX ORDER — MELATONIN 5 MG
10 CAPSULE ORAL
Qty: 30 CAP | Refills: 3 | Status: SHIPPED | OUTPATIENT
Start: 2019-07-23 | End: 2020-04-28

## 2019-07-23 RX ORDER — GLUCOSAMINE/CHONDR SU A SOD 750-600 MG
TABLET ORAL
COMMUNITY
End: 2020-04-28

## 2019-07-23 RX ORDER — QUETIAPINE FUMARATE 25 MG/1
TABLET, FILM COATED ORAL
Qty: 30 TAB | Refills: 3 | Status: SHIPPED | OUTPATIENT
Start: 2019-07-23 | End: 2020-04-28

## 2019-07-23 NOTE — PROGRESS NOTES
Psychiatric Outpatient Progress Note    Account Number:  [id number]  Name: Zaki Hopper    SUBJECTIVE:    CHIEF COMPLAINT:  Zaki Hopper is a 75 y.o. , Chinese female and was seen today for her 2 month follow-up of psychiatric condition and psychotropic medication management. She was brought by her daughter.      HPI:    Bethel reports the following psychiatric symptoms:  agitation, anxiety and FTD.  The symptoms have been present for years and are of high severity. The symptoms occur daily.     Patient was seen in the presence of her daughter. Faizan Tran was  able to ambulate with moderate assistance. Daughter reported that she is  better. She was initiating her walk. Uses walker at home for ambulation. Her affect is bright today. She continues to eat  sweets more than anything. Lanse Dart behavior has calmed down and she is not as agitated as before. Faizan Tran is sleeping better. Nagi Montemayor is increasingly incontinent of bladder and is not aware of it.  Patient requires significantly more assistance with her ADLs. Pt is almost aphasic. Faizan Tran is more confused and continues to mumble a lot when awake. Daughter stopped her Depakote as patient was refusing to take it.      Patient has lost 6 pounds and his current BMI is 26.  Her blood pressure and heart rate is within normal limits.  Patient was almost aphasic.  Pt is continuing to have FU with urologist and neurologist. No new medications.      Contributing factors include: progressive dementia     Patient denies SI/HI/SIB.      Side Effects:  weight gain, somnolence, tremor       Fam/Soc Hx (from [facility] with updates):   Patient has been  for 52+ years. Faizan Tran has 1 son and 1 daughter. Faizan Tran currently lives with her daughter and her . Faizan Tran high school education from [place].  She came to [facility] in 1993 with her family. 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 Chapo Guaman is no history of childhood abuse. Chapo Guaman is no history of legal problems.      REVIEW OF SYSTEMS:  Constitutional: positive for fatigue and weight loss  Eyes: positive for contacts/glasses  Ears, nose, mouth, throat, and face: positive for hearing loss  Respiratory: negative for cough or wheezing  Cardiovascular: negative for chest pain, palpitations  Gastrointestinal: negative for reflux symptoms and constipation  Genitourinary:positive for frequency and urinary incontinence  Musculoskeletal:positive for arthralgias  Neurological: positive for severe gait instability,  memory problems  Behavioral/Psych: positive for aggressive behavior, behavior problems and sleep disturbance, negative for SI or HI     Scales: (1/8/19)  [facility]: 14/28 - [facility]    Visit Vitals  /60   Pulse 80   Ht 4' 8\" (1.422 m)   Wt 53.5 kg (118 lb)   BMI 26.46 kg/m²       OBJECTIVE:                 Mental Status exam: WNL except for      Sensorium  confused   Relations passive    Eye Contact    poor   Appearance:  age appropriate and casually dressed   Motor Behavior/Gait:  hypoactive and gait unsteady   Speech:  aphasia   Thought Process: disorganized   Thought Content free of delusions, free of hallucinations and poverty of content   Suicidal ideations none   Homicidal ideations none   Mood:  euthymic   Affect:  full range   Memory recent  impaired   Memory remote:  impaired   Concentration:  impaired   Abstraction:  concrete   Insight:  poor   Reliability poor   Judgment:  poor       MEDICAL DECISION MAKING  Data: pertinent labs, imaging, medical records and diagnostic tests reviewed and incorporated in diagnosis and treatment plan    No Known Allergies     Current Outpatient Medications   Medication Sig Dispense Refill    Biotin 2,500 mcg cap Take  by mouth.  QUEtiapine (SEROQUEL) 25 mg tablet TAKE 1 TABLET BY MOUTH EVERY DAY AT NIGHT 30 Tab 3    melatonin 5 mg cap capsule Take 2 Caps by mouth nightly. 30 Cap 3    multivitamin (ONE A DAY) tablet Take 1 Tab by mouth daily.             Problems addressed today:    ICD-10-CM ICD-9-CM    1. Frontotemporal dementia with behavioral disturbance G31.09 331.19     F02.81 294.11    2. Insomnia due to medical condition G47.01 327.01        Assessment:   Daniel Biggs He  is a 75 y.o.  female  Is partially responding to treatment. Symptoms are stable. Patient denies SI/HI/SIB. No evidence of AH/VH or delusions. Risk Scoring- chronic illnesses and prescription drug management    Treatment Plan:  1. Medications:          Medication Changes/Adjustments: continue Seroquel and melatonin in the current dosages. Current Outpatient Medications   Medication Sig Dispense Refill    Biotin 2,500 mcg cap Take  by mouth.  QUEtiapine (SEROQUEL) 25 mg tablet TAKE 1 TABLET BY MOUTH EVERY DAY AT NIGHT 30 Tab 3    melatonin 5 mg cap capsule Take 2 Caps by mouth nightly. 30 Cap 3    multivitamin (ONE A DAY) tablet Take 1 Tab by mouth daily. The following regarding medications was addressed:    (The risks and benefits of the proposed medication; the potential medication side effects ie    dry mouth, weight gain, GI upset, headache; patient given opportunity to ask questions)       2. Counseling and coordination of care including instructions for treatment, risks/benefits, risk factor reduction and patient/family education. Daughter agrees with the plan. Daughter instructed to call with any side effects, questions or issues. 3.  Daughter agreed to FU with Psych NP, Dr. Robert Levy, in this practice. PSYCHOTHERAPY:  approx 20 minutes  Type:  Supportive/Solution Focused psychotherapy provided  Focus:     Current problems:   Progressive dementia    Psychoeducation provided: psych medications. Treatment plan reviewed with patient/daughter-including diagnosis and medications    Guimin is slowly declining. Follow-up and Dispositions    · Return if symptoms worsen or fail to improve.        Casper Shane MD  7/23/2019

## 2019-08-05 ENCOUNTER — OFFICE VISIT (OUTPATIENT)
Dept: NEUROLOGY | Age: 74
End: 2019-08-05

## 2019-08-05 VITALS
DIASTOLIC BLOOD PRESSURE: 70 MMHG | SYSTOLIC BLOOD PRESSURE: 136 MMHG | OXYGEN SATURATION: 97 % | WEIGHT: 118 LBS | HEIGHT: 56 IN | HEART RATE: 86 BPM | BODY MASS INDEX: 26.54 KG/M2

## 2019-08-05 DIAGNOSIS — G47.33 OBSTRUCTIVE SLEEP APNEA: Primary | ICD-10-CM

## 2019-08-05 DIAGNOSIS — R26.89 SHUFFLING GAIT: ICD-10-CM

## 2019-08-05 DIAGNOSIS — G60.3 IDIOPATHIC PROGRESSIVE POLYNEUROPATHY: ICD-10-CM

## 2019-08-13 ENCOUNTER — TELEPHONE (OUTPATIENT)
Dept: NEUROLOGY | Age: 74
End: 2019-08-13

## 2019-08-13 NOTE — TELEPHONE ENCOUNTER
----- Message from Ferdinand Light sent at 8/13/2019 11:25 AM EDT -----  Regarding: Dr. Myron Tomlin  Pt requesting a call back in regards to a referral appt to a sleep specialist. Charles Granger contact [phone].

## 2019-08-19 NOTE — PROCEDURES
ELECTRODIAGNOSTIC REPORT      Test Date:  2019    Patient: Nelli Bales  : 1945 Physician: Alla Kessler M.D.   ID#: [id number] SEX: Female Ref. Phys: Alla Kessler M.D. Patient History / Exam:  This is a 76-year-old female with medical history of dementia presents with distal sensory loss and shuffling gait. EMG & NCV Findings:  Evaluation of the left Fibular motor and the right Fibular motor nerves showed normal distal onset latency (L2.7, R2.4 ms), normal amplitude (L4.1, R3.1 mV), normal conduction velocity (B Fib-Ankle, L55, R44 m/s), and normal conduction velocity (Poplt-B Fib, L67, R111 m/s). The left tibial motor and the right tibial motor nerves showed normal distal onset latency (L4.1, R5.7 ms), normal amplitude (L6.8, R6.7 mV), and normal conduction velocity (Knee-Ankle, L55, R40 m/s). The left Sup Fibular sensory and the right Sup Fibular sensory nerves showed no response (Lower leg). The left sural sensory and the right sural sensory nerves showed no response (Calf). All F Wave latencies were within normal limits. All F Wave left vs. right side latency differences were within normal limits. All examined muscles (as indicated in the following table) showed no evidence of electrical instability. Impression  Symmetric, length dependent axonal sensory neuropathy.         ___________________________  Alla Kessler M.D.    Nerve Conduction Studies  Anti Sensory Summary Table     Stim Site NR Peak (ms) Norm Peak (ms) P-T Amp (µV) Norm P-T Amp Site1 Site2 Dist (cm)   Left Sup Fibular Anti Sensory (Lat ankle)  36.1°C   Lower leg NR  <4.6  >4 Lower leg Lat ankle 10.0   Right Sup Fibular Anti Sensory (Lat ankle)  35.5°C   Lower leg NR  <4.6  >4 Lower leg Lat ankle 10.0   Left Sural Anti Sensory (Lat Mall)  36.1°C   Calf NR  <4.5  >4.0 Calf Lat Mall 14.0   Right Sural Anti Sensory (Lat Mall)  35.6°C   Calf NR  <4.5  >4.0 Calf Lat Mall 14.0     Motor Summary Table Stim Site NR Onset (ms) Norm Onset (ms) O-P Amp (mV) Norm O-P Amp P-T Amp (mV) Site1 Site2 Dist (cm) Jovan (m/s)   Left Fibular Motor (Ext Dig Brev)  35.9°C   Ankle    2.7 <6.5 4.1 >1.1 5.9 Ankle Ext Dig Brev 8.0    B Fib    8.0  3.7  5.6 B Fib Ankle 29.0 55   Poplt    9.5  3.6  5.1 Poplt B Fib 10.0 67   Right Fibular Motor (Ext Dig Brev)  33.5°C   Ankle    2.4 <6.5 3.1 >1.1 3.8 Ankle Ext Dig Brev 8.0    B Fib    8.3  2.2  3.0 B Fib Ankle 26.0 44   Poplt    9.2  2.3  3.2 Poplt B Fib 10.0 111   Left Tibial Motor (Abd Willett Brev)  36°C   Ankle    4.1 <6.1 6.8 >1.1 11.4 Ankle Abd Willett Brev 8.0    Knee    10.5  4.5  7.8 Knee Ankle 35.0 55   Right Tibial Motor (Abd Willett Brev)  35°C   Ankle    5.7 <6.1 6.7 >1.1 9.9 Ankle Abd Willett Brev 8.0    Knee    12.7  1.6  2.1 Knee Ankle 28.0 40     F Wave Studies     NR F-Lat (ms) Lat Norm (ms) L-R F-Lat (ms) L-R Lat Norm   Left Tibial (Mrkrs) (Abd Hallucis)  36°C      45.42 <56 0.00 <5.7   Right Tibial (Mrkrs) (Abd Hallucis)  35.3°C      45.42 <56 0.00 <5.7       EMG     Side Muscle Nerve Root Ins Act Fibs Psw Recrt Duration Amp Poly Comment   Left Ext Dig Brev Dp Br Peron L5, S1 Nml Nml Nml Nml Nml Nml Nml    Left AbdHallucis MedPlantar S1-2 Nml Nml Nml Nml Nml Nml Nml    Left AntTibialis Dp Br Peron L4-5 Nml Nml Nml Nml Nml Nml Nml    Left VastusLat Femoral L2-4 Nml Nml Nml Nml Nml Nml Nml      Waveforms:

## 2019-09-05 ENCOUNTER — TELEPHONE (OUTPATIENT)
Dept: INTERNAL MEDICINE CLINIC | Age: 74
End: 2019-09-05

## 2019-09-05 NOTE — TELEPHONE ENCOUNTER
Pts daughter Virgie Sicard came into office stating that [employer] is still stating they never received information for wheelchair. Writer informed her based off previous note back in July  that a nurse had spoke w/ Palma Isabel from [facility] and information was sent to insurance and [facility] care. She is wondering if prescription can be hand written and she just deliver it to [facility]  .  Dani [place] based # is [phone]

## 2019-09-06 NOTE — TELEPHONE ENCOUNTER
Spoke with pt daughter Rj Richardson, after verifying pt name and . Let Rj Richardson know I had printed out everything that has been faxed to [address] regarding pt wheelchair. Provided name of person at [address] I had spoken with back in July when order was faxed. Pt daughter stated she will come by the office later to  papers and take to [employer]. I apologized to levi that pt had not received wheelchair yet. Rj Richardson was understanding and thankful we were able to reprint orders . Answered any and all questions Rj Richardson had, Rj Richardson voiced understanding.

## 2019-09-26 ENCOUNTER — OFFICE VISIT (OUTPATIENT)
Dept: INTERNAL MEDICINE CLINIC | Age: 74
End: 2019-09-26

## 2019-09-26 ENCOUNTER — TELEPHONE (OUTPATIENT)
Dept: INTERNAL MEDICINE CLINIC | Age: 74
End: 2019-09-26

## 2019-09-26 VITALS
OXYGEN SATURATION: 98 % | HEART RATE: 91 BPM | SYSTOLIC BLOOD PRESSURE: 146 MMHG | RESPIRATION RATE: 17 BRPM | HEIGHT: 56 IN | DIASTOLIC BLOOD PRESSURE: 73 MMHG | BODY MASS INDEX: 27.9 KG/M2 | WEIGHT: 124 LBS | TEMPERATURE: 97.7 F

## 2019-09-26 DIAGNOSIS — G31.09 FRONTOTEMPORAL DEMENTIA WITH BEHAVIORAL DISTURBANCE (HCC): ICD-10-CM

## 2019-09-26 DIAGNOSIS — Z00.00 MEDICARE ANNUAL WELLNESS VISIT, SUBSEQUENT: Primary | ICD-10-CM

## 2019-09-26 DIAGNOSIS — M85.80 OSTEOPENIA, UNSPECIFIED LOCATION: ICD-10-CM

## 2019-09-26 DIAGNOSIS — G62.9 PERIPHERAL POLYNEUROPATHY: ICD-10-CM

## 2019-09-26 DIAGNOSIS — R29.6 RECURRENT FALLS: ICD-10-CM

## 2019-09-26 DIAGNOSIS — Z23 ENCOUNTER FOR IMMUNIZATION: ICD-10-CM

## 2019-09-26 DIAGNOSIS — F02.818 FRONTOTEMPORAL DEMENTIA WITH BEHAVIORAL DISTURBANCE (HCC): ICD-10-CM

## 2019-09-26 PROBLEM — R73.02 IGT (IMPAIRED GLUCOSE TOLERANCE): Status: ACTIVE | Noted: 2019-09-26

## 2019-09-26 RX ORDER — GLUCOSAMINE HCL
POWDER (GRAM) MISCELLANEOUS
COMMUNITY
End: 2020-02-25

## 2019-09-26 RX ORDER — POTASSIUM &MAGNESIUM ASPARTATE 250-250 MG
500 CAPSULE ORAL DAILY
COMMUNITY

## 2019-09-26 RX ORDER — VITAMIN E CAP 100 UNIT 100 UNIT
CAP ORAL DAILY
COMMUNITY
End: 2020-05-05 | Stop reason: CLARIF

## 2019-09-26 RX ORDER — UREA 10 %
100 LOTION (ML) TOPICAL DAILY
COMMUNITY

## 2019-09-26 NOTE — PATIENT INSTRUCTIONS
Medicare Wellness Visit, Female The best way to live healthy is to have a lifestyle where you eat a well-balanced diet, exercise regularly, limit alcohol use, and quit all forms of tobacco/nicotine, if applicable. Regular preventive services are another way to keep healthy. Preventive services (vaccines, screening tests, monitoring & exams) can help personalize your care plan, which helps you manage your own care. Screening tests can find health problems at the earliest stages, when they are easiest to treat. [facility] follows the current, evidence-based guidelines published by the [facility] ([facility]) when recommending preventive services for our patients. Because we follow these guidelines, sometimes recommendations change over time as research supports it. (For example, mammograms used to be recommended annually. Even though Medicare will still pay for an annual mammogram, the newer guidelines recommend a mammogram every two years for women of average risk.) Of course, you and your doctor may decide to screen more often for some diseases, based on your risk and your health status. Preventive services for you include: - Medicare offers their members a free annual wellness visit, which is time for you and your primary care provider to discuss and plan for your preventive service needs. Take advantage of this benefit every year! 
-All adults over the age of 72 should receive the recommended pneumonia vaccines. Current USPSTF guidelines recommend a series of two vaccines for the best pneumonia protection.  
-All adults should have a flu vaccine yearly and a tetanus vaccine every 10 years. All adults age 61 and older should receive a shingles vaccine once in their lifetime.   
-A bone mass density test is recommended when a woman turns 65 to screen for osteoporosis. This test is only recommended one time, as a screening. Some providers will use this same test as a disease monitoring tool if you already have osteoporosis. -All adults age 38-68 who are overweight should have a diabetes screening test once every three years.  
-Other screening tests and preventive services for persons with diabetes include: an eye exam to screen for diabetic retinopathy, a kidney function test, a foot exam, and stricter control over your cholesterol.  
-Cardiovascular screening for adults with routine risk involves an electrocardiogram (ECG) at intervals determined by your doctor.  
-Colorectal cancer screenings should be done for adults age 54-65 with no increased risk factors for colorectal cancer. There are a number of acceptable methods of screening for this type of cancer. Each test has its own benefits and drawbacks. Discuss with your doctor what is most appropriate for you during your annual wellness visit. The different tests include: colonoscopy (considered the best screening method), a fecal occult blood test, a fecal DNA test, and sigmoidoscopy. -Breast cancer screenings are recommended every other year for women of normal risk, age 54-69. 
-Cervical cancer screenings for women over age 72 are only recommended with certain risk factors.  
-All adults born between [facility] should be screened once for Hepatitis C. Here is a list of your current Health Maintenance items (your personalized list of preventive services) with a due date: 
Health Maintenance Due Topic Date Due  
 DTaP/Tdap/Td  (1 - Tdap) 05/25/1965  Shingles Vaccine (1 of 2) 05/25/1994  Glaucoma Screening   05/25/2009  Pneumococcal Vaccine (2 of 2 - PPSV23) 07/12/2018 [address] [place] [address] Annual Well Visit  08/01/2018  Flu Vaccine  08/01/2019
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
## 2019-09-26 NOTE — PROGRESS NOTES
RM 1    Chief Complaint   Patient presents with   [facility] Annual Wellness Visit         Form Completion     Needs DM shoes form completed     Difficulty Walking     Patient currently doing PT but daughter reports decreased ability to walk, reports patient is falling 2-3 times daily with a walker, has not yet recieved wheel chair        1. Have you been to the ER, urgent care clinic since your last visit? Hospitalized since your last visit?no      2. Have you seen or consulted any other health care providers outside of the [address] [place] [address] since your last visit? Include any pap smears or colon screening. no    Health Maintenance Due   Topic Date Due    DTaP/Tdap/Td series (1 - Tdap) 05/25/1965    Shingrix Vaccine Age 50> (1 of 2) 05/25/1994    GLAUCOMA SCREENING Q2Y  05/25/2009    Pneumococcal 65+ years (2 of 2 - PPSV23) 07/12/2018    MEDICARE YEARLY EXAM  08/01/2018    Influenza Age 5 to Adult  08/01/2019     Patient desires flu shot today  Patient due for pneumovax 23 and TDAP as well     3 most recent PHQ Screens 9/26/2019   PHQ Not Done Medical Reason (indicate in comments)   Little interest or pleasure in doing things -   Feeling down, depressed, irritable, or hopeless -   Total Score PHQ 2 -     Fall Risk Assessment, last 12 mths 9/26/2019   Able to walk? Yes   Fall in past 12 months? Yes   Fall with injury? Yes   Number of falls in past 12 months 8 or more   Fall Risk Score 9     Abuse Screening Questionnaire 9/26/2019   Do you ever feel afraid of your partner? N   Are you in a relationship with someone who physically or mentally threatens you? N   Is it safe for you to go home?  Y     ADL Assessment 9/26/2019   Feeding yourself Help Needed   Getting from bed to chair Help Needed   Getting dressed Help Needed   Bathing or showering Help Needed   Walk across the room (includes cane/walker) Help Needed   Using the telphone Help Needed   Taking your medications Help Needed   Preparing meals Help Needed   Managing money (expenses/bills) Help Needed   Moderately strenuous housework (laundry) Help Needed   Shopping for personal items (toiletries/medicines) Help Needed   Shopping for groceries Help Needed   Driving Help Needed   Climbing a flight of stairs Help Needed   Getting to places beyond walking distances Help Needed       Learning Assessment 7/23/2019   PRIMARY LEARNER Patient   BARRIERS PRIMARY LEARNER -   PRIMARY LANGUAGE CHINESE (CANTONESE)   LEARNER PREFERENCE PRIMARY PICTURES   ANSWERED BY patient   RELATIONSHIP SELF

## 2019-09-26 NOTE — TELEPHONE ENCOUNTER
Called [facility] and inquired why patient has not received their wheel chair yet. They gave me a new fax number of [phone] to the local office for the patient. They requested an office note to accompany the order and letter. Will fax as soon as the office note is ready. The daughter gave permission for them to contact her regarding the wheel chair.  I added her phone number to the cover sheet with a request that they contact her regarding the wheel chair

## 2019-09-26 NOTE — PROGRESS NOTES
HPI   Skyla Seth He is a 75 y.o. female, she presents today for:    75 year odl woman affected by frontotemporal demential with gait disturbance. Has been falling up to 2 times per day, most recently this morning. In [facility]  [address] for recent fall because of wound on left anterior leg. She falls in the home primarily. Family supervises all movement outside of the house. Uses walker in the house, but sometimes fails to maneuver in the house. Would be willing to use wheelchair if this was available. At other times seems to forget how to move. Standing still for long minutes or even hours. Recent EMG studies show that the patient has severe damage in the lower legs, \"symmetric sensory neuropathy\". PMH/PSH: reviewed and updated  Sochx:  reports that she has never smoked. She has never used smokeless tobacco. She reports that she does not drink alcohol or use drugs. Famhx: reviewed and updated     All: No Known Allergies  Med:   Current Outpatient Medications   Medication Sig    omega 3-dha-epa-fish oil (FISH OIL) 100-160-1,000 mg cap Fish Oil    glucosamine HCl (GLUCOSAMINE, BULK,) powd glucosamine (bulk) powder    cranberry 500 mg capsule Take 500 mg by mouth daily.  calcium carbonate (CALCIUM 300 PO) Take  by mouth.  vitamin e (E GEMS) 100 unit capsule Take  by mouth daily.  cyanocobalamin (VITAMIN B-12) 100 mcg tablet Take 100 mcg by mouth daily.  Biotin 2,500 mcg cap Take  by mouth.  QUEtiapine (SEROQUEL) 25 mg tablet TAKE 1 TABLET BY MOUTH EVERY DAY AT NIGHT    melatonin 5 mg cap capsule Take 2 Caps by mouth nightly.  multivitamin (ONE A DAY) tablet Take 1 Tab by mouth daily. No current facility-administered medications for this visit. Review of Systems   Unable to perform ROS: Dementia   Constitutional: Negative for chills, fever and malaise/fatigue. Respiratory: Negative for shortness of breath. Cardiovascular: Negative for chest pain.    remainder of history provided by daughter. PE:  Blood pressure 146/73, pulse 91, temperature 97.7 °F (36.5 °C), temperature source Oral, resp. rate 17, height 4' 8\" (1.422 m), weight 124 lb (56.2 kg), SpO2 98 %. Body mass index is 27.8 kg/m². Physical Exam   Constitutional: She appears well-developed and well-nourished. No distress. HENT:   Head: Normocephalic. Mouth/Throat: Oropharynx is clear and moist.   Eyes: Conjunctivae and EOM are normal.   Neck: Neck supple. Cardiovascular: Normal rate, regular rhythm and normal heart sounds. Pulmonary/Chest: Effort normal and breath sounds normal.   Neurological: She is alert. Asleep in chair when I entered room. Easily aroused and smiles and nods, not oriented to place nor person. Patient needs support to move from sitting to stand. Leans on daughter when walking in [place] together. Very unsteady and shuffling gait. Skin: Skin is warm and dry. Wound on left buttock nearly completely healed. Wound on right shin in process of healing. Buttock wound consistent with pressure wound. Nursing note and vitals reviewed. foot exam:   Left/right:   Unable to do filament test, but by EMG no sensation. Pulse DP: 2+ (normal)   Deformities: callous    Labs:   No results found for any visits on 09/26/19. A/P:  75 y.o. female    ICD-10-CM ICD-9-CM           1. Frontotemporal dementia with behavioral disturbance G31.09 331.19 AMB SUPPLY ORDER    F02.81 294.11    2. Osteopenia, unspecified location M85.80 733.90 DEXA BONE DENSITY STUDY AXIAL   3. Recurrent falls R29.6 V15.88 AMB SUPPLY ORDER   4. Peripheral polyneuropathy G62.9 356.9 AMB SUPPLY ORDER     Patient needs wheelchair for use in the home to allow for safe transfers and help when she is in spells of immobility. This is secondary to FTD and peripheral neuropathy. Orders have been placed since march 2019.  Will reorder today      - She was given AVS and expressed understanding with the diagnosis and plan as discussed. Follow-up and Dispositions    · Return in about 6 months (around 3/26/2020) for follow-up weight.        Future Appointments   Date Time Provider Kyler Dsouza   11/20/2019  3:00 PM Ben Ugarte  [facility]   11/27/2019  3:00 PM Blanca Meredith  [facility]   2/5/2020 11:00 AM Xavi Santoyo  [facility]

## 2019-10-01 ENCOUNTER — TELEPHONE (OUTPATIENT)
Dept: INTERNAL MEDICINE CLINIC | Age: 74
End: 2019-10-01

## 2019-10-01 DIAGNOSIS — N39.0 RECURRENT UTI (URINARY TRACT INFECTION): Primary | ICD-10-CM

## 2019-10-01 RX ORDER — SULFAMETHOXAZOLE AND TRIMETHOPRIM 800; 160 MG/1; MG/1
1 TABLET ORAL 2 TIMES DAILY
Qty: 6 TAB | Refills: 0 | Status: SHIPPED | OUTPATIENT
Start: 2019-10-01 | End: 2019-10-04

## 2019-10-01 NOTE — TELEPHONE ENCOUNTER
Called pt daughter Ruchi Keenan, no answer. Left vm that rx for UTI had been sent into pt pharmacy and to contact our office if she has any questions. Called Nurse Huber  Back at [facility] and let him know rx sent into pt pharmacy.

## 2019-10-01 NOTE — TELEPHONE ENCOUNTER
Received lab UA report from [place], orders written by Dr. Sheryl Mchugh wound care. Spoke with Nurse Judy Jaquez at [facility] after verifying pt name and . Caren Main that Dr. Sadie Hernandez had not ordered UA . Per Judy Jaquez , Dr. Frandy Mason name was placed on order by accident that it was to be Dr. Sadie Hernandez who is pt PCP. Caren Main that in the future to please contact our office first to let us know concerns and Dr. Sadie Hernandez can then place orders . Judy Jaquez voiced understanding. Let Judy Jaquez know I will speak with Dr. Sadie Hernandez and give him a call back . Judy Jaquez voiced understanding.

## 2019-10-01 NOTE — TELEPHONE ENCOUNTER
Received lab from 9/25 from [facility]. Obtained for \"alterred mental functoin\". Urinalysis showing nitritate and 100K+ colonies of klebsiella. Advised to not draw urine in the future unless doctors orders. Plan to treat with bactrim x3 days. Order sent to pharmacy. Please call and advise daughter.      Paty Gonzalez MD

## 2019-10-02 NOTE — TELEPHONE ENCOUNTER
Caller's first and last name and relationship (if not the patient): Cody Harmon (pt's daughter)       Best contact number(s): [phone]       Whose call is being returned: Garima Negron       Details to clarify the request: Questions about pt UTI medication

## 2019-10-03 NOTE — TELEPHONE ENCOUNTER
Spoke with pt daughter Alfa Russell (on HIPAA) , after verifying pt name and  . Alfa Russell  wanted to see if Dr. Apryl John could prescribe an abx to have on hand due to pt getting UTI's often or if Dr. Apryl John would like to have UA done on a regular basis. I informed Alfa Russell that I would speak to provider . Answered any and all questions pt daughter had, daughter voiced understanding.     Please advise, thank you

## 2019-10-07 NOTE — TELEPHONE ENCOUNTER
Writer informed pt's daughter Talib Manzano that the nurse re-faxed a letter to [address] from the provider. The provider  stated that the pt cannot use the walker that she need's to have a wheelchair. [place] Jose Juan voiced understanding however Talib Manzano did state that the [facility] nurse suggested that the pt be given a low dose antibiotic to avoid getting another UTI please call and advise on the antibiotic.

## 2019-10-07 NOTE — TELEPHONE ENCOUNTER
Called pt daughter Lobo Herr, no answer. Left vm to return call to our office at [phone] in reference to pt .  Awaiting call back

## 2019-10-07 NOTE — TELEPHONE ENCOUNTER
Understandable question. But I do not recommend this. Has there been any move on getting the wheelchair. If not, can we fire [employer] and use a different home supply company?      Astrid Khan MD

## 2019-10-07 NOTE — TELEPHONE ENCOUNTER
Spoke with pt daughter Kamryn Velazquez , after verifying pt name and . . Let daughter know that Dr. Duy Lund does not recommend putting pt on a abx for a preventive for UIT. Daughter had understanding. Answered any and all questions daughter had, daughter voiced understanding.

## 2019-10-07 NOTE — TELEPHONE ENCOUNTER
Spoke with Armani Anguiano at [employer] , pt Wheelchair is not covered by her insurance due to pt uses a walker. Letter form provider stating pt does not use walker and needs to use a wheelchair , it should be covered. Mini Ask Spoke with provider , letter ok, provider will provide letter . Will fax over letter and any other documentation needed to [address] . Once faxed, I will call pt daughter Sylvester Yuan and advise.

## 2019-10-18 ENCOUNTER — TELEPHONE (OUTPATIENT)
Dept: INTERNAL MEDICINE CLINIC | Age: 74
End: 2019-10-18

## 2019-10-18 NOTE — TELEPHONE ENCOUNTER
Please see previous message and advise on UTI question. I will address the Wheelchair issue.      Thank you

## 2019-10-18 NOTE — TELEPHONE ENCOUNTER
----- Message from York Ravens sent at 10/18/2019 12:53 PM EDT -----  Regarding: Dr. Montano Music Message/Vendor Calls    Caller's first and last name: [facility])      Reason for call: Completed antibiotics for UTI; still experiencing symptoms; decline in mobility. Also to discuss wheelchair request through insurance company.  [employer]/Medicare denied request.      Callback required yes/no and why: yes      Best contact number(s):[phone]      Details to clarify the request:      Riccardo Hernandez

## 2019-10-18 NOTE — TELEPHONE ENCOUNTER
Called and spoke with Javier Rose. He is reporting that [address] He is at her baseline but having ongoing dark and foul smelling urine. Adivsed that since there was no clinical change before or after taking antibitioic. I would advised to hold on further urine testing. However if new clinical change such as change in continence, behavior or balance, then I would endorse new testing.      Ron Potts MD

## 2019-10-31 ENCOUNTER — OFFICE VISIT (OUTPATIENT)
Dept: INTERNAL MEDICINE CLINIC | Age: 74
End: 2019-10-31

## 2019-10-31 VITALS
SYSTOLIC BLOOD PRESSURE: 163 MMHG | WEIGHT: 122.2 LBS | HEIGHT: 56 IN | HEART RATE: 91 BPM | OXYGEN SATURATION: 98 % | DIASTOLIC BLOOD PRESSURE: 78 MMHG | RESPIRATION RATE: 14 BRPM | TEMPERATURE: 98.3 F | BODY MASS INDEX: 27.49 KG/M2
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
## 2019-10-31 DIAGNOSIS — I10 ESSENTIAL HYPERTENSION: ICD-10-CM

## 2019-10-31 DIAGNOSIS — L03.116 CELLULITIS OF LEFT LOWER EXTREMITY: Primary | ICD-10-CM

## 2019-10-31 DIAGNOSIS — M79.662 PAIN IN LEFT SHIN: ICD-10-CM

## 2019-10-31 RX ORDER — DOXYCYCLINE 25 MG/5ML
100 POWDER, FOR SUSPENSION ORAL EVERY 12 HOURS
Qty: 400 ML | Refills: 0 | Status: SHIPPED | OUTPATIENT
Start: 2019-10-31 | End: 2019-11-10

## 2019-10-31 NOTE — PROGRESS NOTES
CASSIE Tomlinson He is a 75 y.o. female, she presents today for:     Fall:    - fell 2 days ago in kitchen.   - falls every day. With skin tear and swollen ankle    PMH/PSH: reviewed and updated  Sochx:  reports that she has never smoked. She has never used smokeless tobacco. She reports that she does not drink alcohol or use drugs. Famhx: reviewed and updated     All: No Known Allergies  Med:   Current Outpatient Medications   Medication Sig    omega 3-dha-epa-fish oil (FISH OIL) 100-160-1,000 mg cap Fish Oil    glucosamine HCl (GLUCOSAMINE, BULK,) powd glucosamine (bulk) powder    cranberry 500 mg capsule Take 500 mg by mouth daily.  calcium carbonate (CALCIUM 300 PO) Take  by mouth.  cyanocobalamin (VITAMIN B-12) 100 mcg tablet Take 100 mcg by mouth daily.  QUEtiapine (SEROQUEL) 25 mg tablet TAKE 1 TABLET BY MOUTH EVERY DAY AT NIGHT    melatonin 5 mg cap capsule Take 2 Caps by mouth nightly.  multivitamin (ONE A DAY) tablet Take 1 Tab by mouth daily.  vitamin e (E GEMS) 100 unit capsule Take  by mouth daily.  Biotin 2,500 mcg cap Take  by mouth. No current facility-administered medications for this visit. Review of Systems   Constitutional: Negative for chills, fever and malaise/fatigue. Respiratory: Negative for shortness of breath. Cardiovascular: Negative for chest pain. PE:  Blood pressure 163/78, pulse 91, temperature 98.3 °F (36.8 °C), temperature source Oral, resp. rate 14, height 4' 8\" (1.422 m), weight 122 lb 3.2 oz (55.4 kg), SpO2 98 %. Body mass index is 27.4 kg/m². Physical Exam   Constitutional: She is oriented to person, place, and time. She appears well-developed and well-nourished. No distress. HENT:   Head: Normocephalic. Mouth/Throat: Oropharynx is clear and moist.   Eyes: Conjunctivae and EOM are normal.   Neck: Neck supple. Cardiovascular: Normal rate, regular rhythm and normal heart sounds.    Pulmonary/Chest: Effort normal and breath sounds normal.   Musculoskeletal:   Left leg with 2x2 skin tear on leg. Also with erythema and redness extending out ~ 4 cm. Neurological: She is alert and oriented to person, place, and time. Skin: Skin is warm and dry. Nursing note and vitals reviewed. Labs:   No results found for any visits on 10/31/19. A/P:  75 y.o. female    ICD-10-CM ICD-9-CM    1. Cellulitis of left lower extremity L03.116 682.6 doxycycline monohydrate (VIBRAMYCIN) 25 mg/5 mL susr   2. Pain in left shin M79.662 729.5 XR TIB/FIB LT   3. Fall, initial encounter [address]. XXXA E888.9 XR TIB/FIB LT   4. Essential hypertension I10 401.9      Screen for fracture. Treat earlly cellulitis with doxy. BP in goal range. Continue to advocate for wheelchair for use in home. - She was given AVS and expressed understanding with the diagnosis and plan as discussed.     Future Appointments   Date Time Provider Kyler Dsouza   11/20/2019  3:00 PM Hong Moreno  [facility]   11/27/2019  3:00 PM Maryam Kumar  [facility]   2/5/2020 11:00 AM Jamie Roldan  [facility]

## 2019-10-31 NOTE — PATIENT INSTRUCTIONS
Script for doxycycline liquid medication sent to [id number] [facility] [place] because they are the only [facility] with this in stock currently. While your mother takes the doxycycline, please do not give her the calcium medication. Cellulitis: Care Instructions Your Care Instructions Cellulitis is a skin infection caused by bacteria, most often strep or staph. It often occurs after a break in the skin from a scrape, cut, bite, or puncture, or after a rash. Cellulitis may be treated without doing tests to find out what caused it. But your doctor may do tests, if needed, to look for a specific bacteria, like methicillin-resistant Staphylococcus aureus (MRSA). The doctor has checked you carefully, but problems can develop later. If you notice any problems or new symptoms, get medical treatment right away. Follow-up care is a key part of your treatment and safety. Be sure to make and go to all appointments, and call your doctor if you are having problems. It's also a good idea to know your test results and keep a list of the medicines you take. How can you care for yourself at home? · Take your antibiotics as directed. Do not stop taking them just because you feel better. You need to take the full course of antibiotics. · Prop up the infected area on pillows to reduce pain and swelling. Try to keep the area above the level of your heart as often as you can. · If your doctor told you how to care for your wound, follow your doctor's instructions. If you did not get instructions, follow this general advice: 
? Wash the wound with clean water 2 times a day. Don't use hydrogen peroxide or alcohol, which can slow healing. ? You may cover the wound with a thin layer of petroleum jelly, such as Vaseline, and a nonstick bandage. ? Apply more petroleum jelly and replace the bandage as needed. · Be safe with medicines. Take pain medicines exactly as directed. ? If the doctor gave you a prescription medicine for pain, take it as prescribed. ? If you are not taking a prescription pain medicine, ask your doctor if you can take an over-the-counter medicine. To prevent cellulitis in the future · Try to prevent cuts, scrapes, or other injuries to your skin. Cellulitis most often occurs where there is a break in the skin. · If you get a scrape, cut, mild burn, or bite, wash the wound with clean water as soon as you can to help avoid infection. Don't use hydrogen peroxide or alcohol, which can slow healing. · If you have swelling in your legs (edema), support stockings and good skin care may help prevent leg sores and cellulitis. · Take care of your feet, especially if you have diabetes or other conditions that increase the risk of infection. Wear shoes and socks. Do not go barefoot. If you have athlete's foot or other skin problems on your feet, talk to your doctor about how to treat them. When should you call for help? Call your doctor now or seek immediate medical care if: 
  · You have signs that your infection is getting worse, such as: 
? Increased pain, swelling, warmth, or redness. ? Red streaks leading from the area. ? Pus draining from the area. ? A fever.  
  · You get a rash.  
 Watch closely for changes in your health, and be sure to contact your doctor if: 
  · You do not get better as expected. Where can you learn more? Go to http://jose juan-juancarlos.info/. Dasha Dean in the search box to learn more about \"Cellulitis: Care Instructions. \" Current as of: April 1, 2019 Content Version: 12.2 © 1414-9665 [employer]. Care instructions adapted under license by [employer] (which disclaims liability or warranty for this information).  If you have questions about a medical condition or this instruction, always ask your healthcare professional. [employer] disclaims any warranty or liability for your use of this information.

## 2019-10-31 NOTE — PROGRESS NOTES
RM 1    Chief Complaint   Patient presents with   Aldana Fall     patients daughter reports patient fell in kitchen hit her head and got skin tear on Left shin, Patient did not go to ER     Ankle swelling     post fall      1. Have you been to the ER, urgent care clinic since your last visit? Hospitalized since your last visit? No    2. Have you seen or consulted any other health care providers outside of the [address] [place] [address] since your last visit? Include any pap smears or colon screening.  No    Health Maintenance Due   Topic Date Due    DTaP/Tdap/Td series (1 - Tdap) 05/25/1965    Shingrix Vaccine Age 50> (1 of 2) 05/25/1994       Learning Assessment 7/23/2019   PRIMARY LEARNER Patient   BARRIERS PRIMARY LEARNER -   PRIMARY LANGUAGE CHINESE (CANTONESE)   LEARNER PREFERENCE PRIMARY PICTURES   ANSWERED BY patient   RELATIONSHIP SELF

## 2019-11-01 ENCOUNTER — DOCUMENTATION ONLY (OUTPATIENT)
Dept: INTERNAL MEDICINE CLINIC | Age: 74
End: 2019-11-01

## 2019-11-01 NOTE — PROGRESS NOTES
Faxed completed Physician Order for P/T to [facility] on 11/1/19,faxed to # [phone] confirmation received placed in Centralized Scanning.

## 2019-11-20 ENCOUNTER — OFFICE VISIT (OUTPATIENT)
Dept: BEHAVIORAL/MENTAL HEALTH CLINIC | Age: 74
End: 2019-11-20

## 2019-11-20 VITALS
WEIGHT: 122 LBS | DIASTOLIC BLOOD PRESSURE: 73 MMHG | BODY MASS INDEX: 27.44 KG/M2 | SYSTOLIC BLOOD PRESSURE: 139 MMHG | HEIGHT: 56 IN | HEART RATE: 84 BPM

## 2019-11-20 DIAGNOSIS — F02.818 FRONTOTEMPORAL DEMENTIA WITH BEHAVIORAL DISTURBANCE (HCC): Primary | ICD-10-CM

## 2019-11-20 DIAGNOSIS — G31.09 FRONTOTEMPORAL DEMENTIA WITH BEHAVIORAL DISTURBANCE (HCC): Primary | ICD-10-CM

## 2019-11-20 DIAGNOSIS — G47.01 INSOMNIA DUE TO MEDICAL CONDITION: ICD-10-CM

## 2019-11-20 NOTE — PROGRESS NOTES
INITIAL PSYCHIATRIC EVALUATION    IDENTIFICATION:      A. Name: Marisela Hopper      B.   Age:     75 y.o.      C.   MRN: [id number]       D.   CSN:      [id number]      E.   :     1945          CC: Mya Gaming has been diagnosed with dementia\". HISTORY OF PRESENT ILLNESS:    The patient Marisela Hopper is a 75 y.o.  female with a history of frontotemporal dementia and insomnia. Pt was mostly nonverbal during this interview and history was provided by her daughter. She reports the following psychiatric symptoms:  agitation, confusion, difficulty sleeping, hallucinations, and behavioral disturbances. The symptoms have been present for years and are of moderate severity. The symptoms are chronic in nature. Additional symptoms include wandering, \"freezing in place\", and drowsiness. Symptoms are not precipitated by psychosocial stressors. Symptoms made worse when pt does not receive what she wants, for example when pt does not get food when she would like. Pt also has history of recurrent UTI's and get agitated/combative when she has UTI. Daughter has been monitoring her urine and stated that she is not combative when she does not have a UTI. Pt is currently on 25 mg Seroquel at bedtime. Daughter stated pt wanders at times during the night but that Seroquel helps with sleeping at night. Per daughter pt is drowsy and groggy during the day, and is not very active. Daughter is concerned about the patient \"freezing in place\", pt has increased weakness and requires a wheelchair for ambulation. Pt is not very stable on her feet, but has been standing up and staying in the same position for a long period of time. Daughter is afraid that pt might have a fall and is wondering if there is some medication that will help wither her \"freezing in place\" or being in the same position for a while. History of Present Illness per Dr Jo Conley 19: Marisela Hopper is a 75 y.o.  [facility] who presents with history of dementia, recurrent UTI, lower extremity edema, questionable diagnosis of Parkinson's disease, was seen today to establish her mental health treatment here. Patient was brought by her  and her daughter. Patient was recently seen by Dr. Taqueria Moon, neurologist in October 2018 and was diagnosed with Lewey Body Dementia based on her history of Parkinson's disease, which was never fully investigated. Most of the history was given by her daughter as patient is speaks very little [place].     Daughter reported that patient is starting to have some problem with her memory around 2016 which was not very significant. Slowly sleep started to get disturbed and significant mood fluctuation with aggressive behaviors. She was admitted to inpatient psychiatry at [facility] in April 2017 for 11 days and was discharged on the combination of Namenda and Haldol. [facility] [address] [facility]. In the program patient was seen by psych nurse practitioner Levon Prescott for few years, and was  tried her on many psych medications with little benefits. In last few years patient has been tried on Aricept, which caused her diarrhea and loss of appetite, Namenda, which was not fully titrated and was stopped. She was also tried on Zyprexa, Haldol, Risperdal, trazodone and lorazepam for variety of symptoms. At one time she was tried on Sinemet for PD, which she could not tolerate. Daughter does not know the details of why these medications were restarted or stopped. Daughter reported that  patient's biggest problem is not sleeping well and wandering at night. She also has significant mood fluctuation from being very happy to be extremely irritable and physically aggressive towards her  and caregivers. She has been hitting and scratching her  frequently in the evening. Patient does not yell but continues to mumble.   She is eating well and has put on some weight. Daughter was not clear whether patient has visual hallucinations or delusional thinking. Patient does not have any significant falls but does have urinary incontinence for long time. She has recurrent UTIs and has been admitted multiple times with symptoms of delirium. Most of her visual experiences has been around the UTI episodes. Patient is currently on low-dose of Seroquel, which is not making her sleep more than 2-3 hours per night. Patient is redirectable.     I saw the patient in the presence of her  and daughter. She was very bright and laughed inappropriately. She answered my questions, when it was translated by her daughter. She is alert and oriented to time, place and person. According to the daughter patient does not have significant memory problems but most of her difficulties are  her abrupt behavior changes. Later on patient was able to participate in brief cognitive testing and did reasonably well. Patient only has high school education from [place]. Patient never had any behavioral issues before 2016 and does not have any history of mental illness.     Patient has multiple medical problems including urinary incontinence, recurrent UTI, back pain, lower extremity edema and questionable diagnosis of Parkinson's disease. Though there is a diagnosis of shuffling gait, she walked perfectly fine with me.     MRI of head done on 2/24/2018 showed only mild diffuse cerebral atrophy.       PAST HISTORY:  Psychiatric:  Past Psychiatric Hospitalization:  Hospitalized \"once or twice\" 3 years ago for wandering and increased confusion   Past Outpatient Providers:  Saw Dr. Julius Babb, but denied seeing any other psychiatric doctors.  Daughter stated that pt has seen at least 2 neurologist for her symptoms in the past.   Past Psychiatric Medications: Aricept (cause diarrhea and loss of appetite), Namenda, Zyprexa, Haldol, Risperdal, trazodone, lorazepam, Sinemet, and Depakote in the past. Daughter was not able to recall if pt has been on other psychiatric medications. Currently on Seroquel 25 mg.    Medical:  Active Ambulatory Problems     Diagnosis Date Noted    Osteopenia     History of bladder infections     Cataract 02/09/2017    Dementia ([facility] [address] 04/11/2017    Shuffling gait 01/07/2019    Back pain 01/07/2019    Frontotemporal dementia with behavioral disturbance ([facility] [address] 01/08/2019    Recurrent UTI (urinary tract infection) 01/08/2019    IGT (impaired glucose tolerance) 09/26/2019    Peripheral polyneuropathy 09/26/2019    Recurrent falls 09/26/2019     Resolved Ambulatory Problems     Diagnosis Date Noted    Acute cystitis with hematuria 04/04/2017    Altered mental status 02/22/2018    UTI (urinary tract infection) 03/31/2018     No Additional Past Medical History     Substance Use:   Social History     Socioeconomic History    Marital status:      Spouse name: Not on file    Number of children: Not on file    Years of education: Not on file    Highest education level: Not on file   Tobacco Use    Smoking status: Never Smoker    Smokeless tobacco: Never Used   Substance and Sexual Activity    Alcohol use: No    Drug use: No   Social History Narrative    66 year vold  female admitted voluntarily for dementia. Pt lives with her daughter, who reports pt. Has been more confused, wandering and occasionally aggressive in the last 3 weeks. Her internist just changed the pt from haldol to zyprexa because the pt complained of sedation. Pt is to return home to her daughter after treatment. She is able to speak and understand limited [place]. Social:  Marital Status: was  for 50+ years. Children: 2 children (son who is 48years old and a daughter who takes care of her). Pt currently lives with her daughter, son in law, and her 23year old grandchild. Educational Level:  Finished high school in [place]. [place]ame of [place] family in .    Work History: worked in a [employer]. Stopped working 10 years ago. Legal History:denied  Pertinent Childhood History: Pt's father passed away when pt was 3 months old. Pt has 7 sibling and she is the youngest. Denied trauma in childhood. Substance use: denied any    Family:  Mother has history of dementia, denied any other history. [address] [place] [address] Street: 14/28 - [facility]    MEDICATIONS:  Current Outpatient Medications   Medication Sig Dispense Refill    omega 3-dha-epa-fish oil (FISH OIL) 100-160-1,000 mg cap Fish Oil      glucosamine HCl (GLUCOSAMINE, BULK,) powd glucosamine (bulk) powder      cranberry 500 mg capsule Take 500 mg by mouth daily.  calcium carbonate (CALCIUM 300 PO) Take  by mouth.  vitamin e (E GEMS) 100 unit capsule Take  by mouth daily.  cyanocobalamin (VITAMIN B-12) 100 mcg tablet Take 100 mcg by mouth daily.  Biotin 2,500 mcg cap Take  by mouth.  QUEtiapine (SEROQUEL) 25 mg tablet TAKE 1 TABLET BY MOUTH EVERY DAY AT NIGHT 30 Tab 3    melatonin 5 mg cap capsule Take 2 Caps by mouth nightly. 30 Cap 3    multivitamin (ONE A DAY) tablet Take 1 Tab by mouth daily. ALLERGIES:  No Known Allergies    REVIEW OF SYSTEMS:  Pt's daughter reports patient's mood is within normal limits with agitation periodically. Per daughter pt has \"good and bad days\"   All other Systems reviewed and are considered negative    MENTAL STATUS EXAM:    FINDINGS WITHIN NORMAL LIMITS (WNL) UNLESS OTHERWISE STATED BELOW:    Orientation confused, disorganized   Vital Signs (BP,Pulse, Temp) See below (reviewed)   Gait and Station Wheelchair bound   Abnormal Muscular Movements/Tone/Behavior No EPS, no Tardive Dyskinesia, no abnormal muscular movements; wnl tone   Relations cooperative and vague   General Appearance:  age appropriate and well dressed   Language No aphasia or dysarthria   Speech:  hypoverbal and increased latency of response.  Had very little response   Thought Processes Unable to fully assess   Thought Associations disorganized   Thought Content internally preoccupied    Suicidal Ideations none   Homicidal Ideations none   Mood:  Irritable    Affect:  constricted and odd demeanor   Memory recent  impaired   Memory remote:  impaired   Concentration/Attention:  impaired   Fund of Knowledge Fair/average   Insight:  poor   Reliability poor   Judgment:  poor     VITALS:     Visit Vitals  /73 (BP 1 Location: Left arm, BP Patient Position: Sitting)   Pulse 84   Ht 4' 8\" (1.422 m)   Wt 55.3 kg (122 lb)   BMI 27.35 kg/m²       PERTINENT DATA:  No visits with results within 2 Day(s) from this visit. Latest known visit with results is:   Office Visit on 07/11/2019   Component Date Value Ref Range Status    RPR 07/11/2019 Non Reactive  Non Reactive Final    Lyme Ab, IgG/IgM 07/11/2019 <0.91  0.00 - 0.90 ISR Final    Hemoglobin A1c 07/11/2019 6.0* 4.8 - 5.6 % Final    Estimated average glucose 07/11/2019 126  mg/dL Final    WBC 07/11/2019 6.4  3.4 - 10.8 x10E3/uL Final    RBC 07/11/2019 4.01  3.77 - 5.28 x10E6/uL Final    HGB 07/11/2019 12.2  11.1 - 15.9 g/dL Final    HCT 07/11/2019 37.9  34.0 - 46.6 % Final    MCV 07/11/2019 95  79 - 97 fL Final    MCH 07/11/2019 30.4  26.6 - 33.0 pg Final    MCHC 07/11/2019 32.2  31.5 - 35.7 g/dL Final    RDW 07/11/2019 14.0  12.3 - 15.4 % Final    PLATELET 24/65/2729 099  150 - 450 x10E3/uL Final       XR Results (most recent):  Results from Hospital Encounter encounter on 05/21/19   XR SWALLOW FUNC VIDEO    Narrative INDICATION: Dysphagia to liquids. Modified barium swallow was performed in conjunction with the department of  speech pathology. Patient swallowed thin barium, thickened barium, barium pudding and solids  without difficulty. There is aspiration of thin barium seen on rapid successive  swallowing, likely secondary to fatigue. There is aspiration of nectar thickened  liquid when administered with a straw, also likely secondary to fatigue.  There  is no aspiration of barium pudding or barium mixed with solids. There is no  significant pooling. Fluoroscopy time is 1.6 min. Air kerma is 6.524 mGy. Impression Aspiration of thin barium contrast and nectar thickened liquid when  administered with a straw. Aspiration may be secondary to fatigue. ASSESSMENT/PLAN:  The patient Tamanna Hopper is a 75 y.o.  female who presents at this time for treatment of the following diagnoses:    ICD-10-CM ICD-9-CM    1. Frontotemporal dementia with behavioral disturbance (HCC) G31.09 331.19     F02.81 294.11    2. Insomnia due to medical condition G47.01 327.01        Assessment:   Tamanna Pettit He is a 75 y.o. female and presents with agitation, confusion, difficulty sleeping, hallucinations, and behavioral disturbances. These symptoms have been ongoing since pt was diagnosed with FTD and are chronic in nature. Symptoms get exacerbated and worsen when pt has a UTI. Pt can get combative when she has a UTI, and can get irritable/agitated at times. Pt has had episodes of wandering in the night. Pt is sleeping during the night because of her Seroquel but is tired during the day and is not very active. Pt has difficulty ambulating on her own and has increased weakness. Pt's daughter stated that she \"freezes in place\" and does not move. Daughter is worried about pt having a fall and wonders if there is any medication that will help with her mobility and her staying in the same position. Pt is tolerating her current Seroquel and her symptoms have been pretty stable with this medication. Daughter was educated about the disease process and what to expect as her mother's dementia progresses. Educated about environmental modifications to prevent agitation, irritability, and reduce behavioral disturbances.  Educated about safety and will look to see if pt will benefit form other medications in the future, but will continue with the current treatment plan since pt is somewhat benefitting from it. Plan:  1. Medications:  Current Outpatient Medications   Medication Sig Dispense Refill    omega 3-dha-epa-fish oil (FISH OIL) 100-160-1,000 mg cap Fish Oil      glucosamine HCl (GLUCOSAMINE, BULK,) powd glucosamine (bulk) powder      cranberry 500 mg capsule Take 500 mg by mouth daily.  calcium carbonate (CALCIUM 300 PO) Take  by mouth.  vitamin e (E GEMS) 100 unit capsule Take  by mouth daily.  cyanocobalamin (VITAMIN B-12) 100 mcg tablet Take 100 mcg by mouth daily.  Biotin 2,500 mcg cap Take  by mouth.  QUEtiapine (SEROQUEL) 25 mg tablet TAKE 1 TABLET BY MOUTH EVERY DAY AT NIGHT 30 Tab 3    melatonin 5 mg cap capsule Take 2 Caps by mouth nightly. 30 Cap 3    multivitamin (ONE A DAY) tablet Take 1 Tab by mouth daily. The following regarding treatment was addressed with patient:   the risks and benefits of the proposed medication, instructions for management, education and risk factor reduction. The patient was given the opportunity to ask questions. Informed consent given to the use of the above medications. Adjust psychiatric medications as needed based upon diagnoses and response to treatment. Will continue with the current medication (Seroquel 25 mg at bedtime) and will consider medication changes in the future if needed. 2.  Review previous labs and medical tests in the EHR and or transferring hospital documents. I will continue to order blood tests/labs and diagnostic tests as deemed appropriate and review results as they become available (see orders for details). 3.  Review old psychiatric and medical records available in the EHR. I will order additional psychiatric records from other institutions/providers as appropriate. 4.  Gather additional collateral information as appropriate. Encouraged to go see PCP to manage her physical health. 5.  Supportive and/or Individual Therapy    6.  Provided supportive therapy and encouraged environmental modifications to help with pt's symptoms. Follow-up and Dispositions    · Return in about 3 months (around 2/20/2020).           STRENGTHS:  Supportive family  Stable place to live      SIGNED:    Maik Mariano NP  11/20/2019

## 2019-11-27 ENCOUNTER — OFFICE VISIT (OUTPATIENT)
Dept: SLEEP MEDICINE | Age: 74
End: 2019-11-27

## 2019-11-27 VITALS
HEART RATE: 87 BPM | BODY MASS INDEX: 27.44 KG/M2 | SYSTOLIC BLOOD PRESSURE: 161 MMHG | WEIGHT: 122 LBS | HEIGHT: 56 IN | DIASTOLIC BLOOD PRESSURE: 80 MMHG | OXYGEN SATURATION: 98 %

## 2019-11-27 DIAGNOSIS — G47.419 NARCOLEPSY WITHOUT CATAPLEXY: Primary | ICD-10-CM

## 2019-11-27 NOTE — PROGRESS NOTES
[address] [facility] [place]. [place], [address]  Tel.  [phone]  Fax. [address] [facility] [address]  [place], [address] [facility]  Tel.  [phone]  Fax. [phone] [address]  Tel.  [phone]  Fax. [phone]         Subjective:      Rayne How He is an 75 y.o. female referred for evaluation for a sleep disorder. She complains of excessive daytime sleepiness associated with falling asleep while eating, watching television, during conversation in the toilet and in the car as a passenger. Symptoms began 3 years ago, gradually worsening since that time. She usually gets in bed between 10:00 pm and midnight and falls asleep in 20 -30 minutes on most night, occasional she will need melatonin 10 mg and Seroquel 25 mg to help her fall asleep and maintain sleep. She will sleep for 5-6 hours and will wake up for breakfast but will fall asleep by 10:00 am.  Family or house members do not note snoring. She denies completely or partially paralyzed while falling asleep or waking up. Rayne How He does wake up frequently at night. She is bothered by waking up too early and left unable to get back to sleep. She actually sleeps about 2 hours at night and wakes up about 6 times during the night. She does not work shifts: .   Kristina Tompkins indicates she does get too little sleep at night. Her bedtime is 2300. She awakens at 0300. She does take naps. She takes 4 naps a week lasting 10 to 15, 15 to 30, Minute(s). She has the following observed behaviors: Loud snoring, Light snoring;  . Other remarks:  She has been referred by neurology for testing and assessment of her sleepiness. Beeler Sleepiness Score: 21 which reflect severe daytime drowsiness.     No Known Allergies      Current Outpatient Medications:     omega 3-dha-epa-fish oil (FISH OIL) 100-160-1,000 mg cap, Fish Oil, Disp: , Rfl:     glucosamine HCl (GLUCOSAMINE, BULK,) powd, glucosamine (bulk) powder, Disp: , Rfl:     cranberry 500 mg capsule, Take 500 mg by mouth daily. , Disp: , Rfl:     calcium carbonate (CALCIUM 300 PO), Take  by mouth., Disp: , Rfl:     vitamin e (E GEMS) 100 unit capsule, Take  by mouth daily. , Disp: , Rfl:     cyanocobalamin (VITAMIN B-12) 100 mcg tablet, Take 100 mcg by mouth daily. , Disp: , Rfl:     Biotin 2,500 mcg cap, Take  by mouth., Disp: , Rfl:     QUEtiapine (SEROQUEL) 25 mg tablet, TAKE 1 TABLET BY MOUTH EVERY DAY AT NIGHT, Disp: 30 Tab, Rfl: 3    melatonin 5 mg cap capsule, Take 2 Caps by mouth nightly., Disp: 30 Cap, Rfl: 3    multivitamin (ONE A DAY) tablet, Take 1 Tab by mouth daily. , Disp: , Rfl:      She  has a past medical history of Dementia ([facility] [address] History of bladder infections, and Osteopenia. She  has a past surgical history that includes pr abdomen surgery proc unlisted (1995). She family history is not on file. She  reports that she has never smoked. She has never used smokeless tobacco. She reports that she does not drink alcohol or use drugs. Review of Systems:  Constitutional:  No significant weight loss or weight gain  Eyes:  No blurred vision  CVS:  No significant chest pain  Pulm:  No significant shortness of breath  GI:  No significant nausea or vomiting  :  No significant nocturia  Musculoskeletal:  No significant joint pain at night  Skin:  No significant rashes  Neuro:  No significant dizziness   Psych:  No active mood issues    Sleep Review of Systems: notable for difficulty falling asleep; frequent awakenings at night;  regular dreaming noted; no nightmares ; no early morning headaches; no memory problems; no concentration issues; no history of any automobile or occupational accidents due to daytime drowsiness.       Objective:     Visit Vitals  /80 (BP 1 Location: Right arm, BP Patient Position: Sitting)   Pulse 87   Ht 4' 8\" (1.422 m)   Wt 122 lb (55.3 kg)   SpO2 98%   BMI 27.35 kg/m²         General:   Not in acute distress   Eyes:  Anicteric sclerae, no obvious strabismus   Nose:  No obvious nasal septum deviation    Oropharynx:   Class 4 oropharyngeal outlet, thick tongue base, uvula could not be seen due to low-lying soft palate, narrow tonsilo-pharyngeal pilars   Tonsils:   tonsils are not seen due to low-lying soft palate   Neck:   Neck circ. in \"inches\": 15; midline trachea   Chest/Lungs:  Equal lung expansion, clear on auscultation    CVS:  Normal rate, regular rhythm; no JVD   Skin:  Warm to touch; no obvious rashes   Neuro:  No focal deficits ; no obvious tremor    Psych:  Normal affect,  normal countenance;          Assessment:       ICD-10-CM ICD-9-CM    1. Narcolepsy without cataplexy G47.419 347.00 7-DRUG SCREEN, UR      POLYSOMNOGRAPHY 1 NIGHT      MULTIPLE SLEEP LATENCY TEST         Plan:       * The patient currently has a Minimal risk for having sleep apnea. STOP-BANG score 2.  * Since secondary narcolepsy may present in this manner testing is advised. * PSG / MSLT was ordered for initial evaluation of sleep physiology and hypersomnia. Testing protocol including need for urine drug screen reviewed. * The need for cancelling the daytime testing in the event of certain night time findings was discussed. * Effect of medications on testing was discussed. * She has agreed to withhold Seroquel and melatonin for about 1 week prior to testing. * She has agreed to maintain a regular sleep schedule and practice good sleep hygiene. * Patient is aware of the need to not start any new medications until testing is completed. * Patient is aware of the need to avoid driving or performing tasks requiring a high degree of vigilance in the presence of unintentional sleep attacks. * She has agreed to follow-up shortly after sleep testing to go over results and to discuss treatment options if indicated.     * Telephone [phone]  follow-up shortly after sleep study to review results and plan final management.     (patient has given permission for a message to be left regarding test results and further management if patient cannot be cannot be reached directly). Thank you for allowing us to participate in your patient's medical care. We'll keep you updated on these investigations. Nick Goodman MD, FAASM  Electronically signed.  11/27/19

## 2019-11-27 NOTE — PATIENT INSTRUCTIONS
[address] [place]. [place], [address]  Tel.  [phone]  Fax. [address]  [place], [address]  Tel.  [phone]  Fax. [phone] [address] Sujatha Steen  Tel.  [phone]  Fax. [phone]       Narcolepsy: After Your Visit  Your Care Instructions  Everybody gets a little sleepy once in a while, during a long car ride or other times when you want to be alert. But some people cannot control their sleepiness. It is no fun to be in the middle of your workday or driving your car down the street and have an overwhelming desire to sleep. This condition is called narcolepsy. Doctors do not know what causes narcolepsy. Your doctor may ask you to keep a sleep diary for a couple of weeks. It will help you and your doctor decide on treatment. It often helps to take limited naps during the day. It also helps to create a good mood and place for nighttime sleep. Your doctor may recommend medicine to help you stay awake during the day or sleep at night. Follow-up care is a key part of your treatment and safety. Be sure to make and go to all appointments, and call your doctor if you are having problems. It's also a good idea to know your test results and keep a list of the medicines you take. How can you care for yourself at home? · Try to take 2 or 3 short naps at regular times during the day. After a nap, always give yourself time to become alert before you drive a car or do anything that might cause an accident. · Take your medicines exactly as prescribed. Call your doctor if you think you are having a problem with your medicine. You may need to try several medicines before you find the one that works best for you. · Try to improve your nighttime sleep habits. Here are a few of the things you could do:  ¨ Go to bed only when you are sleepy, and get up at the same time every day, even if you do not feel rested.  This might help you sleep well the next night and the night after that. ¨ If you lie awake for longer than 15 minutes, get up, leave the bedroom, and do something quiet, such as read, until you feel sleepy again. ¨ Avoid drinking or eating anything with caffeine after 3 p.m. This includes coffee, tea, cola drinks, and chocolate. ¨ Make sure your bedroom is not too hot or too cold, and keep it quiet and dark. ¨ Make sure your mattress provides good support. · Be kind to your body:  ¨ Relieve tension with exercise or a massage. ¨ Learn and do relaxation techniques. ¨ Avoid alcohol, caffeine, nicotine, and illegal drugs. They can increase your anxiety level and cause sleep problems. · Get light exercise daily. Gentle stretching, light aerobics, swimming, walking, and riding a bicycle can help to keep you going during the day and to sleep well at night. · Eat a healthy diet. You may feel better if you avoid heavy meals and eat more fruits and vegetables. · Do not use over-the-counter sleeping pills. They can make your sleep restless. · Ask your doctor if any medicines you take could cause sleepiness. For example, cold and allergy medicines can make you drowsy. · Consider joining a support group with people who have narcolepsy or other sleep problems. These groups can be a good source of tips for what to do. Also, it can be comforting to talk to people who face similar challenges. Your doctor can tell you how to contact a support group. When should you call for help? Call your doctor now or seek immediate medical care if:  · You passed out (lost consciousness). · You cannot use your muscles. This may happen very briefly, sometimes after you laugh or are angry, and may only affect part of your body. Watch closely for changes in your health, and be sure to contact your doctor if:  · Your sleepiness continues to get worse. Where can you learn more?    Go to VoiceBunny.be  Enter V069 in the search box to learn more about \"Narcolepsy: After Your Visit. \"   © 5125-5884 Healthwise, [employer]orporated. Care instructions adapted under license by [facility] (which disclaims liability or warranty for this information). This care instruction is for use with your licensed healthcare professional. If you have questions about a medical condition or this instruction, always ask your healthcare professional. [address] any warranty or liability for your use of this information. Content Version: 9.0.56170; Last Revised: September 15, 2009  PROPER SLEEP HYGIENE    What to avoid  · Do not have drinks with caffeine, such as coffee or black tea, for 8 hours before bed. · Do not smoke or use other types of tobacco near bedtime. Nicotine is a stimulant and can keep you awake. · Avoid drinking alcohol late in the evening, because it can cause you to wake in the middle of the night. · Do not eat a big meal close to bedtime. If you are hungry, eat a light snack. · Do not drink a lot of water close to bedtime, because the need to urinate may wake you up during the night. · Do not read or watch TV in bed. Use the bed only for sleeping and sexual activity. What to try  · Go to bed at the same time every night, and wake up at the same time every morning. Do not take naps during the day. · Keep your bedroom quiet, dark, and cool. · Get regular exercise, but not within 3 to 4 hours of your bedtime. .  · Sleep on a comfortable pillow and mattress. · If watching the clock makes you anxious, turn it facing away from you so you cannot see the time. · If you worry when you lie down, start a worry book. Well before bedtime, write down your worries, and then set the book and your concerns aside. · Try meditation or other relaxation techniques before you go to bed. · If you cannot fall asleep, get up and go to another room until you feel sleepy. Do something relaxing.  Repeat your bedtime routine before you go to bed again.  · Make your house quiet and calm about an hour before bedtime. Turn down the lights, turn off the TV, log off the computer, and turn down the volume on music. This can help you relax after a busy day. Drowsy Driving: The [facility] cites drowsiness as a causing factor in more than 810,419 police reported crashes annually, resulting in 76,000 injuries and 1,500 deaths. Other surveys suggest 55% of people polled have driven while drowsy in the past year, 23% had fallen asleep but not crashed, 3% crashed, and 2% had and accident due to drowsy driving. Who is at risk? Young Drivers: One study of drowsy driving accidents states that 55% of the drivers were under 25 years. Of those, 75% were male. Shift Workers and Travelers: People who work overnight or travel across time zones frequently are at higher risk of experiencing Circadian Rhythm Disorders. They are trying to work and function when their body is programed to sleep. Sleep Deprived: Lack of sleep has a serious impact on your ability to pay attention or focus on a task. Consistently getting less than the average of 8 hours your body needs creates partial or cumulative sleep deprivation. Untreated Sleep Disorders: Sleep Apnea, Narcolepsy, R.L.S., and other sleep disorders (untreated) prevent a person from getting enough restful sleep. This leads to excessive daytime sleepiness and increases the risk for drowsy driving accidents by up to 7 times. Medications / Alcohol: Even over the counter medications can cause drowsiness. Medications that impair a drivers attention should have a warning label. Alcohol naturally makes you sleepy and on its own can cause accidents. Combined with excessive drowsiness its effects are amplified.    Signs of Drowsy Driving:   * You don't remember driving the last few miles   * You may drift out of your demi   * You are unable to focus and your thoughts wander   * You may yawn more often than normal   * You have difficulty keeping your eyes open / nodding off   * Missing traffic signs, speeding, or tailgating  Prevention-   Good sleep hygiene, lifestyle and behavioral choices have the most impact on drowsy driving. There is no substitute for sleep and the average person requires 8 hours nightly. If you find yourself driving drowsy, stop and sleep. Consider the sleep hygiene tips provided during your visit as well. Medication Refill Policy: Refills for all medications require 1 week advance notice. Please have your pharmacy fax a refill request. We are unable to fax, or call in \"controled substance\" medications and you will need to pick these prescriptions up from our office. [email or web address] Activation    Thank you for requesting access to [facility]. Please follow the instructions below to securely access and download your online medical record. [email or web address] allows you to send messages to your doctor, view your test results, renew your prescriptions, schedule appointments, and more. How Do I Sign Up? 1. In your internet browser, go to [email or web address]. 2. Click on the First Time User? Click Here link in the Sign In box. You will see the New Member Sign Up page. 3. Enter your [facility] Access Code exactly as it appears below. You will not need to use this code after youve completed the sign-up process. If you do not sign up before the expiration date, you must request a new code. [facility] Access Code: [email or web address]  Expires: 2020  2:59 PM (This is the date your [facility] access code will )    4. Enter the last four digits of your Social Security Number (xxxx) and Date of Birth (mm/dd/yyyy) as indicated and click Submit. You will be taken to the next sign-up page. 5. Create a [facility] ID. This will be your [facility] login ID and cannot be changed, so think of one that is secure and easy to remember. 6. Create a [facility] password.  You can change your password at any time. 7. Enter your Password Reset Question and Answer. This can be used at a later time if you forget your password. 8. Enter your e-mail address. You will receive e-mail notification when new information is available in [address] 9. Click Sign Up. You can now view and download portions of your medical record. 10. Click the Download Summary menu link to download a portable copy of your medical information. Additional Information    If you have questions, please call [phone]. Remember, [employer] is NOT to be used for urgent needs. For medical emergencies, dial 911.

## 2020-01-16 NOTE — TELEPHONE ENCOUNTER
Spoke with pt daughter, after verifying pt name and . Daughter stated that pt heart rate per NP that came out yesterday was high 130. Daughter stated pt is feeling fine today no concerns but wanted to make Dr. Clare Rider aware.            Please see previous message and advise , thank you nonverbal cues (demo/gestures)/verbal cues/1 person assist

## 2020-02-24 ENCOUNTER — OFFICE VISIT (OUTPATIENT)
Dept: BEHAVIORAL/MENTAL HEALTH CLINIC | Age: 75
End: 2020-02-24

## 2020-02-24 VITALS
SYSTOLIC BLOOD PRESSURE: 139 MMHG | HEART RATE: 100 BPM | DIASTOLIC BLOOD PRESSURE: 74 MMHG | WEIGHT: 120 LBS | BODY MASS INDEX: 26.99 KG/M2 | HEIGHT: 56 IN

## 2020-02-24 DIAGNOSIS — G31.09 FRONTOTEMPORAL DEMENTIA WITH BEHAVIORAL DISTURBANCE (HCC): Primary | ICD-10-CM

## 2020-02-24 DIAGNOSIS — G47.01 INSOMNIA DUE TO MEDICAL CONDITION: ICD-10-CM

## 2020-02-24 DIAGNOSIS — F02.818 FRONTOTEMPORAL DEMENTIA WITH BEHAVIORAL DISTURBANCE (HCC): Primary | ICD-10-CM

## 2020-02-24 NOTE — PROGRESS NOTES
CHIEF COMPLAINT:  Dennise Kendrick He is a 75 y.o. female and was seen today for follow-up of psychiatric condition and psychotropic medication management. HPI:    Eddie Pena reports the following psychiatric symptoms:  dementia. The symptoms have been present for months and are of moderate/high severity. The symptoms occur daily. Pt being seen today for the second time. She transitioned to my caseload 11/20/19. Pt is non-verbal during session. She is here today with her daughter who reports problem with walking/moving her leg is the most pressing issue. She shared that she has not been using seroquel nightly for sleep for her mom. Her last use was approx 1 month ago. They are here today to review current treatment plan.        FAMILY/SOCIAL HX: supportive family    REVIEW OF SYSTEMS:  Psychiatric:  frontotemporal dementia  Appetite:good   Sleep: poor with DIMS (difficulty initiating & maintaining sleep), up frequently during night for bathroom, sleeps frequently during the daytime, asleep throughout much of session today   Neuro: neuropathy, shuffling gait approx 1 yr ago, dementia    Visit Vitals  /74 (BP 1 Location: Left arm, BP Patient Position: Sitting)   Pulse 100   Ht 4' 8\" (1.422 m)   Wt 54.4 kg (120 lb)   BMI 26.90 kg/m²       Side Effects:  none    MENTAL STATUS EXAM:   Sensorium  disoriented   Relations non-communicative except for occasional smile, some eye contact   Appearance:  age appropriate and casually dressed   Motor Behavior:  gait unsteady and in wheelchair, daughter reports stiff movement and problems with gait   Speech:  aphasia, daughter reports some single word answers at home   Thought Process: blocked   Thought Content free of delusions and free of hallucinations   Suicidal ideations no intention   Homicidal ideations no intention   Mood:  within normal limits   Affect:  Wnl, has some episodes of agitation/sundowning   Memory recent  impaired   Memory remote:  impaired   Concentration: impaired   Abstraction:  concrete   Insight:  limited and poor   Reliability poor   Judgment:  poor     MEDICAL DECISION MAKING:  Problems addressed today:    ICD-10-CM ICD-9-CM    1. Frontotemporal dementia with behavioral disturbance (HCC) G31.09 331.19     F02.81 294.11    2. Insomnia due to medical condition G47.01 327.01        Assessment:   Bethel is not fully responding to treatment. She has been on seroquel for quite some time prior to transition of care but daughter shared today she has not used it in approx a month or so. She uses PRN for agitation and sleep primarily. As noted above her chief c/o is problems related to stiff leg/gait issues. Discussed likelihood this is progression of disease. Reviewed possibility of blocked dopamine from use of seroquel but as noted pt's family does not use nightly. Informed her a neuro assessment would be beneficial for these motor complaints. Will wait for neuro assessment and make changes as needed. Discussed possibility of switching medication for behavioral episodes to something else. Will review neuro findings at next office visit and make changes at that time. Plan:   1. Current Outpatient Medications   Medication Sig Dispense Refill    omega 3-dha-epa-fish oil (FISH OIL) 100-160-1,000 mg cap Fish Oil      glucosamine HCl (GLUCOSAMINE, BULK,) powd glucosamine (bulk) powder      cranberry 500 mg capsule Take 500 mg by mouth daily.  calcium carbonate (CALCIUM 300 PO) Take  by mouth.  vitamin e (E GEMS) 100 unit capsule Take  by mouth daily.  cyanocobalamin (VITAMIN B-12) 100 mcg tablet Take 100 mcg by mouth daily.  Biotin 2,500 mcg cap Take  by mouth.  QUEtiapine (SEROQUEL) 25 mg tablet TAKE 1 TABLET BY MOUTH EVERY DAY AT NIGHT 30 Tab 3    melatonin 5 mg cap capsule Take 2 Caps by mouth nightly. 30 Cap 3    multivitamin (ONE A DAY) tablet Take 1 Tab by mouth daily.             medication changes made today: seroquel 25 mg nightly as needed    2. Counseling and coordination of care including instructions for treatment, risks/benefits, risk factor reduction and patient/family education. She agrees with the plan. Patient instructed to call with any side effects, questions or issues.      3.    Follow-up and Dispositions    · Return in about 3 months (around 5/24/2020).         2/24/2020  Joaquin Walsh NP

## 2020-02-25 ENCOUNTER — OFFICE VISIT (OUTPATIENT)
Dept: NEUROLOGY | Age: 75
End: 2020-02-25

## 2020-02-25 VITALS
WEIGHT: 130 LBS | BODY MASS INDEX: 29.25 KG/M2 | OXYGEN SATURATION: 97 % | DIASTOLIC BLOOD PRESSURE: 82 MMHG | SYSTOLIC BLOOD PRESSURE: 138 MMHG | HEIGHT: 56 IN | HEART RATE: 85 BPM

## 2020-02-25 DIAGNOSIS — F03.90 DEMENTIA WITHOUT BEHAVIORAL DISTURBANCE, UNSPECIFIED DEMENTIA TYPE: ICD-10-CM

## 2020-02-25 DIAGNOSIS — F02.818 OTHER FRONTOTEMPORAL DEMENTIA WITH BEHAVIORAL DISTURBANCE: ICD-10-CM

## 2020-02-25 DIAGNOSIS — Z78.9 IMPAIRED MOBILITY AND ADLS: ICD-10-CM

## 2020-02-25 DIAGNOSIS — Z74.09 IMPAIRED MOBILITY AND ADLS: ICD-10-CM

## 2020-02-25 DIAGNOSIS — G31.09 FRONTO-TEMPORAL DEMENTIA (HCC): Primary | ICD-10-CM

## 2020-02-25 DIAGNOSIS — G31.09 OTHER FRONTOTEMPORAL DEMENTIA WITH BEHAVIORAL DISTURBANCE: ICD-10-CM

## 2020-02-25 DIAGNOSIS — F02.80 FRONTO-TEMPORAL DEMENTIA (HCC): Primary | ICD-10-CM

## 2020-02-25 DIAGNOSIS — R48.2 MOTOR APRAXIA: ICD-10-CM

## 2020-02-25 DIAGNOSIS — R48.2 GAIT APRAXIA: ICD-10-CM

## 2020-02-25 RX ORDER — DIVALPROEX SODIUM 125 MG/1
TABLET, DELAYED RELEASE ORAL
COMMUNITY
End: 2020-02-25

## 2020-02-25 RX ORDER — DONEPEZIL HYDROCHLORIDE 5 MG/1
5 TABLET, FILM COATED ORAL
Qty: 30 TAB | Refills: 3 | Status: SHIPPED | OUTPATIENT
Start: 2020-02-25 | End: 2020-05-26

## 2020-02-25 RX ORDER — FUROSEMIDE 20 MG/1
TABLET ORAL
COMMUNITY
End: 2020-02-25

## 2020-02-25 RX ORDER — CEPHALEXIN 500 MG/1
CAPSULE ORAL
COMMUNITY
Start: 2020-01-28 | End: 2020-02-25

## 2020-02-25 RX ORDER — CEFDINIR 300 MG/1
CAPSULE ORAL
COMMUNITY
End: 2020-02-25

## 2020-02-25 RX ORDER — CEPHALEXIN 250 MG/1
CAPSULE ORAL
COMMUNITY
End: 2020-02-25

## 2020-02-25 RX ORDER — ESTRADIOL 0.1 MG/G
CREAM VAGINAL
COMMUNITY
End: 2020-02-25

## 2020-02-25 RX ORDER — SULFAMETHOXAZOLE AND TRIMETHOPRIM 800; 160 MG/1; MG/1
TABLET ORAL
COMMUNITY
End: 2020-02-25

## 2020-02-25 RX ORDER — CEPHALEXIN 500 MG/1
CAPSULE ORAL
COMMUNITY
End: 2020-02-25

## 2020-02-25 RX ORDER — DOXYCYCLINE 25 MG/5ML
POWDER, FOR SUSPENSION ORAL
COMMUNITY
End: 2020-02-25

## 2020-02-25 RX ORDER — NITROFURANTOIN 25; 75 MG/1; MG/1
CAPSULE ORAL
COMMUNITY
End: 2020-02-25

## 2020-02-25 RX ORDER — QUETIAPINE FUMARATE 50 MG/1
TABLET, FILM COATED ORAL
COMMUNITY
End: 2020-05-05 | Stop reason: CLARIF
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
## 2020-02-25 NOTE — PATIENT INSTRUCTIONS
Office Policies    o Phone calls/patient messages:  Please allow up to 24 hours for someone in the office to contact you about your call or message. Be mindful your provider may be out of the office or your message may require further review. We encourage you to use [employer] for your messages as this is a faster, more efficient way to communicate with our office    o Medication Refills:  Prescription medications require up to 48 business hours to process. We encourage you to use [employer] for your refills. For controlled medications: Please allow up to 72 business hours to process. Certain medications may require you to  a written prescription at our office. NO narcotic/controlled medications will be prescribed after 4pm Monday through Friday or on weekends    o Form/Paperwork Completion:  We ask that you allow 7-14 business days. You may also download your forms to [employer] to have your doctor print off. Patient has dementia. She has a unique type called frontotemporal dementia. Regardless of the type of dementia she has this is not something that will get better and it will only get worse with time. I like to place her on Aricept also known as donepezil. We will put her on low-dose 5 mg 1 daily. The goal of this treatment is to help slow down the disease process. It generally does not improve the current situation and patients generally do not get better from taking the medicationWhen I see you back if she is tolerating it I like to increase her to 10 mg daily. Because of the dementia she is having difficulty knowing how to walk, how to sit, how to use utensils and that type of thing. I recommend doing some research.   Please go to reputable websites such as [facility], [address] [place] [address], [facility] etc. this will give you guidance and understanding the disease process and realistic expectations    We will see you back in 3 months sooner if there are problems

## 2020-04-22 ENCOUNTER — TELEPHONE (OUTPATIENT)
Dept: INTERNAL MEDICINE CLINIC | Age: 75
End: 2020-04-22

## 2020-04-22 NOTE — TELEPHONE ENCOUNTER
Received DME order for nutrional supplement. Please schedule virtual visit with patient weight so we can accurately complete this order.      Cassie Jeffery MD

## 2020-04-28 ENCOUNTER — HOME HEALTH ADMISSION (OUTPATIENT)
Dept: HOME HEALTH SERVICES | Facility: HOME HEALTH | Age: 75
End: 2020-04-28
Payer: MEDICARE

## 2020-04-28 ENCOUNTER — VIRTUAL VISIT (OUTPATIENT)
Dept: INTERNAL MEDICINE CLINIC | Age: 75
End: 2020-04-28

## 2020-04-28 DIAGNOSIS — G31.09 FRONTOTEMPORAL DEMENTIA WITH BEHAVIORAL DISTURBANCE (HCC): ICD-10-CM

## 2020-04-28 DIAGNOSIS — N39.0 RECURRENT UTI (URINARY TRACT INFECTION): Primary | ICD-10-CM

## 2020-04-28 DIAGNOSIS — R26.89 SHUFFLING GAIT: ICD-10-CM

## 2020-04-28 DIAGNOSIS — N39.46 MIXED STRESS AND URGE URINARY INCONTINENCE: ICD-10-CM

## 2020-04-28 DIAGNOSIS — R26.89 DECREASED MOBILITY: ICD-10-CM

## 2020-04-28 DIAGNOSIS — S81.812A SKIN TEAR OF LEFT LOWER LEG WITHOUT COMPLICATION, INITIAL ENCOUNTER: ICD-10-CM

## 2020-04-28 DIAGNOSIS — F02.818 FRONTOTEMPORAL DEMENTIA WITH BEHAVIORAL DISTURBANCE (HCC): ICD-10-CM

## 2020-04-28 DIAGNOSIS — R29.6 RECURRENT FALLS: ICD-10-CM

## 2020-04-28 RX ORDER — MIRABEGRON 25 MG/1
25 TABLET, FILM COATED, EXTENDED RELEASE ORAL DAILY
COMMUNITY
Start: 2020-04-27

## 2020-04-28 RX ORDER — NITROFURANTOIN (MACROCRYSTALS) 100 MG/1
100 CAPSULE ORAL 2 TIMES DAILY
COMMUNITY
End: 2020-04-28

## 2020-04-28 RX ORDER — CEPHALEXIN 250 MG/1
250 CAPSULE ORAL
Qty: 30 CAP | Refills: 0 | Status: SHIPPED | OUTPATIENT
Start: 2020-04-28 | End: 2020-12-22

## 2020-04-28 RX ORDER — NITROFURANTOIN 25; 75 MG/1; MG/1
100 CAPSULE ORAL 2 TIMES DAILY
Qty: 10 CAP | Refills: 0 | Status: SHIPPED | OUTPATIENT
Start: 2020-04-28 | End: 2020-05-26 | Stop reason: ALTCHOICE

## 2020-04-28 NOTE — PATIENT INSTRUCTIONS
I am glad we were able to meet earlier today. Please call and schedule your follow-up appointment soon so you are able to get the date and time you need. I recommend:  
- plan to continue 1 time daily keflex antibiotic at this time. - after we have culture results data, we will send to Dr. Lorna Maynard. Please be sure to scheduled follow-up with urology. Please do not hesitate to contact the office if any new questions or concerns. All the best,  
  
   Dr. Lux Echeverria

## 2020-04-28 NOTE — PROGRESS NOTES
Patient accompanied by her daughter who is assisting with translation     Chief Complaint   Patient presents with    Form Completion     nutrition supplment form completion recieved in fax     Bladder Infection     reports had uti 1 month ago but when finished antibiotic symptoms returned - reports incontinence, urine leaking,      1. Have you been to the ER, urgent care clinic since your last visit? Hospitalized since your last visit? No    2. Have you seen or consulted any other health care providers outside of the [address] [place] [address] since your last visit? Include any pap smears or colon screening.  No    Health Maintenance Due   Topic Date Due    DTaP/Tdap/Td series (1 - Tdap) 05/25/1965    Shingrix Vaccine Age 50> (1 of 2) 05/25/1994       Learning Assessment 11/27/2019   PRIMARY LEARNER Patient   BARRIERS PRIMARY LEARNER -   PRIMARY LANGUAGE ENGLISH   LEARNER PREFERENCE PRIMARY DEMONSTRATION   ANSWERED BY patient   RELATIONSHIP SELF

## 2020-04-28 NOTE — PROGRESS NOTES
Damion Tovar He is a 75 y.o. female who was seen by synchronous (real-time) audio-video technology on 4/28/2020. Pursuant to the emergency declaration under the [address] and [place] [address] [facility] authority and the [employer] Act, this Virtual Visit was conducted, with patient's consent, to reduce the patient's risk of exposure to COVID-19 and provide continuity of care for an established patient. Services were provided through a video synchronous discussion virtually to substitute for in-person appointment. Consent:  She and/or her healthcare decision maker is aware that this patient-initiated Telehealth encounter is a billable service, with coverage as determined by her insurance carrier. She is aware that she may receive a bill and has provided verbal consent to proceed: Yes    I was in the office while conducting this encounter. Family speaks mandarin. Due to dementia, mother is not able to use  phone.  and daughter provide translation. Assessment & Plan:   Diagnoses and all orders for this visit:    1. Recurrent UTI (urinary tract infection)  -     CULTURE, URINE  -     URINALYSIS W/ RFLX MICROSCOPIC  -     nitrofurantoin, macrocrystal-monohydrate, (MACROBID) 100 mg capsule; Take 1 Cap by mouth two (2) times a day. -     cephALEXin (KEFLEX) 250 mg capsule; Take 1 Cap by mouth nightly. Start after completing other antibiotic. 2. Decreased mobility  -     REFERRAL TO HOME HEALTH    3. Frontotemporal dementia with behavioral disturbance ([facility] [address]  -     [address]    4. Shuffling gait  -     REFERRAL TO HOME HEALTH    5. Recurrent falls  -     [address]    6. Mixed stress and urge urinary incontinence  -     REFERRAL TO HOME HEALTH    7.  Skin tear of left lower leg without complication, initial encounter      #1: daughter will try to collect specimen for urine prior to starting antibiotics. Reviewed Dr. Waldo Bonilla note from 3/3/2020. Plan was for patient to take 1 time daily 250mg keflex for daily suppression of abx. Treat with 5 days macrobid then start this. #2-#5 - patient with long standing and progressive mobility issues related to dementia. She high risk for fall due to shuffling gait and debility. Daughter is now noting increased difficulty with transfers while bathing, and at other times in the home.    - OT/PT requested via home health. Gaining weight: increase in appetite on new medications. will stop nutirtional supplement (fax received for certification of supplements promted this visit). Will fax back with discontinue order. 712  Subjective:   Thong Cisneros He was seen for Form Completion (nutrition supplment form completion recieved in fax ) and Bladder Infection (reports had uti 1 month ago but when finished antibiotic symptoms returned - reports incontinence, urine leaking, )    Reports small wound on left anterior leg.    - daughter has been dressing with antibiotic cream and bandage. Now with a hard covering, appears to be healing according to daughter. Instead of having her soak in shower bath and shower. Keeping leg dry. Mother is not reporting any pain around wound. Has been working with neurologist. Including medicine to help her walk. However continues to have difficulty with this. Continue to report difficult transporting mother. Urine: continues to have day and night  Urinary leakage. Had appointment with Dr. Ale Rosenthal in early march. Record reflects she was advised to take myrabetriq, cranberry, and 1 time daily keflex. Also with concern for urinary tract infection. 2 medications    Reports she has been gaining weight. This is concerning for reina as it is becoming harder to care for her. Prior to Admission medications    Medication Sig Start Date End Date Taking?  Authorizing Provider   Myrbetriq 25 mg ER tablet  4/27/20  Yes Provider, Historical   nitrofurantoin, macrocrystal-monohydrate, (MACROBID) 100 mg capsule Take 1 Cap by mouth two (2) times a day. 4/28/20  Yes Cristo Rider MD   cephALEXin [facility]) 250 mg capsule Take 1 Cap by mouth nightly. Start after completing other antibiotic. 4/28/20  Yes Cristo Rider MD   donepeziL (ARICEPT) 5 mg tablet Take 1 Tab by mouth nightly. 2/25/20  Yes Major Avery NP   omega 3-dha-epa-fish oil (FISH OIL) 100-160-1,000 mg cap Fish Oil   Yes Provider, Historical   cranberry 500 mg capsule Take 500 mg by mouth daily. Yes Provider, Historical   calcium carbonate (CALCIUM 300 PO) Take  by mouth. Yes Provider, Historical   nitrofurantoin (MACRODANTIN) 100 mg capsule Take 100 mg by mouth two (2) times a day. 4/28/20  Provider, Historical   QUEtiapine (SEROQUEL) 50 mg tablet quetiapine 50 mg tablet    Provider, Historical   vitamin e (E GEMS) 100 unit capsule Take  by mouth daily. Provider, Historical   cyanocobalamin (VITAMIN B-12) 100 mcg tablet Take 100 mcg by mouth daily. Provider, Historical   Biotin 2,500 mcg cap Take  by mouth.  4/28/20  Provider, Historical   QUEtiapine (SEROQUEL) 25 mg tablet TAKE 1 TABLET BY MOUTH EVERY DAY AT NIGHT 7/23/19 4/28/20  Malissa Watts MD   melatonin 5 mg cap capsule Take 2 Caps by mouth nightly. 7/23/19 4/28/20  Malissa Watts MD   multivitamin (ONE A DAY) tablet Take 1 Tab by mouth daily. 4/28/20  Provider, Historical     No Known Allergies    Social History     Tobacco Use    Smoking status: Never Smoker    Smokeless tobacco: Never Used   Substance Use Topics    Alcohol use: No     ROS as supplied by [employer]  Review of Systems   Constitutional: Negative for chills and fever. HENT: Negative for congestion. Respiratory: Negative for cough. Skin: Negative for rash. PHYSICAL EXAMINATION:    Vital Signs: (As obtained by patient/caregiver at home)  There were no vitals taken for this visit.    Sitting in wheeled walker during visit. Waves, but requires frequent re-direction. Daughter is main communicator,  also present on call and helps to provide history. Constitutional: [x] Appears well-developed and well-nourished [x] No apparent distress      Mental status: [x] Alert and awake     Eyes:   EOM    [x]  Normal      Sclera  [x]  Normal              Discharge [x]  None visible       HENT: [x] Normocephalic, atraumatic    [x] Mouth/Throat: Mucous membranes are moist    External Ears [x] Normal      Neck: [x] No visualized mass     Pulmonary/Chest: [x] Respiratory effort normal   [x] No visualized signs of difficulty breathing or respiratory distress         Musculoskeletal:   [x] sitting in wheeled walker with seat the entire visit. [x] Normal range of motion of neck        Neurological:        [x] No Facial Asymmetry (Cranial nerve 7 motor function) (limited exam due to video visit)          [x] No gaze palsy              Skin:        [x] wound on anterior shin of left leg. Skin around wound appears healthy on video, no redness. There is a intact eschar wihtout any weeping. No visible edema of leg. Psychiatric:       [x] Flat affect         [x] smiles and waves at daughters prompting. Requires reminder every 30 seconds to not touch wound on leg until daughter redresses    Other pertinent observable physical exam findings:- none        We discussed the expected course, resolution and complications of the diagnosis(es) in detail. Medication risks, benefits, costs, interactions, and alternatives were discussed as indicated. I advised her to contact the office if her condition worsens, changes or fails to improve as anticipated. She expressed understanding with the diagnosis(es) and plan.      Pursuant to the emergency declaration under the [facility], [address] [place] [address] and the [employer] Act, this Virtual  Visit was conducted, with patient's consent, to reduce the patient's risk of exposure to COVID-19 and provide continuity of care for an established patient. Services were provided through a video synchronous discussion virtually to substitute for in-person clinic visit.     Eber Beard MD

## 2020-04-29 ENCOUNTER — DOCUMENTATION ONLY (OUTPATIENT)
Dept: INTERNAL MEDICINE CLINIC | Age: 75
End: 2020-04-29

## 2020-04-29 NOTE — PROGRESS NOTES
Forms faxed to [address] [place] [address]. Confirmation received. Document placed in bin for centralized scanning.     Fax # [phone]

## 2020-04-30 ENCOUNTER — TELEPHONE (OUTPATIENT)
Dept: INTERNAL MEDICINE CLINIC | Age: 75
End: 2020-04-30

## 2020-04-30 ENCOUNTER — HOME CARE VISIT (OUTPATIENT)
Dept: HOME HEALTH SERVICES | Facility: HOME HEALTH | Age: 75
End: 2020-04-30

## 2020-04-30 ENCOUNTER — HOME CARE VISIT (OUTPATIENT)
Dept: SCHEDULING | Facility: HOME HEALTH | Age: 75
End: 2020-04-30

## 2020-04-30 DIAGNOSIS — R26.89 SHUFFLING GAIT: ICD-10-CM

## 2020-04-30 DIAGNOSIS — G31.09 FRONTOTEMPORAL DEMENTIA WITH BEHAVIORAL DISTURBANCE (HCC): ICD-10-CM

## 2020-04-30 DIAGNOSIS — G62.9 PERIPHERAL POLYNEUROPATHY: ICD-10-CM

## 2020-04-30 DIAGNOSIS — G31.09 OTHER FRONTOTEMPORAL DEMENTIA WITH BEHAVIORAL DISTURBANCE: Primary | ICD-10-CM

## 2020-04-30 DIAGNOSIS — F02.818 FRONTOTEMPORAL DEMENTIA WITH BEHAVIORAL DISTURBANCE (HCC): ICD-10-CM

## 2020-04-30 DIAGNOSIS — R29.6 RECURRENT FALLS: ICD-10-CM

## 2020-04-30 DIAGNOSIS — F02.818 OTHER FRONTOTEMPORAL DEMENTIA WITH BEHAVIORAL DISTURBANCE: Primary | ICD-10-CM

## 2020-04-30 NOTE — TELEPHONE ENCOUNTER
----- Message from Vamsi Lin sent at 4/30/2020  2:29 PM EDT -----  Regarding: Dr Cordell Moseley from [address] [facility] is calling to let the doctor know the daughter wants Physical Therapy hold off until next week, will need a new order, please call [phone]

## 2020-05-01 NOTE — TELEPHONE ENCOUNTER
Left message advising we would be happy to provide a verbal if needed, or they can fax any requests to our office.  Provided fax and call back number

## 2020-05-04 NOTE — TELEPHONE ENCOUNTER
This seems unlikely. My referral orders should be good for 6 months. Just more busy work, perhaps for auditing reasons they cancel then request new orders? Will write order to remove any false barriers. Please clarify when they would like it written so we can avoid any further repeats.      Judi Whalen MD

## 2020-05-04 NOTE — TELEPHONE ENCOUNTER
----- Message from Mariam Rea sent at 5/4/2020  8:55 AM EDT -----  Regarding: Dr. Reji Renae first and last name:  Jerica Casillas, Physical Therapist, [facility] - INPATIENT      Reason for call:  Returning Kristin's call from last Friday.   Please resubmit referral for pt's PT and OT      Callback required yes/no and why:  Yes, any questions      Best contact number(s):  [phone]      Details to clarify the request:      Mariam Rea

## 2020-05-05 ENCOUNTER — HOME CARE VISIT (OUTPATIENT)
Dept: HOME HEALTH SERVICES | Facility: HOME HEALTH | Age: 75
End: 2020-05-05

## 2020-05-05 ENCOUNTER — HOME CARE VISIT (OUTPATIENT)
Dept: SCHEDULING | Facility: HOME HEALTH | Age: 75
End: 2020-05-05
Payer: MEDICARE

## 2020-05-05 PROCEDURE — 400013 HH SOC

## 2020-05-05 PROCEDURE — G0151 HHCP-SERV OF PT,EA 15 MIN: HCPCS

## 2020-05-06 ENCOUNTER — TELEPHONE (OUTPATIENT)
Dept: INTERNAL MEDICINE CLINIC | Age: 75
End: 2020-05-06

## 2020-05-06 ENCOUNTER — HOME CARE VISIT (OUTPATIENT)
Dept: SCHEDULING | Facility: HOME HEALTH | Age: 75
End: 2020-05-06
Payer: MEDICARE

## 2020-05-06 ENCOUNTER — HOME CARE VISIT (OUTPATIENT)
Dept: HOME HEALTH SERVICES | Facility: HOME HEALTH | Age: 75
End: 2020-05-06
Payer: MEDICARE

## 2020-05-06 VITALS
DIASTOLIC BLOOD PRESSURE: 60 MMHG | SYSTOLIC BLOOD PRESSURE: 118 MMHG | OXYGEN SATURATION: 95 % | TEMPERATURE: 97 F | RESPIRATION RATE: 16 BRPM | HEART RATE: 70 BPM

## 2020-05-06 VITALS
TEMPERATURE: 99 F | HEART RATE: 87 BPM | SYSTOLIC BLOOD PRESSURE: 120 MMHG | DIASTOLIC BLOOD PRESSURE: 80 MMHG | RESPIRATION RATE: 16 BRPM | OXYGEN SATURATION: 96 %

## 2020-05-06 PROCEDURE — G0152 HHCP-SERV OF OT,EA 15 MIN: HCPCS

## 2020-05-07 DIAGNOSIS — G31.09 FRONTOTEMPORAL DEMENTIA WITH BEHAVIORAL DISTURBANCE (HCC): Primary | ICD-10-CM

## 2020-05-07 DIAGNOSIS — F02.818 FRONTOTEMPORAL DEMENTIA WITH BEHAVIORAL DISTURBANCE (HCC): Primary | ICD-10-CM

## 2020-05-07 DIAGNOSIS — Z74.09 IMPAIRED MOBILITY AND ACTIVITIES OF DAILY LIVING: ICD-10-CM

## 2020-05-07 DIAGNOSIS — Z78.9 IMPAIRED MOBILITY AND ACTIVITIES OF DAILY LIVING: ICD-10-CM

## 2020-05-07 DIAGNOSIS — R29.6 RECURRENT FALLS: ICD-10-CM

## 2020-05-07 NOTE — PROGRESS NOTES
Please fax the printed amb supply order as noted below. Thanks  Gerhard Acosta MD      _________________________________  Astrid Arnold See     Will you please fax [facility] a face sheet for Ms. Mercedes Vasquez Ward and a script for a 16x16 WC with cushion, shower chair and 3:1 commode? Please call me with questions. [phone].      thank you,   Luan Gonzalez, OT

## 2020-05-13 ENCOUNTER — DOCUMENTATION ONLY (OUTPATIENT)
Dept: INTERNAL MEDICINE CLINIC | Age: 75
End: 2020-05-13

## 2020-05-13 ENCOUNTER — HOME CARE VISIT (OUTPATIENT)
Dept: HOME HEALTH SERVICES | Facility: HOME HEALTH | Age: 75
End: 2020-05-13
Payer: MEDICARE

## 2020-05-13 DIAGNOSIS — R26.89 SHUFFLING GAIT: ICD-10-CM

## 2020-05-13 DIAGNOSIS — G31.09 FRONTOTEMPORAL DEMENTIA WITH BEHAVIORAL DISTURBANCE (HCC): Primary | ICD-10-CM

## 2020-05-13 DIAGNOSIS — R29.6 RECURRENT FALLS: ICD-10-CM

## 2020-05-13 DIAGNOSIS — F02.818 FRONTOTEMPORAL DEMENTIA WITH BEHAVIORAL DISTURBANCE (HCC): Primary | ICD-10-CM

## 2020-05-13 NOTE — PROGRESS NOTES
amb supply order printed for wheelchair, shower chair etc.   Please fax to [facility].      Aggie Andrade MD

## 2020-05-15 ENCOUNTER — HOME CARE VISIT (OUTPATIENT)
Dept: SCHEDULING | Facility: HOME HEALTH | Age: 75
End: 2020-05-15
Payer: MEDICARE

## 2020-05-15 ENCOUNTER — HOME CARE VISIT (OUTPATIENT)
Dept: HOME HEALTH SERVICES | Facility: HOME HEALTH | Age: 75
End: 2020-05-15
Payer: MEDICARE

## 2020-05-15 PROCEDURE — G0152 HHCP-SERV OF OT,EA 15 MIN: HCPCS

## 2020-05-16 VITALS
HEART RATE: 84 BPM | OXYGEN SATURATION: 98 % | DIASTOLIC BLOOD PRESSURE: 78 MMHG | TEMPERATURE: 97.8 F | SYSTOLIC BLOOD PRESSURE: 122 MMHG | RESPIRATION RATE: 18 BRPM

## 2020-05-20 ENCOUNTER — HOME CARE VISIT (OUTPATIENT)
Dept: HOME HEALTH SERVICES | Facility: HOME HEALTH | Age: 75
End: 2020-05-20
Payer: MEDICARE

## 2020-05-22 ENCOUNTER — HOME CARE VISIT (OUTPATIENT)
Dept: HOME HEALTH SERVICES | Facility: HOME HEALTH | Age: 75
End: 2020-05-22
Payer: MEDICARE

## 2020-05-22 ENCOUNTER — HOME CARE VISIT (OUTPATIENT)
Dept: SCHEDULING | Facility: HOME HEALTH | Age: 75
End: 2020-05-22
Payer: MEDICARE

## 2020-05-22 PROCEDURE — G0152 HHCP-SERV OF OT,EA 15 MIN: HCPCS

## 2020-05-23 VITALS
SYSTOLIC BLOOD PRESSURE: 142 MMHG | OXYGEN SATURATION: 96 % | DIASTOLIC BLOOD PRESSURE: 80 MMHG | HEART RATE: 78 BPM | TEMPERATURE: 96.4 F

## 2020-05-23 DIAGNOSIS — F03.90 DEMENTIA WITHOUT BEHAVIORAL DISTURBANCE, UNSPECIFIED DEMENTIA TYPE: ICD-10-CM

## 2020-05-26 ENCOUNTER — OFFICE VISIT (OUTPATIENT)
Dept: NEUROLOGY | Age: 75
End: 2020-05-26

## 2020-05-26 DIAGNOSIS — Z78.9 IMPAIRED MOBILITY AND ADLS: ICD-10-CM

## 2020-05-26 DIAGNOSIS — G31.09 OTHER FRONTOTEMPORAL DEMENTIA WITH BEHAVIORAL DISTURBANCE: ICD-10-CM

## 2020-05-26 DIAGNOSIS — R48.2 GAIT APRAXIA: ICD-10-CM

## 2020-05-26 DIAGNOSIS — Z74.09 IMPAIRED MOBILITY AND ADLS: ICD-10-CM

## 2020-05-26 DIAGNOSIS — F02.80 FRONTO-TEMPORAL DEMENTIA (HCC): Primary | ICD-10-CM

## 2020-05-26 DIAGNOSIS — G31.09 FRONTO-TEMPORAL DEMENTIA (HCC): Primary | ICD-10-CM

## 2020-05-26 DIAGNOSIS — R48.2 MOTOR APRAXIA: ICD-10-CM

## 2020-05-26 DIAGNOSIS — G47.33 OBSTRUCTIVE SLEEP APNEA: ICD-10-CM

## 2020-05-26 DIAGNOSIS — F02.818 OTHER FRONTOTEMPORAL DEMENTIA WITH BEHAVIORAL DISTURBANCE: ICD-10-CM

## 2020-05-26 RX ORDER — DONEPEZIL HYDROCHLORIDE 5 MG/1
TABLET, FILM COATED ORAL
Qty: 90 TAB | Refills: 1 | Status: SHIPPED | OUTPATIENT
Start: 2020-05-26 | End: 2020-05-26 | Stop reason: DRUGHIGH

## 2020-05-26 RX ORDER — DONEPEZIL HYDROCHLORIDE 10 MG/1
10 TABLET, FILM COATED ORAL
Qty: 30 TAB | Refills: 0 | Status: SHIPPED | OUTPATIENT
Start: 2020-05-26

## 2020-05-27 ENCOUNTER — HOME CARE VISIT (OUTPATIENT)
Dept: SCHEDULING | Facility: HOME HEALTH | Age: 75
End: 2020-05-27
Payer: MEDICARE

## 2020-05-27 ENCOUNTER — VIRTUAL VISIT (OUTPATIENT)
Dept: BEHAVIORAL/MENTAL HEALTH CLINIC | Age: 75
End: 2020-05-27

## 2020-05-27 VITALS
HEART RATE: 102 BPM | DIASTOLIC BLOOD PRESSURE: 80 MMHG | TEMPERATURE: 99.1 F | OXYGEN SATURATION: 99 % | SYSTOLIC BLOOD PRESSURE: 144 MMHG

## 2020-05-27 DIAGNOSIS — F02.818 FRONTOTEMPORAL DEMENTIA WITH BEHAVIORAL DISTURBANCE (HCC): Primary | ICD-10-CM

## 2020-05-27 DIAGNOSIS — G47.01 INSOMNIA DUE TO MEDICAL CONDITION: ICD-10-CM

## 2020-05-27 DIAGNOSIS — G31.09 FRONTOTEMPORAL DEMENTIA WITH BEHAVIORAL DISTURBANCE (HCC): Primary | ICD-10-CM

## 2020-05-27 PROCEDURE — G0152 HHCP-SERV OF OT,EA 15 MIN: HCPCS

## 2020-05-27 NOTE — PROGRESS NOTES
Eva Hopper is a 67 y.o. female evaluated via audio only technology on 5/27/2020. Consent: She and/or her health care decision maker is aware that she may receive a bill for this audio only encounter, depending on her insurance coverage, and has provided verbal consent to proceed: Yes    I communicated with the patient and/or health care decision maker about the nature and details of the following:  Assessment & Plan:   spoke with pt's daughter (caregiver) to review update on pt's care. Pt is no longer taking seroquel as it was possibly impacting her motor function. Overall mood is stable and so no psychotropics required at this time. Agreed to place pt's status on PRN only as neurology is managing her medications. Reviewed current status and pt reportedly has limited interaction. Pt's family remains active in her care. Pt's caregiver (daughter) provided verbal consent to allow Yoanna Quijano RN, NP student, to participate in session today. 12has limited   Subjective:   Eva Hopper is a 67 y.o. female who was seen for Medication Management      Prior to Admission medications    Medication Sig Start Date End Date Taking? Authorizing Provider   donepeziL (ARICEPT) 10 mg tablet Take 1 Tab by mouth nightly. 5/26/20   Ramón Armando NP   glucosamine-chondroitin (Cosamin DS) 500-400 mg tablet Take 1 Tab by mouth three (3) times daily. Provider, Historical   melatonin 1 mg tablet Take 1 mg by mouth nightly. Provider, Historical   Myrbetriq 25 mg ER tablet Take 25 mg by mouth daily. 4/27/20   Provider, Historical   cephALEXin (KEFLEX) 250 mg capsule Take 1 Cap by mouth nightly. Start after completing other antibiotic. 4/28/20   Clyde Day MD   omega 3-dha-epa-fish oil (FISH OIL) 100-160-1,000 mg cap Fish Oil    Provider, Historical   cranberry 500 mg capsule Take 500 mg by mouth daily. Provider, Historical   calcium carbonate (CALCIUM 300 PO) Take 1 Tab by mouth daily.     Provider, Historical   cyanocobalamin (VITAMIN B-12) 100 mcg tablet Take 100 mcg by mouth daily. Provider, Historical     No Known Allergies     Encounter Diagnoses     ICD-10-CM ICD-9-CM   1. Frontotemporal dementia with behavioral disturbance (HCC) G31.09 331.19    F02.81 294.11   2. Insomnia due to medical condition G47.01 327.01         ROS: progressive dementia    I affirm this is a Patient-Initiated Episode with a Patient who has not had a related appointment within my department in the past 7 days or scheduled within the next 24 hours.     Total Time: minutes: 5-10 minutes    Note: not billable if this call serves to triage the patient into an appointment for the relevant concern      Becca Quintero NP

## 2020-06-24 NOTE — TELEPHONE ENCOUNTER
Juan Jose Lam request to speak with a nurse to check the status on equipment for a wheel chair.  #  [id number] Yes - the patient is able to be screened

## 2020-07-29 ENCOUNTER — TELEPHONE (OUTPATIENT)
Dept: SLEEP MEDICINE | Age: 75
End: 2020-07-29

## 2020-07-29 NOTE — TELEPHONE ENCOUNTER
Called patient to reschedule PSG/MSLT Sleep Study @ [facility] but patient will call back when she's comfortable to do so.

## 2020-09-04 NOTE — H&P
H and P is dictated.  ID [id number]    Daughter name:  Aisha Bird  Composite Graft Text: The defect edges were debeveled with a #15 scalpel blade.  Given the location of the defect, shape of the defect, the proximity to free margins and the fact the defect was full thickness a composite graft was deemed most appropriate.  The defect was outline and then transferred to the donor site.  A full thickness graft was then excised from the donor site. The graft was then placed in the primary defect, oriented appropriately and then sutured into place.  The secondary defect was then repaired using a primary closure.

## 2020-12-22 ENCOUNTER — VIRTUAL VISIT (OUTPATIENT)
Dept: INTERNAL MEDICINE CLINIC | Age: 75
End: 2020-12-22
Payer: MEDICARE

## 2020-12-22 DIAGNOSIS — Z78.9 IMPAIRED MOBILITY AND ADLS: ICD-10-CM

## 2020-12-22 DIAGNOSIS — Z74.3 REQUIRES CONTINUOUS SUPERVISION FOR ACTIVITIES OF DAILY LIVING (ADL): ICD-10-CM

## 2020-12-22 DIAGNOSIS — N39.0 RECURRENT UTI (URINARY TRACT INFECTION): ICD-10-CM

## 2020-12-22 DIAGNOSIS — G31.09 OTHER FRONTOTEMPORAL DEMENTIA WITH BEHAVIORAL DISTURBANCE: Primary | ICD-10-CM

## 2020-12-22 DIAGNOSIS — F02.818 OTHER FRONTOTEMPORAL DEMENTIA WITH BEHAVIORAL DISTURBANCE: Primary | ICD-10-CM

## 2020-12-22 DIAGNOSIS — Z74.09 IMPAIRED MOBILITY AND ADLS: ICD-10-CM

## 2020-12-22 PROCEDURE — G8432 DEP SCR NOT DOC, RNG: HCPCS | Performed by: INTERNAL MEDICINE

## 2020-12-22 PROCEDURE — 1101F PT FALLS ASSESS-DOCD LE1/YR: CPT | Performed by: INTERNAL MEDICINE

## 2020-12-22 PROCEDURE — G8399 PT W/DXA RESULTS DOCUMENT: HCPCS | Performed by: INTERNAL MEDICINE

## 2020-12-22 PROCEDURE — G8428 CUR MEDS NOT DOCUMENT: HCPCS | Performed by: INTERNAL MEDICINE

## 2020-12-22 PROCEDURE — 99214 OFFICE O/P EST MOD 30 MIN: CPT | Performed by: INTERNAL MEDICINE

## 2020-12-22 PROCEDURE — 1090F PRES/ABSN URINE INCON ASSESS: CPT | Performed by: INTERNAL MEDICINE

## 2020-12-22 RX ORDER — QUETIAPINE FUMARATE 50 MG/1
50 TABLET, FILM COATED ORAL
COMMUNITY

## 2020-12-22 NOTE — PROGRESS NOTES
Carrillo Hopper is a 67 y.o. female who was seen by synchronous (real-time) audio-video technology on 12/22/2020. Consent: Carrillo Hopper, who was seen by synchronous (real-time) audio-video technology, and/or her healthcare decision maker, is aware that this patient-initiated, Telehealth encounter on 12/22/2020 is a billable service, with coverage as determined by her insurance carrier. She is aware that she may receive a bill and has provided verbal consent to proceed: Yes. Assessment & Plan:   Diagnoses and all orders for this visit:    1. Other frontotemporal dementia with behavioral disturbance ([address]  -     [facility]    2. Recurrent UTI (urinary tract infection)    gradual progression of dementia with now decline in motor skills. - by report not affected by UTI treatments done over past several months with urology. - with ongoing good appetite and intake of fluids not clear that hospice is yet appropriate. - however does need 24 hour supervision.    - currently daughter is funded at 5 hours per day, would be appropriate to increase to 12 hours per day. - will send letter to requested     Will also request assistance from home health to reassess home safety and transfer needs. (Please also see details in patient instructions)  Subjective:   Carrillo Hopper is a 67 y.o. female who was seen for No chief complaint on file. Reports mother's condition has deteriorated. She is not moving her body much. Mostly in the wheelchair and sleeping a lot. Reports that this has bee a gradual decline throughout the \"pandemic times\". Patient lying in bed. Using a scoot to move her. Requires 2 people to turn or move her    Have installed handles in home. Has been having slow decline in her abilities over several months. Was treated for urinary tract infection, went to urology. Will eat, shows appetite and will eat, requires soft food and liquid things. Will drink fluids and     Meds:    - sometimes xiomara give quetiapine if aggiated. [facility] - urologist.     Donepezil - neurology - not taking most days, makes hands shaky    Currently payment of 5 hours per day. Will change to 10-12 hours per ay. Medicaid facilitator fax: [phone]. Milind Fillers. Would liek home health recommendations for improving transfers and possible DME to help with this. Prior to Admission medications    Medication Sig Start Date End Date Taking? Authorizing Provider   donepeziL (ARICEPT) 10 mg tablet Take 1 Tab by mouth nightly. 5/26/20   Sylvia Viera NP   glucosamine-chondroitin (Cosamin DS) 500-400 mg tablet Take 1 Tab by mouth three (3) times daily. Provider, Historical   melatonin 1 mg tablet Take 1 mg by mouth nightly. Provider, Historical   Myrbetriq 25 mg ER tablet Take 25 mg by mouth daily. 4/27/20   Provider, Historical   cephALEXin (KEFLEX) 250 mg capsule Take 1 Cap by mouth nightly. Start after completing other antibiotic. 4/28/20   Kiley Mora MD   omega 5-xiq-rmg-fish oil (FISH OIL) 100-160-1,000 mg cap Fish Oil    Provider, Historical   cranberry 500 mg capsule Take 500 mg by mouth daily. Provider, Historical   calcium carbonate (CALCIUM 300 PO) Take 1 Tab by mouth daily. Provider, Historical   cyanocobalamin (VITAMIN B-12) 100 mcg tablet Take 100 mcg by mouth daily. Provider, Historical     No Known Allergies    Social History     Tobacco Use    Smoking status: Never Smoker    Smokeless tobacco: Never Used   Substance Use Topics    Alcohol use: No     Review of Systems   Constitutional: Positive for malaise/fatigue. Negative for fever. Respiratory: Negative for cough. Gastrointestinal: Negative for diarrhea and vomiting. Objective:   Vital Signs: (As obtained by patient/caregiver at home)  There were no vitals taken for this visit.      PHYSICAL EXAMINATION:    Limited exam. Patient is lying in bed and per daughter has been sleeping. Wakes and makes eye contact with video, no recognition. Daughter reports minimal speech, but ongoing good oral intake without new concern for choking, provides soft foods due to difficulty chewing. We discussed the expected course, resolution and complications of the diagnosis(es) in detail. Medication risks, benefits, costs, interactions, and alternatives were discussed as indicated. I advised her to contact the office if her condition worsens, changes or fails to improve as anticipated. She expressed understanding with the diagnosis(es) and plan. Dion Hopper is a 67 y.o. female who was evaluated by a video visit encounter for concerns as above. Patient identification was verified prior to start of the visit. A caregiver was present when appropriate. Due to this being a TeleHealth encounter (During [facility]-34 public health emergency), evaluation of the following organ systems was limited: Vitals/Constitutional/EENT/Resp/CV/GI//MS/Neuro/Skin/Heme-Lymph-Imm. Pursuant to the emergency declaration under the [facility], 1135 waiver authority and the [employer] and [employer] Act, this Virtual  Visit was conducted, with patient's (and/or legal guardian's) consent, to reduce the patient's risk of exposure to COVID-19 and provide necessary medical care. Services were provided through a video synchronous discussion virtually to substitute for in-person clinic visit. Patient was located at their home. Provider was in office.        Carlyn Blair MD

## 2020-12-22 NOTE — LETTER
2020 8:03 PM 
 
 
Regarding: Ms. Ricardo Hopper 
[address] [facility] [address] : 1945 To whom it may concern. Ms. Ricardo Hopper is a 67year old woman with a severe and progressive form of dementia. At this time she requires 24 hour supervision. She is full assist for all transfers and other activities of daily living including toileting, dressing, bathing, and eating. Her daughter is her primary carer. Her  also helps, but he is elderly as well. Together they are very dedicated and have shown excellent care of Mrs. Gabriela Hopper. Please increase the care hours per day for Mrs. Roverto Hopper from 5 to 12 hours. This is in response to her steady and gradual decline and now full assist with all ADLs. Thanks for your support and understanding. Patient Active Problem List  
 Diagnosis Date Noted  Impaired mobility and ADLs 2020  Requires continuous supervision for activities of daily living (ADL) 2020  
 IGT (impaired glucose tolerance) 2019  Peripheral polyneuropathy 2019  Recurrent falls 2019  Frontotemporal dementia with behavioral disturbance ([facility] 2019  Recurrent UTI (urinary tract infection) 2019  Back pain 2019  Dementia ([facility] 2017  Cataract 2017  Osteopenia Sincerely, 
 
 
Naif Marley MD

## 2020-12-23 ENCOUNTER — DOCUMENTATION ONLY (OUTPATIENT)
Dept: INTERNAL MEDICINE CLINIC | Age: 75
End: 2020-12-23

## 2020-12-23 PROBLEM — Z74.3 REQUIRES CONTINUOUS SUPERVISION FOR ACTIVITIES OF DAILY LIVING (ADL): Status: ACTIVE | Noted: 2020-12-23

## 2020-12-23 PROBLEM — Z74.09 IMPAIRED MOBILITY AND ADLS: Status: ACTIVE | Noted: 2020-12-23

## 2020-12-23 PROBLEM — Z78.9 IMPAIRED MOBILITY AND ADLS: Status: ACTIVE | Noted: 2020-12-23

## 2020-12-23 RX ORDER — CEPHALEXIN 250 MG/1
250 CAPSULE ORAL
Qty: 30 CAP | Refills: 0 | COMMUNITY
Start: 2020-12-22

## 2020-12-24 NOTE — PROGRESS NOTES
Faxed letter for request of increased carer hours and received confirmation  Fax # [phone].  Medicaid advocate    Tony Pierson MD

## 2020-12-28 ENCOUNTER — HOME HEALTH ADMISSION (OUTPATIENT)
Dept: HOME HEALTH SERVICES | Facility: HOME HEALTH | Age: 75
End: 2020-12-28
Payer: MEDICARE

## 2020-12-29 ENCOUNTER — HOME CARE VISIT (OUTPATIENT)
Dept: SCHEDULING | Facility: HOME HEALTH | Age: 75
End: 2020-12-29
Payer: MEDICARE

## 2020-12-29 VITALS
SYSTOLIC BLOOD PRESSURE: 132 MMHG | TEMPERATURE: 97.7 F | OXYGEN SATURATION: 97 % | HEART RATE: 80 BPM | RESPIRATION RATE: 22 BRPM | DIASTOLIC BLOOD PRESSURE: 85 MMHG

## 2020-12-29 VITALS
OXYGEN SATURATION: 96 % | DIASTOLIC BLOOD PRESSURE: 85 MMHG | SYSTOLIC BLOOD PRESSURE: 132 MMHG | RESPIRATION RATE: 22 BRPM | TEMPERATURE: 97.7 F | HEART RATE: 80 BPM

## 2020-12-29 PROCEDURE — 400013 HH SOC

## 2020-12-29 PROCEDURE — G0152 HHCP-SERV OF OT,EA 15 MIN: HCPCS

## 2020-12-29 PROCEDURE — G0151 HHCP-SERV OF PT,EA 15 MIN: HCPCS

## 2020-12-30 NOTE — PROGRESS NOTES
Patient will be seen for Physical Therapy 1wk1, 2wk6 for transfer training, caregiver education and to assist with improving safe mobility in the home. OT for 1wk1, 2wk2 2wk1  Please approve POC. Thank you.

## 2021-01-05 ENCOUNTER — HOME CARE VISIT (OUTPATIENT)
Dept: SCHEDULING | Facility: HOME HEALTH | Age: 76
End: 2021-01-05
Payer: MEDICARE

## 2021-01-05 PROCEDURE — G0157 HHC PT ASSISTANT EA 15: HCPCS

## 2021-01-06 ENCOUNTER — HOME CARE VISIT (OUTPATIENT)
Dept: SCHEDULING | Facility: HOME HEALTH | Age: 76
End: 2021-01-06
Payer: MEDICARE

## 2021-01-06 PROCEDURE — G0152 HHCP-SERV OF OT,EA 15 MIN: HCPCS

## 2021-01-07 VITALS
TEMPERATURE: 98.2 F | RESPIRATION RATE: 16 BRPM | OXYGEN SATURATION: 98 % | HEART RATE: 78 BPM | DIASTOLIC BLOOD PRESSURE: 84 MMHG | SYSTOLIC BLOOD PRESSURE: 144 MMHG

## 2021-01-08 ENCOUNTER — HOME CARE VISIT (OUTPATIENT)
Dept: SCHEDULING | Facility: HOME HEALTH | Age: 76
End: 2021-01-08
Payer: MEDICARE

## 2021-01-08 VITALS
DIASTOLIC BLOOD PRESSURE: 80 MMHG | TEMPERATURE: 97.7 F | HEART RATE: 88 BPM | SYSTOLIC BLOOD PRESSURE: 140 MMHG | RESPIRATION RATE: 16 BRPM | OXYGEN SATURATION: 90 %

## 2021-01-08 PROCEDURE — G0151 HHCP-SERV OF PT,EA 15 MIN: HCPCS

## 2021-01-08 PROCEDURE — G0152 HHCP-SERV OF OT,EA 15 MIN: HCPCS

## 2021-01-12 ENCOUNTER — HOME CARE VISIT (OUTPATIENT)
Dept: SCHEDULING | Facility: HOME HEALTH | Age: 76
End: 2021-01-12
Payer: MEDICARE

## 2021-01-12 VITALS
DIASTOLIC BLOOD PRESSURE: 80 MMHG | HEART RATE: 80 BPM | RESPIRATION RATE: 16 BRPM | OXYGEN SATURATION: 94 % | SYSTOLIC BLOOD PRESSURE: 140 MMHG | TEMPERATURE: 97 F

## 2021-01-12 PROCEDURE — G0152 HHCP-SERV OF OT,EA 15 MIN: HCPCS

## 2021-01-12 PROCEDURE — G0151 HHCP-SERV OF PT,EA 15 MIN: HCPCS

## 2021-01-14 ENCOUNTER — HOME CARE VISIT (OUTPATIENT)
Dept: HOME HEALTH SERVICES | Facility: HOME HEALTH | Age: 76
End: 2021-01-14
Payer: MEDICARE

## 2021-01-15 ENCOUNTER — HOME CARE VISIT (OUTPATIENT)
Dept: SCHEDULING | Facility: HOME HEALTH | Age: 76
End: 2021-01-15
Payer: MEDICARE

## 2021-01-15 PROCEDURE — G0152 HHCP-SERV OF OT,EA 15 MIN: HCPCS

## 2021-01-19 ENCOUNTER — HOME CARE VISIT (OUTPATIENT)
Dept: HOME HEALTH SERVICES | Facility: HOME HEALTH | Age: 76
End: 2021-01-19
Payer: MEDICARE

## 2021-01-19 ENCOUNTER — HOME CARE VISIT (OUTPATIENT)
Dept: SCHEDULING | Facility: HOME HEALTH | Age: 76
End: 2021-01-19
Payer: MEDICARE

## 2021-01-19 VITALS
TEMPERATURE: 97 F | HEART RATE: 92 BPM | OXYGEN SATURATION: 95 % | RESPIRATION RATE: 16 BRPM | SYSTOLIC BLOOD PRESSURE: 138 MMHG | DIASTOLIC BLOOD PRESSURE: 75 MMHG

## 2021-01-19 PROCEDURE — G0152 HHCP-SERV OF OT,EA 15 MIN: HCPCS

## 2021-01-19 PROCEDURE — G0151 HHCP-SERV OF PT,EA 15 MIN: HCPCS

## 2021-02-16 ENCOUNTER — TELEPHONE (OUTPATIENT)
Dept: INTERNAL MEDICINE CLINIC | Age: 76
End: 2021-02-16

## 2021-02-16 NOTE — TELEPHONE ENCOUNTER
Writer received from [facility]) Larry Salazar DME Form from [address] [place] [address] on 2/16/21 form has been scanned into CC and placed into 's bin to be completed. Writer left message that we received the form.

## 2021-02-16 NOTE — TELEPHONE ENCOUNTER
----- Message from Tomasa Everett sent at 2/16/2021 12:56 PM EST -----  Regarding: DR. Ricki Sidhu  General Message/Vendor Calls    Caller's first and last name: Zhane Bailey (daughter)      Reason for call: Requesting more supplies for pt, stated home care delivery has sent a request and waiting for approval.       Asael Burroughs required yes/no and why: If need      Best contact number(s): [phone]      Details to clarify the request:      Tomasa Everett

## 2021-02-17 NOTE — TELEPHONE ENCOUNTER
Paperwork faxed to Home care delivered. [phone]. Confirmation received. Document placed in bin for centralized scanning.

## 2021-03-04 NOTE — TELEPHONE ENCOUNTER
Dr. Reginald Meier  Received: Yesterday       Fidelina Jimenez  P Cp Front Office Pool                     Pt is requesting a return call, regarding  of form and refill rx for anxiety.  Best contact number is [phone]
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
## 2025-05-06 NOTE — PROGRESS NOTES
HPI   Tatianna Allen He is a 66 y.o. female, she presents today for:    Patient presents with daughter. History of recurrent urinary infections. Last around [place] time. Currently not having any problems. Treated with antibiotic. No preventative medication. Symtpoms is of increased leakage. No normally with pain. Currently in physical therapy. Would like to continue. For back and walk (has trouble with stumbling). Has been having this problem for a while, no specific known reason. Review of chart showed barium swallow, penetration and aspiration only when drinking through straw. Otherwise normal with all consistencies. Osteopenic on dexa. (better than prior). Probably has cataracts. PMH/PSH: reviewed and updated  Sochx:  reports that she has never smoked. She does not have any smokeless tobacco history on file. She reports that she does not drink alcohol or use illicit drugs. Famhx: reviewed and updated     All: No Known Allergies  Med:   Current Outpatient Prescriptions   Medication Sig    fish oil-omega-3 fatty acids 340-1,000 mg capsule Take 1 Cap by mouth daily.  VITAMIN E, DL,TOCOPHERYL ACET, (VITAMIN E ACETATE) 400 unit cap capsule Take  by mouth daily.  calcium 500 mg Tab Take  by mouth. No current facility-administered medications for this visit. Review of Systems   Constitutional: Negative for chills, fever and weight loss. HENT: Negative for congestion. Respiratory: Negative for cough, shortness of breath and wheezing. Cardiovascular: Negative for chest pain and leg swelling. Gastrointestinal: Negative for abdominal pain, diarrhea, nausea and vomiting. Genitourinary: Negative for dysuria. Skin: Negative for rash. Neurological: Negative for dizziness and headaches. PE:  Blood pressure 141/69, pulse 80, temperature 97.3 °F (36.3 °C), temperature source Oral, resp. rate 16, height 5' (1.524 m), weight 132 lb 6.4 oz (60.1 kg).   Body mass index Patient reports pain in his back right molar  No abnormalities on visual inspection, multiple missing teeth  Will try Magic mouthwash   is 25.86 kg/(m^2). Physical Exam   Constitutional: She is oriented to person, place, and time. She appears well-developed and well-nourished. No distress. HENT:   Head: Normocephalic. Mouth/Throat: Oropharynx is clear and moist.   Eyes: Conjunctivae and EOM are normal.   Neck: Neck supple. Cardiovascular: Normal rate, regular rhythm and normal heart sounds. Pulmonary/Chest: Effort normal and breath sounds normal.   Neurological: She is alert and oriented to person, place, and time. Holds balance with eyes closed but leans forward, no retropulsion. Limited english, difficult to assess memory   Skin: Skin is warm and dry. Nursing note and vitals reviewed. Labs:   No results found for any visits on 02/09/17. A/P:  66 y.o. female    ICD-10-CM ICD-9-CM    1. Balance problem R26.89 781.99 REFERRAL TO PHYSICAL THERAPY   2. Memory change R41.3 780.93    3. Osteopenia determined by x-ray M85.80 733.90    4. Cataract H26.9 366.9 REFERRAL TO OPHTHALMOLOGY     Reviewed medical history from chart. Requesting further history from prior doctor GOMEZ Huff. Wrote orders to continue PT, and follow-up with ophthalmologist.     Follow-up Disposition:  Return in about 2 months (around 4/9/2017) for medicare wellness. No future appointments.